# Patient Record
Sex: MALE | Race: WHITE | NOT HISPANIC OR LATINO | Employment: OTHER | URBAN - METROPOLITAN AREA
[De-identification: names, ages, dates, MRNs, and addresses within clinical notes are randomized per-mention and may not be internally consistent; named-entity substitution may affect disease eponyms.]

---

## 2017-01-24 ENCOUNTER — OFFICE VISIT - HEALTHEAST (OUTPATIENT)
Dept: INTERNAL MEDICINE | Facility: CLINIC | Age: 57
End: 2017-01-24

## 2017-01-24 DIAGNOSIS — N18.30 CHRONIC KIDNEY DISEASE, STAGE III (MODERATE) (H): ICD-10-CM

## 2017-01-24 DIAGNOSIS — I10 BENIGN SYSTOLIC HYPERTENSION: ICD-10-CM

## 2017-01-24 DIAGNOSIS — G40.909 EPILEPSY (H): ICD-10-CM

## 2017-01-24 DIAGNOSIS — E78.5 HYPERLIPEMIA: ICD-10-CM

## 2017-01-24 ASSESSMENT — MIFFLIN-ST. JEOR: SCORE: 1786.93

## 2017-04-07 ENCOUNTER — COMMUNICATION - HEALTHEAST (OUTPATIENT)
Dept: INTERNAL MEDICINE | Facility: CLINIC | Age: 57
End: 2017-04-07

## 2017-04-07 DIAGNOSIS — E78.5 HYPERLIPIDEMIA: ICD-10-CM

## 2017-04-07 DIAGNOSIS — I10 HTN (HYPERTENSION): ICD-10-CM

## 2017-07-24 ENCOUNTER — COMMUNICATION - HEALTHEAST (OUTPATIENT)
Dept: INTERNAL MEDICINE | Facility: CLINIC | Age: 57
End: 2017-07-24

## 2017-07-24 ENCOUNTER — OFFICE VISIT - HEALTHEAST (OUTPATIENT)
Dept: INTERNAL MEDICINE | Facility: CLINIC | Age: 57
End: 2017-07-24

## 2017-07-24 DIAGNOSIS — E78.5 HYPERLIPEMIA: ICD-10-CM

## 2017-07-24 DIAGNOSIS — I10 BENIGN ESSENTIAL HTN: ICD-10-CM

## 2017-07-24 DIAGNOSIS — G40.909 EPILEPSY (H): ICD-10-CM

## 2017-07-24 DIAGNOSIS — N18.30 CHRONIC KIDNEY DISEASE, STAGE III (MODERATE) (H): ICD-10-CM

## 2017-07-24 LAB
CHOLEST SERPL-MCNC: 210 MG/DL
FASTING STATUS PATIENT QL REPORTED: YES
HDLC SERPL-MCNC: 34 MG/DL
LDLC SERPL CALC-MCNC: 152 MG/DL
TRIGL SERPL-MCNC: 120 MG/DL

## 2017-08-15 ENCOUNTER — COMMUNICATION - HEALTHEAST (OUTPATIENT)
Dept: INTERNAL MEDICINE | Facility: CLINIC | Age: 57
End: 2017-08-15

## 2017-08-15 DIAGNOSIS — I10 HTN (HYPERTENSION): ICD-10-CM

## 2017-08-15 DIAGNOSIS — E78.5 HYPERLIPIDEMIA: ICD-10-CM

## 2017-10-09 ENCOUNTER — RECORDS - HEALTHEAST (OUTPATIENT)
Dept: ADMINISTRATIVE | Facility: OTHER | Age: 57
End: 2017-10-09

## 2017-10-23 ENCOUNTER — COMMUNICATION - HEALTHEAST (OUTPATIENT)
Dept: INTERNAL MEDICINE | Facility: CLINIC | Age: 57
End: 2017-10-23

## 2017-10-23 DIAGNOSIS — I10 BENIGN ESSENTIAL HTN: ICD-10-CM

## 2018-01-04 ENCOUNTER — COMMUNICATION - HEALTHEAST (OUTPATIENT)
Dept: INTERNAL MEDICINE | Facility: CLINIC | Age: 58
End: 2018-01-04

## 2018-01-04 ENCOUNTER — OFFICE VISIT - HEALTHEAST (OUTPATIENT)
Dept: INTERNAL MEDICINE | Facility: CLINIC | Age: 58
End: 2018-01-04

## 2018-01-04 DIAGNOSIS — I10 BENIGN ESSENTIAL HTN: ICD-10-CM

## 2018-01-04 DIAGNOSIS — Z12.5 SCREENING PSA (PROSTATE SPECIFIC ANTIGEN): ICD-10-CM

## 2018-01-04 DIAGNOSIS — G40.909 EPILEPSY (H): ICD-10-CM

## 2018-01-04 LAB — PSA SERPL-MCNC: 0.8 NG/ML (ref 0–3.5)

## 2018-06-19 ENCOUNTER — OFFICE VISIT - HEALTHEAST (OUTPATIENT)
Dept: INTERNAL MEDICINE | Facility: CLINIC | Age: 58
End: 2018-06-19

## 2018-06-19 ENCOUNTER — AMBULATORY - HEALTHEAST (OUTPATIENT)
Dept: INTERNAL MEDICINE | Facility: CLINIC | Age: 58
End: 2018-06-19

## 2018-06-19 DIAGNOSIS — M79.662 PAIN OF LEFT LOWER LEG: ICD-10-CM

## 2018-06-19 DIAGNOSIS — M71.22 POPLITEAL CYST, LEFT: ICD-10-CM

## 2018-06-19 ASSESSMENT — MIFFLIN-ST. JEOR: SCORE: 1817.77

## 2018-06-21 ENCOUNTER — COMMUNICATION - HEALTHEAST (OUTPATIENT)
Dept: INTERNAL MEDICINE | Facility: CLINIC | Age: 58
End: 2018-06-21

## 2018-06-29 ENCOUNTER — RECORDS - HEALTHEAST (OUTPATIENT)
Dept: ADMINISTRATIVE | Facility: OTHER | Age: 58
End: 2018-06-29

## 2018-07-06 ENCOUNTER — COMMUNICATION - HEALTHEAST (OUTPATIENT)
Dept: INTERNAL MEDICINE | Facility: CLINIC | Age: 58
End: 2018-07-06

## 2018-07-06 DIAGNOSIS — E78.5 HYPERLIPIDEMIA: ICD-10-CM

## 2018-07-11 ENCOUNTER — OFFICE VISIT - HEALTHEAST (OUTPATIENT)
Dept: INTERNAL MEDICINE | Facility: CLINIC | Age: 58
End: 2018-07-11

## 2018-07-11 DIAGNOSIS — I10 BENIGN ESSENTIAL HYPERTENSION: ICD-10-CM

## 2018-07-11 DIAGNOSIS — H02.9 LESION OF LOWER EYELID: ICD-10-CM

## 2018-07-20 ENCOUNTER — RECORDS - HEALTHEAST (OUTPATIENT)
Dept: ADMINISTRATIVE | Facility: OTHER | Age: 58
End: 2018-07-20

## 2018-07-24 ENCOUNTER — RECORDS - HEALTHEAST (OUTPATIENT)
Dept: ADMINISTRATIVE | Facility: OTHER | Age: 58
End: 2018-07-24

## 2018-08-09 ENCOUNTER — COMMUNICATION - HEALTHEAST (OUTPATIENT)
Dept: INTERNAL MEDICINE | Facility: CLINIC | Age: 58
End: 2018-08-09

## 2018-08-09 DIAGNOSIS — I10 BENIGN ESSENTIAL HTN: ICD-10-CM

## 2018-08-28 ENCOUNTER — RECORDS - HEALTHEAST (OUTPATIENT)
Dept: ADMINISTRATIVE | Facility: OTHER | Age: 58
End: 2018-08-28

## 2018-08-30 ENCOUNTER — RECORDS - HEALTHEAST (OUTPATIENT)
Dept: ADMINISTRATIVE | Facility: OTHER | Age: 58
End: 2018-08-30

## 2018-08-31 ENCOUNTER — RECORDS - HEALTHEAST (OUTPATIENT)
Dept: ADMINISTRATIVE | Facility: OTHER | Age: 58
End: 2018-08-31

## 2018-09-03 ENCOUNTER — RECORDS - HEALTHEAST (OUTPATIENT)
Dept: ADMINISTRATIVE | Facility: OTHER | Age: 58
End: 2018-09-03

## 2018-09-06 ENCOUNTER — COMMUNICATION - HEALTHEAST (OUTPATIENT)
Dept: INTERNAL MEDICINE | Facility: CLINIC | Age: 58
End: 2018-09-06

## 2018-09-07 ENCOUNTER — RECORDS - HEALTHEAST (OUTPATIENT)
Dept: ADMINISTRATIVE | Facility: OTHER | Age: 58
End: 2018-09-07

## 2018-09-10 ENCOUNTER — OFFICE VISIT - HEALTHEAST (OUTPATIENT)
Dept: INTERNAL MEDICINE | Facility: CLINIC | Age: 58
End: 2018-09-10

## 2018-09-10 ENCOUNTER — COMMUNICATION - HEALTHEAST (OUTPATIENT)
Dept: ANTICOAGULATION | Facility: CLINIC | Age: 58
End: 2018-09-10

## 2018-09-10 DIAGNOSIS — I50.22 CHRONIC SYSTOLIC CONGESTIVE HEART FAILURE (H): ICD-10-CM

## 2018-09-10 DIAGNOSIS — G40.909 EPILEPSY (H): ICD-10-CM

## 2018-09-10 DIAGNOSIS — N18.30 CHRONIC KIDNEY DISEASE, STAGE III (MODERATE) (H): ICD-10-CM

## 2018-09-10 DIAGNOSIS — S06.9X0D TRAUMATIC BRAIN INJURY, WITHOUT LOSS OF CONSCIOUSNESS, SUBSEQUENT ENCOUNTER: ICD-10-CM

## 2018-09-10 DIAGNOSIS — I51.3 APICAL MURAL THROMBUS: ICD-10-CM

## 2018-09-10 DIAGNOSIS — I25.10 CAD (CORONARY ARTERY DISEASE): ICD-10-CM

## 2018-09-10 DIAGNOSIS — E78.2 MIXED HYPERLIPIDEMIA: ICD-10-CM

## 2018-09-10 LAB
ANION GAP SERPL CALCULATED.3IONS-SCNC: 11 MMOL/L (ref 5–18)
BUN SERPL-MCNC: 31 MG/DL (ref 8–22)
CALCIUM SERPL-MCNC: 9.3 MG/DL (ref 8.5–10.5)
CHLORIDE BLD-SCNC: 107 MMOL/L (ref 98–107)
CO2 SERPL-SCNC: 22 MMOL/L (ref 22–31)
CREAT SERPL-MCNC: 1.9 MG/DL (ref 0.7–1.3)
GFR SERPL CREATININE-BSD FRML MDRD: 36 ML/MIN/1.73M2
GLUCOSE BLD-MCNC: 93 MG/DL (ref 70–125)
HGB BLD-MCNC: 13.6 G/DL (ref 14–18)
INR PPP: 2.1 (ref 0.9–1.1)
POTASSIUM BLD-SCNC: 4.7 MMOL/L (ref 3.5–5)
SODIUM SERPL-SCNC: 140 MMOL/L (ref 136–145)

## 2018-09-14 ENCOUNTER — AMBULATORY - HEALTHEAST (OUTPATIENT)
Dept: LAB | Facility: CLINIC | Age: 58
End: 2018-09-14

## 2018-09-14 ENCOUNTER — COMMUNICATION - HEALTHEAST (OUTPATIENT)
Dept: ANTICOAGULATION | Facility: CLINIC | Age: 58
End: 2018-09-14

## 2018-09-14 DIAGNOSIS — I51.3 APICAL MURAL THROMBUS: ICD-10-CM

## 2018-09-14 DIAGNOSIS — I25.10 CAD (CORONARY ARTERY DISEASE): ICD-10-CM

## 2018-09-14 LAB — INR PPP: 3.9 (ref 0.9–1.1)

## 2018-09-18 ENCOUNTER — COMMUNICATION - HEALTHEAST (OUTPATIENT)
Dept: ANTICOAGULATION | Facility: CLINIC | Age: 58
End: 2018-09-18

## 2018-09-18 ENCOUNTER — AMBULATORY - HEALTHEAST (OUTPATIENT)
Dept: LAB | Facility: CLINIC | Age: 58
End: 2018-09-18

## 2018-09-18 DIAGNOSIS — I51.3 APICAL MURAL THROMBUS: ICD-10-CM

## 2018-09-18 DIAGNOSIS — I25.10 CAD (CORONARY ARTERY DISEASE): ICD-10-CM

## 2018-09-18 LAB — INR PPP: 2.7 (ref 0.9–1.1)

## 2018-09-21 ENCOUNTER — AMBULATORY - HEALTHEAST (OUTPATIENT)
Dept: LAB | Facility: CLINIC | Age: 58
End: 2018-09-21

## 2018-09-21 ENCOUNTER — COMMUNICATION - HEALTHEAST (OUTPATIENT)
Dept: ANTICOAGULATION | Facility: CLINIC | Age: 58
End: 2018-09-21

## 2018-09-21 DIAGNOSIS — I25.10 CAD (CORONARY ARTERY DISEASE): ICD-10-CM

## 2018-09-21 DIAGNOSIS — I51.3 APICAL MURAL THROMBUS: ICD-10-CM

## 2018-09-21 LAB — INR PPP: 3.8 (ref 0.9–1.1)

## 2018-09-24 ENCOUNTER — COMMUNICATION - HEALTHEAST (OUTPATIENT)
Dept: ANTICOAGULATION | Facility: CLINIC | Age: 58
End: 2018-09-24

## 2018-09-24 ENCOUNTER — OFFICE VISIT - HEALTHEAST (OUTPATIENT)
Dept: INTERNAL MEDICINE | Facility: CLINIC | Age: 58
End: 2018-09-24

## 2018-09-24 ENCOUNTER — AMBULATORY - HEALTHEAST (OUTPATIENT)
Dept: LAB | Facility: CLINIC | Age: 58
End: 2018-09-24

## 2018-09-24 DIAGNOSIS — I51.3 APICAL MURAL THROMBUS: ICD-10-CM

## 2018-09-24 DIAGNOSIS — Z95.1 S/P CABG (CORONARY ARTERY BYPASS GRAFT): ICD-10-CM

## 2018-09-24 DIAGNOSIS — I25.10 CAD, MULTIPLE VESSEL: ICD-10-CM

## 2018-09-24 DIAGNOSIS — Z79.01 WARFARIN ANTICOAGULATION: ICD-10-CM

## 2018-09-24 DIAGNOSIS — I25.10 CAD (CORONARY ARTERY DISEASE): ICD-10-CM

## 2018-09-24 DIAGNOSIS — G40.909 EPILEPSY (H): ICD-10-CM

## 2018-09-24 DIAGNOSIS — E78.5 HYPERLIPEMIA: ICD-10-CM

## 2018-09-24 LAB — INR PPP: 3.3 (ref 0.9–1.1)

## 2018-10-01 ENCOUNTER — AMBULATORY - HEALTHEAST (OUTPATIENT)
Dept: LAB | Facility: CLINIC | Age: 58
End: 2018-10-01

## 2018-10-01 ENCOUNTER — COMMUNICATION - HEALTHEAST (OUTPATIENT)
Dept: ANTICOAGULATION | Facility: CLINIC | Age: 58
End: 2018-10-01

## 2018-10-01 DIAGNOSIS — I25.10 CAD, MULTIPLE VESSEL: ICD-10-CM

## 2018-10-01 DIAGNOSIS — I51.3 APICAL MURAL THROMBUS: ICD-10-CM

## 2018-10-01 DIAGNOSIS — I25.10 CAD (CORONARY ARTERY DISEASE): ICD-10-CM

## 2018-10-01 LAB — INR PPP: 3.9 (ref 0.9–1.1)

## 2018-10-08 ENCOUNTER — COMMUNICATION - HEALTHEAST (OUTPATIENT)
Dept: ANTICOAGULATION | Facility: CLINIC | Age: 58
End: 2018-10-08

## 2018-10-08 ENCOUNTER — AMBULATORY - HEALTHEAST (OUTPATIENT)
Dept: LAB | Facility: CLINIC | Age: 58
End: 2018-10-08

## 2018-10-08 DIAGNOSIS — I25.10 CAD, MULTIPLE VESSEL: ICD-10-CM

## 2018-10-08 DIAGNOSIS — I51.3 APICAL MURAL THROMBUS: ICD-10-CM

## 2018-10-08 DIAGNOSIS — I25.10 CAD (CORONARY ARTERY DISEASE): ICD-10-CM

## 2018-10-08 LAB — INR PPP: 1.9 (ref 0.9–1.1)

## 2018-10-11 ENCOUNTER — COMMUNICATION - HEALTHEAST (OUTPATIENT)
Dept: SCHEDULING | Facility: CLINIC | Age: 58
End: 2018-10-11

## 2018-10-11 ENCOUNTER — COMMUNICATION - HEALTHEAST (OUTPATIENT)
Dept: ANTICOAGULATION | Facility: CLINIC | Age: 58
End: 2018-10-11

## 2018-10-11 DIAGNOSIS — I25.10 CAD, MULTIPLE VESSEL: ICD-10-CM

## 2018-10-11 DIAGNOSIS — I51.3 APICAL MURAL THROMBUS: ICD-10-CM

## 2018-10-11 DIAGNOSIS — J20.9 ACUTE BRONCHITIS: ICD-10-CM

## 2018-10-15 ENCOUNTER — AMBULATORY - HEALTHEAST (OUTPATIENT)
Dept: LAB | Facility: CLINIC | Age: 58
End: 2018-10-15

## 2018-10-15 ENCOUNTER — COMMUNICATION - HEALTHEAST (OUTPATIENT)
Dept: ANTICOAGULATION | Facility: CLINIC | Age: 58
End: 2018-10-15

## 2018-10-15 DIAGNOSIS — I51.3 APICAL MURAL THROMBUS: ICD-10-CM

## 2018-10-15 DIAGNOSIS — I25.10 CAD, MULTIPLE VESSEL: ICD-10-CM

## 2018-10-15 DIAGNOSIS — I25.10 CAD (CORONARY ARTERY DISEASE): ICD-10-CM

## 2018-10-15 LAB — INR PPP: 2 (ref 0.9–1.1)

## 2018-10-22 ENCOUNTER — COMMUNICATION - HEALTHEAST (OUTPATIENT)
Dept: ANTICOAGULATION | Facility: CLINIC | Age: 58
End: 2018-10-22

## 2018-10-22 ENCOUNTER — OFFICE VISIT - HEALTHEAST (OUTPATIENT)
Dept: INTERNAL MEDICINE | Facility: CLINIC | Age: 58
End: 2018-10-22

## 2018-10-22 DIAGNOSIS — Z95.1 S/P CABG (CORONARY ARTERY BYPASS GRAFT): ICD-10-CM

## 2018-10-22 DIAGNOSIS — G40.909 EPILEPSY (H): ICD-10-CM

## 2018-10-22 DIAGNOSIS — I10 BENIGN ESSENTIAL HTN: ICD-10-CM

## 2018-10-22 DIAGNOSIS — I51.3 APICAL MURAL THROMBUS: ICD-10-CM

## 2018-10-22 DIAGNOSIS — N18.30 CHRONIC KIDNEY DISEASE, STAGE III (MODERATE) (H): ICD-10-CM

## 2018-10-22 DIAGNOSIS — I25.10 CAD, MULTIPLE VESSEL: ICD-10-CM

## 2018-10-22 DIAGNOSIS — Z79.01 WARFARIN ANTICOAGULATION: ICD-10-CM

## 2018-10-22 LAB
ANION GAP SERPL CALCULATED.3IONS-SCNC: 10 MMOL/L (ref 5–18)
BUN SERPL-MCNC: 22 MG/DL (ref 8–22)
CALCIUM SERPL-MCNC: 9.9 MG/DL (ref 8.5–10.5)
CHLORIDE BLD-SCNC: 107 MMOL/L (ref 98–107)
CO2 SERPL-SCNC: 23 MMOL/L (ref 22–31)
CREAT SERPL-MCNC: 1.82 MG/DL (ref 0.7–1.3)
GFR SERPL CREATININE-BSD FRML MDRD: 38 ML/MIN/1.73M2
GLUCOSE BLD-MCNC: 94 MG/DL (ref 70–125)
INR PPP: 2.2 (ref 0.9–1.1)
POTASSIUM BLD-SCNC: 4.6 MMOL/L (ref 3.5–5)
SODIUM SERPL-SCNC: 140 MMOL/L (ref 136–145)

## 2018-10-23 ENCOUNTER — COMMUNICATION - HEALTHEAST (OUTPATIENT)
Dept: INTERNAL MEDICINE | Facility: CLINIC | Age: 58
End: 2018-10-23

## 2018-11-02 ENCOUNTER — COMMUNICATION - HEALTHEAST (OUTPATIENT)
Dept: ANTICOAGULATION | Facility: CLINIC | Age: 58
End: 2018-11-02

## 2018-11-02 ENCOUNTER — AMBULATORY - HEALTHEAST (OUTPATIENT)
Dept: LAB | Facility: CLINIC | Age: 58
End: 2018-11-02

## 2018-11-02 DIAGNOSIS — I25.10 CAD, MULTIPLE VESSEL: ICD-10-CM

## 2018-11-02 DIAGNOSIS — I51.3 APICAL MURAL THROMBUS: ICD-10-CM

## 2018-11-02 DIAGNOSIS — I25.10 CAD (CORONARY ARTERY DISEASE): ICD-10-CM

## 2018-11-02 LAB — INR PPP: 2.1 (ref 0.9–1.1)

## 2018-11-28 ENCOUNTER — COMMUNICATION - HEALTHEAST (OUTPATIENT)
Dept: INTERNAL MEDICINE | Facility: CLINIC | Age: 58
End: 2018-11-28

## 2018-11-28 DIAGNOSIS — Z79.01 WARFARIN ANTICOAGULATION: ICD-10-CM

## 2018-12-03 ENCOUNTER — COMMUNICATION - HEALTHEAST (OUTPATIENT)
Dept: ANTICOAGULATION | Facility: CLINIC | Age: 58
End: 2018-12-03

## 2018-12-03 ENCOUNTER — AMBULATORY - HEALTHEAST (OUTPATIENT)
Dept: LAB | Facility: CLINIC | Age: 58
End: 2018-12-03

## 2018-12-03 DIAGNOSIS — I51.3 APICAL MURAL THROMBUS: ICD-10-CM

## 2018-12-03 DIAGNOSIS — I25.10 CAD (CORONARY ARTERY DISEASE): ICD-10-CM

## 2018-12-03 DIAGNOSIS — I25.10 CAD, MULTIPLE VESSEL: ICD-10-CM

## 2018-12-03 LAB — INR PPP: 1.8 (ref 0.9–1.1)

## 2018-12-18 ENCOUNTER — COMMUNICATION - HEALTHEAST (OUTPATIENT)
Dept: ANTICOAGULATION | Facility: CLINIC | Age: 58
End: 2018-12-18

## 2018-12-18 ENCOUNTER — AMBULATORY - HEALTHEAST (OUTPATIENT)
Dept: LAB | Facility: CLINIC | Age: 58
End: 2018-12-18

## 2018-12-18 DIAGNOSIS — I51.3 APICAL MURAL THROMBUS: ICD-10-CM

## 2018-12-18 DIAGNOSIS — I25.10 CAD, MULTIPLE VESSEL: ICD-10-CM

## 2018-12-18 DIAGNOSIS — I25.10 CAD (CORONARY ARTERY DISEASE): ICD-10-CM

## 2018-12-18 LAB — INR PPP: 1.6 (ref 0.9–1.1)

## 2018-12-31 ENCOUNTER — AMBULATORY - HEALTHEAST (OUTPATIENT)
Dept: LAB | Facility: CLINIC | Age: 58
End: 2018-12-31

## 2018-12-31 ENCOUNTER — COMMUNICATION - HEALTHEAST (OUTPATIENT)
Dept: ANTICOAGULATION | Facility: CLINIC | Age: 58
End: 2018-12-31

## 2018-12-31 DIAGNOSIS — I25.10 CAD, MULTIPLE VESSEL: ICD-10-CM

## 2018-12-31 DIAGNOSIS — I25.10 CAD (CORONARY ARTERY DISEASE): ICD-10-CM

## 2018-12-31 DIAGNOSIS — I51.3 APICAL MURAL THROMBUS: ICD-10-CM

## 2018-12-31 LAB — INR PPP: 1.8 (ref 0.9–1.1)

## 2019-01-08 ENCOUNTER — RECORDS - HEALTHEAST (OUTPATIENT)
Dept: ADMINISTRATIVE | Facility: OTHER | Age: 59
End: 2019-01-08

## 2019-01-09 ENCOUNTER — RECORDS - HEALTHEAST (OUTPATIENT)
Dept: ADMINISTRATIVE | Facility: OTHER | Age: 59
End: 2019-01-09

## 2019-01-14 ENCOUNTER — AMBULATORY - HEALTHEAST (OUTPATIENT)
Dept: LAB | Facility: CLINIC | Age: 59
End: 2019-01-14

## 2019-01-14 ENCOUNTER — COMMUNICATION - HEALTHEAST (OUTPATIENT)
Dept: ANTICOAGULATION | Facility: CLINIC | Age: 59
End: 2019-01-14

## 2019-01-14 DIAGNOSIS — I25.10 CAD, MULTIPLE VESSEL: ICD-10-CM

## 2019-01-14 DIAGNOSIS — I51.3 APICAL MURAL THROMBUS: ICD-10-CM

## 2019-01-14 DIAGNOSIS — I25.10 CAD (CORONARY ARTERY DISEASE): ICD-10-CM

## 2019-01-14 LAB — INR PPP: 2.9 (ref 0.9–1.1)

## 2019-01-28 ENCOUNTER — COMMUNICATION - HEALTHEAST (OUTPATIENT)
Dept: ANTICOAGULATION | Facility: CLINIC | Age: 59
End: 2019-01-28

## 2019-01-28 ENCOUNTER — AMBULATORY - HEALTHEAST (OUTPATIENT)
Dept: LAB | Facility: CLINIC | Age: 59
End: 2019-01-28

## 2019-01-28 DIAGNOSIS — I25.10 CAD (CORONARY ARTERY DISEASE): ICD-10-CM

## 2019-01-28 DIAGNOSIS — I51.3 APICAL MURAL THROMBUS: ICD-10-CM

## 2019-01-28 DIAGNOSIS — I25.10 CAD, MULTIPLE VESSEL: ICD-10-CM

## 2019-01-28 LAB — INR PPP: 2.6 (ref 0.9–1.1)

## 2019-02-25 ENCOUNTER — COMMUNICATION - HEALTHEAST (OUTPATIENT)
Dept: ANTICOAGULATION | Facility: CLINIC | Age: 59
End: 2019-02-25

## 2019-02-25 ENCOUNTER — AMBULATORY - HEALTHEAST (OUTPATIENT)
Dept: LAB | Facility: CLINIC | Age: 59
End: 2019-02-25

## 2019-02-25 DIAGNOSIS — I25.10 CAD (CORONARY ARTERY DISEASE): ICD-10-CM

## 2019-02-25 DIAGNOSIS — I51.3 APICAL MURAL THROMBUS: ICD-10-CM

## 2019-02-25 DIAGNOSIS — I25.10 CAD, MULTIPLE VESSEL: ICD-10-CM

## 2019-02-25 LAB — INR PPP: 2.1 (ref 0.9–1.1)

## 2019-02-27 ENCOUNTER — RECORDS - HEALTHEAST (OUTPATIENT)
Dept: ADMINISTRATIVE | Facility: OTHER | Age: 59
End: 2019-02-27

## 2019-03-01 ENCOUNTER — OFFICE VISIT - HEALTHEAST (OUTPATIENT)
Dept: INTERNAL MEDICINE | Facility: CLINIC | Age: 59
End: 2019-03-01

## 2019-03-01 ENCOUNTER — COMMUNICATION - HEALTHEAST (OUTPATIENT)
Dept: INTERNAL MEDICINE | Facility: CLINIC | Age: 59
End: 2019-03-01

## 2019-03-01 DIAGNOSIS — E78.5 HYPERLIPIDEMIA: ICD-10-CM

## 2019-03-01 DIAGNOSIS — G40.909 NONINTRACTABLE EPILEPSY WITHOUT STATUS EPILEPTICUS, UNSPECIFIED EPILEPSY TYPE (H): ICD-10-CM

## 2019-03-01 DIAGNOSIS — Z79.01 WARFARIN ANTICOAGULATION: ICD-10-CM

## 2019-03-01 DIAGNOSIS — Z95.1 S/P CABG (CORONARY ARTERY BYPASS GRAFT): ICD-10-CM

## 2019-03-01 DIAGNOSIS — I10 HTN (HYPERTENSION): ICD-10-CM

## 2019-03-01 LAB
ALBUMIN SERPL-MCNC: 3.9 G/DL (ref 3.5–5)
ALP SERPL-CCNC: 103 U/L (ref 45–120)
ALT SERPL W P-5'-P-CCNC: 41 U/L (ref 0–45)
ANION GAP SERPL CALCULATED.3IONS-SCNC: 6 MMOL/L (ref 5–18)
AST SERPL W P-5'-P-CCNC: 32 U/L (ref 0–40)
BILIRUB SERPL-MCNC: 0.5 MG/DL (ref 0–1)
BUN SERPL-MCNC: 26 MG/DL (ref 8–22)
CALCIUM SERPL-MCNC: 9.8 MG/DL (ref 8.5–10.5)
CHLORIDE BLD-SCNC: 105 MMOL/L (ref 98–107)
CO2 SERPL-SCNC: 28 MMOL/L (ref 22–31)
CREAT SERPL-MCNC: 1.8 MG/DL (ref 0.7–1.3)
ERYTHROCYTE [DISTWIDTH] IN BLOOD BY AUTOMATED COUNT: 12 % (ref 11–14.5)
GFR SERPL CREATININE-BSD FRML MDRD: 39 ML/MIN/1.73M2
GLUCOSE BLD-MCNC: 55 MG/DL (ref 70–125)
HCT VFR BLD AUTO: 49.2 % (ref 40–54)
HGB BLD-MCNC: 16.5 G/DL (ref 14–18)
LDLC SERPL CALC-MCNC: 101 MG/DL
MCH RBC QN AUTO: 32.1 PG (ref 27–34)
MCHC RBC AUTO-ENTMCNC: 33.6 G/DL (ref 32–36)
MCV RBC AUTO: 96 FL (ref 80–100)
PLATELET # BLD AUTO: 198 THOU/UL (ref 140–440)
PMV BLD AUTO: 7 FL (ref 7–10)
POTASSIUM BLD-SCNC: 4.6 MMOL/L (ref 3.5–5)
PROT SERPL-MCNC: 6.6 G/DL (ref 6–8)
RBC # BLD AUTO: 5.15 MILL/UL (ref 4.4–6.2)
SODIUM SERPL-SCNC: 139 MMOL/L (ref 136–145)
WBC: 5.6 THOU/UL (ref 4–11)

## 2019-03-08 ENCOUNTER — COMMUNICATION - HEALTHEAST (OUTPATIENT)
Dept: INTERNAL MEDICINE | Facility: CLINIC | Age: 59
End: 2019-03-08

## 2019-03-25 ENCOUNTER — AMBULATORY - HEALTHEAST (OUTPATIENT)
Dept: LAB | Facility: CLINIC | Age: 59
End: 2019-03-25

## 2019-03-25 ENCOUNTER — COMMUNICATION - HEALTHEAST (OUTPATIENT)
Dept: ANTICOAGULATION | Facility: CLINIC | Age: 59
End: 2019-03-25

## 2019-03-25 DIAGNOSIS — I51.3 APICAL MURAL THROMBUS: ICD-10-CM

## 2019-03-25 DIAGNOSIS — I25.10 CAD (CORONARY ARTERY DISEASE): ICD-10-CM

## 2019-03-25 DIAGNOSIS — I25.10 CAD, MULTIPLE VESSEL: ICD-10-CM

## 2019-03-25 LAB — INR PPP: 3.4 (ref 0.9–1.1)

## 2019-04-02 ENCOUNTER — COMMUNICATION - HEALTHEAST (OUTPATIENT)
Dept: INTERNAL MEDICINE | Facility: CLINIC | Age: 59
End: 2019-04-02

## 2019-04-02 DIAGNOSIS — E78.5 HYPERLIPEMIA: ICD-10-CM

## 2019-04-08 ENCOUNTER — COMMUNICATION - HEALTHEAST (OUTPATIENT)
Dept: ANTICOAGULATION | Facility: CLINIC | Age: 59
End: 2019-04-08

## 2019-04-08 ENCOUNTER — AMBULATORY - HEALTHEAST (OUTPATIENT)
Dept: LAB | Facility: CLINIC | Age: 59
End: 2019-04-08

## 2019-04-08 ENCOUNTER — RECORDS - HEALTHEAST (OUTPATIENT)
Dept: ANTICOAGULATION | Facility: CLINIC | Age: 59
End: 2019-04-08

## 2019-04-08 DIAGNOSIS — I51.3 APICAL MURAL THROMBUS: ICD-10-CM

## 2019-04-08 DIAGNOSIS — I25.10 CAD, MULTIPLE VESSEL: ICD-10-CM

## 2019-04-08 DIAGNOSIS — I25.10 CAD (CORONARY ARTERY DISEASE): ICD-10-CM

## 2019-04-08 LAB — INR PPP: 4.2 (ref 0.9–1.1)

## 2019-04-15 ENCOUNTER — COMMUNICATION - HEALTHEAST (OUTPATIENT)
Dept: INTERNAL MEDICINE | Facility: CLINIC | Age: 59
End: 2019-04-15

## 2019-04-15 DIAGNOSIS — Z79.01 WARFARIN ANTICOAGULATION: ICD-10-CM

## 2019-04-22 ENCOUNTER — AMBULATORY - HEALTHEAST (OUTPATIENT)
Dept: LAB | Facility: CLINIC | Age: 59
End: 2019-04-22

## 2019-04-22 ENCOUNTER — COMMUNICATION - HEALTHEAST (OUTPATIENT)
Dept: ANTICOAGULATION | Facility: CLINIC | Age: 59
End: 2019-04-22

## 2019-04-22 DIAGNOSIS — I25.10 CAD, MULTIPLE VESSEL: ICD-10-CM

## 2019-04-22 DIAGNOSIS — I51.3 APICAL MURAL THROMBUS: ICD-10-CM

## 2019-04-22 DIAGNOSIS — I25.10 CAD (CORONARY ARTERY DISEASE): ICD-10-CM

## 2019-04-22 LAB — INR PPP: 2.4 (ref 0.9–1.1)

## 2019-04-30 ENCOUNTER — COMMUNICATION - HEALTHEAST (OUTPATIENT)
Dept: INTERNAL MEDICINE | Facility: CLINIC | Age: 59
End: 2019-04-30

## 2019-04-30 DIAGNOSIS — E78.5 HYPERLIPEMIA: ICD-10-CM

## 2019-05-06 ENCOUNTER — COMMUNICATION - HEALTHEAST (OUTPATIENT)
Dept: ANTICOAGULATION | Facility: CLINIC | Age: 59
End: 2019-05-06

## 2019-05-06 ENCOUNTER — AMBULATORY - HEALTHEAST (OUTPATIENT)
Dept: LAB | Facility: CLINIC | Age: 59
End: 2019-05-06

## 2019-05-06 ENCOUNTER — COMMUNICATION - HEALTHEAST (OUTPATIENT)
Dept: INTERNAL MEDICINE | Facility: CLINIC | Age: 59
End: 2019-05-06

## 2019-05-06 DIAGNOSIS — I25.10 CAD, MULTIPLE VESSEL: ICD-10-CM

## 2019-05-06 DIAGNOSIS — I51.3 APICAL MURAL THROMBUS: ICD-10-CM

## 2019-05-06 DIAGNOSIS — I25.10 CAD (CORONARY ARTERY DISEASE): ICD-10-CM

## 2019-05-06 DIAGNOSIS — E78.5 HYPERLIPEMIA: ICD-10-CM

## 2019-05-06 LAB — INR PPP: 2.4 (ref 0.9–1.1)

## 2019-05-08 ENCOUNTER — OFFICE VISIT - HEALTHEAST (OUTPATIENT)
Dept: INTERNAL MEDICINE | Facility: CLINIC | Age: 59
End: 2019-05-08

## 2019-05-08 DIAGNOSIS — Z79.01 WARFARIN ANTICOAGULATION: ICD-10-CM

## 2019-05-08 DIAGNOSIS — E78.5 HYPERLIPEMIA: ICD-10-CM

## 2019-05-08 DIAGNOSIS — I25.10 CAD, MULTIPLE VESSEL: ICD-10-CM

## 2019-05-08 ASSESSMENT — MIFFLIN-ST. JEOR: SCORE: 1797.36

## 2019-05-30 ENCOUNTER — COMMUNICATION - HEALTHEAST (OUTPATIENT)
Dept: ANTICOAGULATION | Facility: CLINIC | Age: 59
End: 2019-05-30

## 2019-05-30 DIAGNOSIS — I51.3 APICAL MURAL THROMBUS: ICD-10-CM

## 2019-05-30 DIAGNOSIS — I25.10 CAD, MULTIPLE VESSEL: ICD-10-CM

## 2019-06-04 ENCOUNTER — COMMUNICATION - HEALTHEAST (OUTPATIENT)
Dept: ANTICOAGULATION | Facility: CLINIC | Age: 59
End: 2019-06-04

## 2019-06-04 ENCOUNTER — OFFICE VISIT - HEALTHEAST (OUTPATIENT)
Dept: INTERNAL MEDICINE | Facility: CLINIC | Age: 59
End: 2019-06-04

## 2019-06-04 DIAGNOSIS — Z79.01 WARFARIN ANTICOAGULATION: ICD-10-CM

## 2019-06-04 DIAGNOSIS — E78.5 HYPERLIPEMIA: ICD-10-CM

## 2019-06-04 DIAGNOSIS — N18.30 CHRONIC KIDNEY DISEASE, STAGE III (MODERATE) (H): ICD-10-CM

## 2019-06-04 DIAGNOSIS — I51.3 APICAL MURAL THROMBUS: ICD-10-CM

## 2019-06-04 DIAGNOSIS — Z12.5 SCREENING FOR PROSTATE CANCER: ICD-10-CM

## 2019-06-04 DIAGNOSIS — I25.10 CAD, MULTIPLE VESSEL: ICD-10-CM

## 2019-06-04 DIAGNOSIS — I10 HTN (HYPERTENSION): ICD-10-CM

## 2019-06-04 DIAGNOSIS — I10 BENIGN ESSENTIAL HTN: ICD-10-CM

## 2019-06-04 LAB
ALBUMIN SERPL-MCNC: 4.1 G/DL (ref 3.5–5)
ALP SERPL-CCNC: 90 U/L (ref 45–120)
ALT SERPL W P-5'-P-CCNC: 37 U/L (ref 0–45)
ANION GAP SERPL CALCULATED.3IONS-SCNC: 8 MMOL/L (ref 5–18)
AST SERPL W P-5'-P-CCNC: 30 U/L (ref 0–40)
BASOPHILS # BLD AUTO: 0 THOU/UL (ref 0–0.2)
BASOPHILS NFR BLD AUTO: 1 % (ref 0–2)
BILIRUB SERPL-MCNC: 0.4 MG/DL (ref 0–1)
BUN SERPL-MCNC: 35 MG/DL (ref 8–22)
CALCIUM SERPL-MCNC: 10.3 MG/DL (ref 8.5–10.5)
CHLORIDE BLD-SCNC: 111 MMOL/L (ref 98–107)
CO2 SERPL-SCNC: 22 MMOL/L (ref 22–31)
CREAT SERPL-MCNC: 1.83 MG/DL (ref 0.7–1.3)
EOSINOPHIL # BLD AUTO: 0.1 THOU/UL (ref 0–0.4)
EOSINOPHIL NFR BLD AUTO: 2 % (ref 0–6)
ERYTHROCYTE [DISTWIDTH] IN BLOOD BY AUTOMATED COUNT: 11.1 % (ref 11–14.5)
GFR SERPL CREATININE-BSD FRML MDRD: 38 ML/MIN/1.73M2
GLUCOSE BLD-MCNC: 74 MG/DL (ref 70–125)
HCT VFR BLD AUTO: 51.8 % (ref 40–54)
HGB BLD-MCNC: 17.6 G/DL (ref 14–18)
INR PPP: 1.9 (ref 0.9–1.1)
LDLC SERPL CALC-MCNC: 164 MG/DL
LYMPHOCYTES # BLD AUTO: 1.7 THOU/UL (ref 0.8–4.4)
LYMPHOCYTES NFR BLD AUTO: 36 % (ref 20–40)
MCH RBC QN AUTO: 32.8 PG (ref 27–34)
MCHC RBC AUTO-ENTMCNC: 33.9 G/DL (ref 32–36)
MCV RBC AUTO: 97 FL (ref 80–100)
MONOCYTES # BLD AUTO: 0.4 THOU/UL (ref 0–0.9)
MONOCYTES NFR BLD AUTO: 9 % (ref 2–10)
NEUTROPHILS # BLD AUTO: 2.5 THOU/UL (ref 2–7.7)
NEUTROPHILS NFR BLD AUTO: 53 % (ref 50–70)
PLATELET # BLD AUTO: 174 THOU/UL (ref 140–440)
PMV BLD AUTO: 6.8 FL (ref 7–10)
POTASSIUM BLD-SCNC: 4.8 MMOL/L (ref 3.5–5)
PROT SERPL-MCNC: 6.8 G/DL (ref 6–8)
PSA SERPL-MCNC: 0.8 NG/ML (ref 0–4.5)
RBC # BLD AUTO: 5.36 MILL/UL (ref 4.4–6.2)
SODIUM SERPL-SCNC: 141 MMOL/L (ref 136–145)
WBC: 4.7 THOU/UL (ref 4–11)

## 2019-06-04 ASSESSMENT — MIFFLIN-ST. JEOR: SCORE: 1776.95

## 2019-06-10 ENCOUNTER — COMMUNICATION - HEALTHEAST (OUTPATIENT)
Dept: INTERNAL MEDICINE | Facility: CLINIC | Age: 59
End: 2019-06-10

## 2019-06-10 DIAGNOSIS — E78.5 HYPERLIPIDEMIA LDL GOAL <100: ICD-10-CM

## 2019-06-11 ENCOUNTER — COMMUNICATION - HEALTHEAST (OUTPATIENT)
Dept: INTERNAL MEDICINE | Facility: CLINIC | Age: 59
End: 2019-06-11

## 2019-06-17 ENCOUNTER — COMMUNICATION - HEALTHEAST (OUTPATIENT)
Dept: ANTICOAGULATION | Facility: CLINIC | Age: 59
End: 2019-06-17

## 2019-06-17 ENCOUNTER — AMBULATORY - HEALTHEAST (OUTPATIENT)
Dept: LAB | Facility: CLINIC | Age: 59
End: 2019-06-17

## 2019-06-17 DIAGNOSIS — I51.3 APICAL MURAL THROMBUS: ICD-10-CM

## 2019-06-17 DIAGNOSIS — I25.10 CAD, MULTIPLE VESSEL: ICD-10-CM

## 2019-06-17 LAB — INR PPP: 2.9 (ref 0.9–1.1)

## 2019-06-19 ENCOUNTER — RECORDS - HEALTHEAST (OUTPATIENT)
Dept: ADMINISTRATIVE | Facility: OTHER | Age: 59
End: 2019-06-19

## 2019-06-24 ENCOUNTER — COMMUNICATION - HEALTHEAST (OUTPATIENT)
Dept: INTERNAL MEDICINE | Facility: CLINIC | Age: 59
End: 2019-06-24

## 2019-06-24 DIAGNOSIS — Z79.01 WARFARIN ANTICOAGULATION: ICD-10-CM

## 2019-07-01 ENCOUNTER — AMBULATORY - HEALTHEAST (OUTPATIENT)
Dept: LAB | Facility: CLINIC | Age: 59
End: 2019-07-01

## 2019-07-01 ENCOUNTER — COMMUNICATION - HEALTHEAST (OUTPATIENT)
Dept: ANTICOAGULATION | Facility: CLINIC | Age: 59
End: 2019-07-01

## 2019-07-01 ENCOUNTER — OFFICE VISIT - HEALTHEAST (OUTPATIENT)
Dept: INTERNAL MEDICINE | Facility: CLINIC | Age: 59
End: 2019-07-01

## 2019-07-01 DIAGNOSIS — I25.10 CAD, MULTIPLE VESSEL: ICD-10-CM

## 2019-07-01 DIAGNOSIS — L72.3 SEBACEOUS CYST: ICD-10-CM

## 2019-07-01 DIAGNOSIS — I51.3 APICAL MURAL THROMBUS: ICD-10-CM

## 2019-07-01 DIAGNOSIS — E78.5 HYPERLIPIDEMIA LDL GOAL <100: ICD-10-CM

## 2019-07-01 LAB
CHOLEST SERPL-MCNC: 193 MG/DL
FASTING STATUS PATIENT QL REPORTED: YES
HDLC SERPL-MCNC: 36 MG/DL
INR PPP: 3 (ref 0.9–1.1)
LDLC SERPL CALC-MCNC: 136 MG/DL
TRIGL SERPL-MCNC: 103 MG/DL

## 2019-07-01 ASSESSMENT — MIFFLIN-ST. JEOR: SCORE: 1798.27

## 2019-07-12 ENCOUNTER — COMMUNICATION - HEALTHEAST (OUTPATIENT)
Dept: INTERNAL MEDICINE | Facility: CLINIC | Age: 59
End: 2019-07-12

## 2019-07-15 ENCOUNTER — OFFICE VISIT - HEALTHEAST (OUTPATIENT)
Dept: INTERNAL MEDICINE | Facility: CLINIC | Age: 59
End: 2019-07-15

## 2019-07-15 ENCOUNTER — COMMUNICATION - HEALTHEAST (OUTPATIENT)
Dept: SCHEDULING | Facility: CLINIC | Age: 59
End: 2019-07-15

## 2019-07-15 DIAGNOSIS — R51.9 NEW ONSET HEADACHE: ICD-10-CM

## 2019-07-15 LAB
ANION GAP SERPL CALCULATED.3IONS-SCNC: 8 MMOL/L (ref 5–18)
BUN SERPL-MCNC: 25 MG/DL (ref 8–22)
C REACTIVE PROTEIN LHE: 0.2 MG/DL (ref 0–0.8)
CALCIUM SERPL-MCNC: 9.7 MG/DL (ref 8.5–10.5)
CHLORIDE BLD-SCNC: 104 MMOL/L (ref 98–107)
CO2 SERPL-SCNC: 24 MMOL/L (ref 22–31)
CREAT SERPL-MCNC: 1.73 MG/DL (ref 0.7–1.3)
ERYTHROCYTE [DISTWIDTH] IN BLOOD BY AUTOMATED COUNT: 11.9 % (ref 11–14.5)
ERYTHROCYTE [SEDIMENTATION RATE] IN BLOOD BY WESTERGREN METHOD: 5 MM/HR (ref 0–15)
GFR SERPL CREATININE-BSD FRML MDRD: 40 ML/MIN/1.73M2
GLUCOSE BLD-MCNC: 78 MG/DL (ref 70–125)
HCT VFR BLD AUTO: 51.4 % (ref 40–54)
HGB BLD-MCNC: 17.7 G/DL (ref 14–18)
MCH RBC QN AUTO: 32.7 PG (ref 27–34)
MCHC RBC AUTO-ENTMCNC: 34.4 G/DL (ref 32–36)
MCV RBC AUTO: 95 FL (ref 80–100)
PLATELET # BLD AUTO: 179 THOU/UL (ref 140–440)
PMV BLD AUTO: 7.2 FL (ref 7–10)
POTASSIUM BLD-SCNC: 4.5 MMOL/L (ref 3.5–5)
RBC # BLD AUTO: 5.41 MILL/UL (ref 4.4–6.2)
SODIUM SERPL-SCNC: 136 MMOL/L (ref 136–145)
WBC: 5.2 THOU/UL (ref 4–11)

## 2019-07-15 ASSESSMENT — MIFFLIN-ST. JEOR: SCORE: 1804.16

## 2019-07-16 ENCOUNTER — COMMUNICATION - HEALTHEAST (OUTPATIENT)
Dept: INTERNAL MEDICINE | Facility: CLINIC | Age: 59
End: 2019-07-16

## 2019-07-17 ENCOUNTER — COMMUNICATION - HEALTHEAST (OUTPATIENT)
Dept: NEUROLOGY | Facility: CLINIC | Age: 59
End: 2019-07-17

## 2019-07-17 ENCOUNTER — HOSPITAL ENCOUNTER (OUTPATIENT)
Dept: MRI IMAGING | Facility: HOSPITAL | Age: 59
Discharge: HOME | End: 2019-07-17
Attending: INTERNAL MEDICINE

## 2019-07-17 ENCOUNTER — HOSPITAL ENCOUNTER (OUTPATIENT)
Dept: CT IMAGING | Facility: HOSPITAL | Age: 59
Discharge: HOME | End: 2019-07-17
Attending: INTERNAL MEDICINE

## 2019-07-17 ENCOUNTER — COMMUNICATION - HEALTHEAST (OUTPATIENT)
Dept: INTERNAL MEDICINE | Facility: CLINIC | Age: 59
End: 2019-07-17

## 2019-07-17 ENCOUNTER — COMMUNICATION - HEALTHEAST (OUTPATIENT)
Dept: ANTICOAGULATION | Facility: CLINIC | Age: 59
End: 2019-07-17

## 2019-07-17 DIAGNOSIS — R51.9 NEW ONSET HEADACHE: ICD-10-CM

## 2019-07-17 DIAGNOSIS — S06.5XAA SUBDURAL HEMATOMA (H): ICD-10-CM

## 2019-07-17 DIAGNOSIS — I25.10 CAD, MULTIPLE VESSEL: ICD-10-CM

## 2019-07-17 DIAGNOSIS — I51.3 APICAL MURAL THROMBUS: ICD-10-CM

## 2019-07-19 ENCOUNTER — OFFICE VISIT - HEALTHEAST (OUTPATIENT)
Dept: NEUROSURGERY | Facility: CLINIC | Age: 59
End: 2019-07-19

## 2019-07-19 ENCOUNTER — COMMUNICATION - HEALTHEAST (OUTPATIENT)
Dept: NEUROLOGY | Facility: CLINIC | Age: 59
End: 2019-07-19

## 2019-07-19 DIAGNOSIS — I62.00 NONTRAUMATIC SUBDURAL HEMORRHAGE (H): ICD-10-CM

## 2019-07-19 DIAGNOSIS — S06.5XAA SUBDURAL HEMATOMA (H): ICD-10-CM

## 2019-07-19 DIAGNOSIS — I62.03 NONTRAUMATIC CHRONIC SUBDURAL HEMORRHAGE (H): ICD-10-CM

## 2019-07-19 DIAGNOSIS — Z01.818 PREOPERATIVE EXAMINATION: ICD-10-CM

## 2019-07-19 ASSESSMENT — MIFFLIN-ST. JEOR: SCORE: 1804.16

## 2019-07-22 ENCOUNTER — AMBULATORY - HEALTHEAST (OUTPATIENT)
Dept: LAB | Facility: CLINIC | Age: 59
End: 2019-07-22

## 2019-07-22 ENCOUNTER — HOSPITAL ENCOUNTER (OUTPATIENT)
Dept: CT IMAGING | Facility: CLINIC | Age: 59
Discharge: HOME | End: 2019-07-22
Attending: NEUROLOGICAL SURGERY

## 2019-07-22 ENCOUNTER — COMMUNICATION - HEALTHEAST (OUTPATIENT)
Dept: ANTICOAGULATION | Facility: CLINIC | Age: 59
End: 2019-07-22

## 2019-07-22 DIAGNOSIS — S06.5XAA SUBDURAL HEMATOMA (H): ICD-10-CM

## 2019-07-22 DIAGNOSIS — I62.03 NONTRAUMATIC CHRONIC SUBDURAL HEMORRHAGE (H): ICD-10-CM

## 2019-07-22 DIAGNOSIS — I62.00 NONTRAUMATIC SUBDURAL HEMORRHAGE (H): ICD-10-CM

## 2019-07-22 DIAGNOSIS — I51.3 APICAL MURAL THROMBUS: ICD-10-CM

## 2019-07-22 DIAGNOSIS — Z01.818 PREOPERATIVE EXAMINATION: ICD-10-CM

## 2019-07-22 DIAGNOSIS — I25.10 CAD, MULTIPLE VESSEL: ICD-10-CM

## 2019-07-22 LAB
APTT PPP: 24 SECONDS (ref 24–37)
BASOPHILS # BLD AUTO: 0 THOU/UL (ref 0–0.2)
BASOPHILS NFR BLD AUTO: 0 % (ref 0–2)
EOSINOPHIL # BLD AUTO: 0.1 THOU/UL (ref 0–0.4)
EOSINOPHIL NFR BLD AUTO: 1 % (ref 0–6)
ERYTHROCYTE [DISTWIDTH] IN BLOOD BY AUTOMATED COUNT: 12.2 % (ref 11–14.5)
HCT VFR BLD AUTO: 49 % (ref 40–54)
HGB BLD-MCNC: 17 G/DL (ref 14–18)
INR PPP: 1.05 (ref 0.9–1.1)
LYMPHOCYTES # BLD AUTO: 1.4 THOU/UL (ref 0.8–4.4)
LYMPHOCYTES NFR BLD AUTO: 30 % (ref 20–40)
MCH RBC QN AUTO: 32 PG (ref 27–34)
MCHC RBC AUTO-ENTMCNC: 34.7 G/DL (ref 32–36)
MCV RBC AUTO: 92 FL (ref 80–100)
MONOCYTES # BLD AUTO: 0.4 THOU/UL (ref 0–0.9)
MONOCYTES NFR BLD AUTO: 9 % (ref 2–10)
NEUTROPHILS # BLD AUTO: 2.8 THOU/UL (ref 2–7.7)
NEUTROPHILS NFR BLD AUTO: 60 % (ref 50–70)
PLATELET # BLD AUTO: 162 THOU/UL (ref 140–440)
PMV BLD AUTO: 9.1 FL (ref 8.5–12.5)
RBC # BLD AUTO: 5.31 MILL/UL (ref 4.4–6.2)
WBC: 4.7 THOU/UL (ref 4–11)

## 2019-07-29 ENCOUNTER — HOSPITAL ENCOUNTER (OUTPATIENT)
Dept: CT IMAGING | Facility: HOSPITAL | Age: 59
Discharge: HOME | End: 2019-07-29
Attending: NEUROLOGICAL SURGERY

## 2019-07-29 DIAGNOSIS — S06.5XAA SUBDURAL HEMATOMA (H): ICD-10-CM

## 2019-08-01 ENCOUNTER — RECORDS - HEALTHEAST (OUTPATIENT)
Dept: ADMINISTRATIVE | Facility: OTHER | Age: 59
End: 2019-08-01

## 2019-08-01 ENCOUNTER — SURGERY - HEALTHEAST (OUTPATIENT)
Dept: NEUROSURGERY | Facility: CLINIC | Age: 59
End: 2019-08-01

## 2019-08-01 ENCOUNTER — COMMUNICATION - HEALTHEAST (OUTPATIENT)
Dept: NEUROSURGERY | Facility: CLINIC | Age: 59
End: 2019-08-01

## 2019-08-01 ENCOUNTER — ANESTHESIA - HEALTHEAST (OUTPATIENT)
Dept: SURGERY | Facility: CLINIC | Age: 59
End: 2019-08-01

## 2019-08-01 ENCOUNTER — OFFICE VISIT - HEALTHEAST (OUTPATIENT)
Dept: NEUROSURGERY | Facility: CLINIC | Age: 59
End: 2019-08-01

## 2019-08-01 ENCOUNTER — OFFICE VISIT - HEALTHEAST (OUTPATIENT)
Dept: INTERNAL MEDICINE | Facility: CLINIC | Age: 59
End: 2019-08-01

## 2019-08-01 DIAGNOSIS — S06.5XAA SDH (SUBDURAL HEMATOMA) (H): ICD-10-CM

## 2019-08-01 DIAGNOSIS — I51.3 APICAL MURAL THROMBUS: ICD-10-CM

## 2019-08-01 DIAGNOSIS — G93.5 COMPRESSION OF BRAIN (H): ICD-10-CM

## 2019-08-01 DIAGNOSIS — I25.10 CAD, MULTIPLE VESSEL: ICD-10-CM

## 2019-08-01 DIAGNOSIS — S06.5XAA SUBDURAL HEMATOMA (H): ICD-10-CM

## 2019-08-01 DIAGNOSIS — N18.30 CKD (CHRONIC KIDNEY DISEASE) STAGE 3, GFR 30-59 ML/MIN (H): ICD-10-CM

## 2019-08-01 DIAGNOSIS — Z01.818 PREOP EXAM FOR INTERNAL MEDICINE: ICD-10-CM

## 2019-08-01 LAB
ATRIAL RATE - MUSE: 60 BPM
DIASTOLIC BLOOD PRESSURE - MUSE: NORMAL MMHG
INTERPRETATION ECG - MUSE: NORMAL
P AXIS - MUSE: 29 DEGREES
PR INTERVAL - MUSE: 170 MS
QRS DURATION - MUSE: 110 MS
QT - MUSE: 394 MS
QTC - MUSE: 394 MS
R AXIS - MUSE: -43 DEGREES
SYSTOLIC BLOOD PRESSURE - MUSE: NORMAL MMHG
T AXIS - MUSE: 122 DEGREES
VENTRICULAR RATE- MUSE: 60 BPM

## 2019-08-01 ASSESSMENT — MIFFLIN-ST. JEOR
SCORE: 1771.06
SCORE: 1765.61

## 2019-08-02 ENCOUNTER — SURGERY - HEALTHEAST (OUTPATIENT)
Dept: SURGERY | Facility: CLINIC | Age: 59
End: 2019-08-02

## 2019-08-02 ASSESSMENT — MIFFLIN-ST. JEOR
SCORE: 1788.29
SCORE: 1736.58
SCORE: 1788.29
SCORE: 1788.29

## 2019-08-03 ASSESSMENT — MIFFLIN-ST. JEOR: SCORE: 1748.83

## 2019-08-05 ASSESSMENT — MIFFLIN-ST. JEOR: SCORE: 1744.75

## 2019-08-06 ENCOUNTER — COMMUNICATION - HEALTHEAST (OUTPATIENT)
Dept: NEUROSURGERY | Facility: CLINIC | Age: 59
End: 2019-08-06

## 2019-08-06 DIAGNOSIS — S06.5XAA SDH (SUBDURAL HEMATOMA) (H): ICD-10-CM

## 2019-08-06 ASSESSMENT — MIFFLIN-ST. JEOR: SCORE: 1747.02

## 2019-08-14 ENCOUNTER — HOSPITAL ENCOUNTER (OUTPATIENT)
Dept: CT IMAGING | Facility: HOSPITAL | Age: 59
Discharge: HOME | End: 2019-08-14
Attending: NEUROLOGICAL SURGERY

## 2019-08-14 DIAGNOSIS — S06.5XAA SDH (SUBDURAL HEMATOMA) (H): ICD-10-CM

## 2019-08-15 ENCOUNTER — OFFICE VISIT - HEALTHEAST (OUTPATIENT)
Dept: NEUROSURGERY | Facility: CLINIC | Age: 59
End: 2019-08-15

## 2019-08-15 DIAGNOSIS — Z48.89 ENCOUNTER FOR POSTOPERATIVE WOUND CHECK: ICD-10-CM

## 2019-08-15 ASSESSMENT — MIFFLIN-ST. JEOR: SCORE: 1742.93

## 2019-08-29 ENCOUNTER — AMBULATORY - HEALTHEAST (OUTPATIENT)
Dept: NEUROSURGERY | Facility: CLINIC | Age: 59
End: 2019-08-29

## 2019-08-29 DIAGNOSIS — S06.5XAA SDH (SUBDURAL HEMATOMA) (H): ICD-10-CM

## 2019-08-30 ENCOUNTER — HOSPITAL ENCOUNTER (OUTPATIENT)
Dept: CT IMAGING | Facility: CLINIC | Age: 59
Discharge: HOME | End: 2019-08-30
Attending: NEUROLOGICAL SURGERY

## 2019-08-30 ENCOUNTER — OFFICE VISIT - HEALTHEAST (OUTPATIENT)
Dept: NEUROSURGERY | Facility: CLINIC | Age: 59
End: 2019-08-30

## 2019-08-30 DIAGNOSIS — S06.5XAA SDH (SUBDURAL HEMATOMA) (H): ICD-10-CM

## 2019-08-30 DIAGNOSIS — S06.5XAA SUBDURAL HEMATOMA (H): ICD-10-CM

## 2019-08-30 ASSESSMENT — MIFFLIN-ST. JEOR: SCORE: 1742.93

## 2019-09-11 ENCOUNTER — HOSPITAL ENCOUNTER (OUTPATIENT)
Dept: CT IMAGING | Facility: CLINIC | Age: 59
Discharge: HOME | End: 2019-09-11
Attending: NEUROLOGICAL SURGERY

## 2019-09-11 DIAGNOSIS — S06.5XAA SUBDURAL HEMATOMA (H): ICD-10-CM

## 2019-09-12 ENCOUNTER — COMMUNICATION - HEALTHEAST (OUTPATIENT)
Dept: NEUROSURGERY | Facility: CLINIC | Age: 59
End: 2019-09-12

## 2019-09-12 ENCOUNTER — AMBULATORY - HEALTHEAST (OUTPATIENT)
Dept: LAB | Facility: CLINIC | Age: 59
End: 2019-09-12

## 2019-09-12 ENCOUNTER — OFFICE VISIT - HEALTHEAST (OUTPATIENT)
Dept: INTERNAL MEDICINE | Facility: CLINIC | Age: 59
End: 2019-09-12

## 2019-09-12 ENCOUNTER — ANESTHESIA - HEALTHEAST (OUTPATIENT)
Dept: SURGERY | Facility: CLINIC | Age: 59
End: 2019-09-12

## 2019-09-12 ENCOUNTER — OFFICE VISIT - HEALTHEAST (OUTPATIENT)
Dept: NEUROSURGERY | Facility: CLINIC | Age: 59
End: 2019-09-12

## 2019-09-12 ENCOUNTER — RECORDS - HEALTHEAST (OUTPATIENT)
Dept: ADMINISTRATIVE | Facility: OTHER | Age: 59
End: 2019-09-12

## 2019-09-12 DIAGNOSIS — Z79.01 WARFARIN ANTICOAGULATION: ICD-10-CM

## 2019-09-12 DIAGNOSIS — S06.5XAA SDH (SUBDURAL HEMATOMA) (H): ICD-10-CM

## 2019-09-12 DIAGNOSIS — Z01.818 PRE-OP EXAM: ICD-10-CM

## 2019-09-12 DIAGNOSIS — I51.3 APICAL MURAL THROMBUS: ICD-10-CM

## 2019-09-12 DIAGNOSIS — I62.00 NONTRAUMATIC SUBDURAL HEMATOMA (H): ICD-10-CM

## 2019-09-12 DIAGNOSIS — I62.00 NONTRAUMATIC SUBDURAL HEMORRHAGE (H): ICD-10-CM

## 2019-09-12 DIAGNOSIS — Z01.818 PREOP GENERAL PHYSICAL EXAM: ICD-10-CM

## 2019-09-12 DIAGNOSIS — I10 BENIGN ESSENTIAL HYPERTENSION: ICD-10-CM

## 2019-09-12 DIAGNOSIS — N18.30 CKD (CHRONIC KIDNEY DISEASE) STAGE 3, GFR 30-59 ML/MIN (H): ICD-10-CM

## 2019-09-12 DIAGNOSIS — I25.10 CAD, MULTIPLE VESSEL: ICD-10-CM

## 2019-09-12 LAB
APTT PPP: 25 SECONDS (ref 24–37)
ATRIAL RATE - MUSE: 52 BPM
CLOSURE TME COLL+EPINEP BLD: 87 SEC (ref 1–180)
DIASTOLIC BLOOD PRESSURE - MUSE: NORMAL
ERYTHROCYTE [DISTWIDTH] IN BLOOD BY AUTOMATED COUNT: 12.1 % (ref 11–14.5)
HCT VFR BLD AUTO: 48.2 % (ref 40–54)
HGB BLD-MCNC: 16.4 G/DL (ref 14–18)
INR PPP: 1 (ref 0.9–1.1)
INTERPRETATION ECG - MUSE: NORMAL
MCH RBC QN AUTO: 32.4 PG (ref 27–34)
MCHC RBC AUTO-ENTMCNC: 34 G/DL (ref 32–36)
MCV RBC AUTO: 95 FL (ref 80–100)
P AXIS - MUSE: 30 DEGREES
PLATELET # BLD AUTO: 157 THOU/UL (ref 140–440)
PMV BLD AUTO: 9.4 FL (ref 8.5–12.5)
PR INTERVAL - MUSE: 172 MS
QRS DURATION - MUSE: 108 MS
QT - MUSE: 412 MS
QTC - MUSE: 383 MS
R AXIS - MUSE: -44 DEGREES
RBC # BLD AUTO: 5.06 MILL/UL (ref 4.4–6.2)
SYSTOLIC BLOOD PRESSURE - MUSE: NORMAL
T AXIS - MUSE: 128 DEGREES
VENTRICULAR RATE- MUSE: 52 BPM
WBC: 6 THOU/UL (ref 4–11)

## 2019-09-12 ASSESSMENT — MIFFLIN-ST. JEOR: SCORE: 1772.13

## 2019-09-13 ENCOUNTER — SURGERY - HEALTHEAST (OUTPATIENT)
Dept: SURGERY | Facility: CLINIC | Age: 59
End: 2019-09-13

## 2019-09-13 ASSESSMENT — MIFFLIN-ST. JEOR: SCORE: 1767.88

## 2019-09-14 ASSESSMENT — MIFFLIN-ST. JEOR: SCORE: 1786.93

## 2019-09-15 ASSESSMENT — MIFFLIN-ST. JEOR: SCORE: 1776.95

## 2019-09-16 ASSESSMENT — MIFFLIN-ST. JEOR: SCORE: 1765.15

## 2019-09-17 ASSESSMENT — MIFFLIN-ST. JEOR: SCORE: 1750.64

## 2019-09-18 ASSESSMENT — MIFFLIN-ST. JEOR: SCORE: 1766.51

## 2019-09-19 ASSESSMENT — MIFFLIN-ST. JEOR: SCORE: 1788.74

## 2019-09-23 ENCOUNTER — COMMUNICATION - HEALTHEAST (OUTPATIENT)
Dept: NEUROSURGERY | Facility: CLINIC | Age: 59
End: 2019-09-23

## 2019-09-23 DIAGNOSIS — S06.5XAA SDH (SUBDURAL HEMATOMA) (H): ICD-10-CM

## 2019-09-23 DIAGNOSIS — Z98.890 S/P CRANIOTOMY: ICD-10-CM

## 2019-09-26 ENCOUNTER — OFFICE VISIT - HEALTHEAST (OUTPATIENT)
Dept: INTERNAL MEDICINE | Facility: CLINIC | Age: 59
End: 2019-09-26

## 2019-09-26 DIAGNOSIS — S06.5XAA SDH (SUBDURAL HEMATOMA) (H): ICD-10-CM

## 2019-09-26 DIAGNOSIS — G93.5 BRAIN COMPRESSION (H): ICD-10-CM

## 2019-09-26 DIAGNOSIS — Z12.11 SCREEN FOR COLON CANCER: ICD-10-CM

## 2019-09-26 DIAGNOSIS — G40.909 NONINTRACTABLE EPILEPSY WITHOUT STATUS EPILEPTICUS, UNSPECIFIED EPILEPSY TYPE (H): ICD-10-CM

## 2019-09-26 DIAGNOSIS — E78.5 HYPERLIPEMIA: ICD-10-CM

## 2019-09-26 RX ORDER — AMLODIPINE BESYLATE 10 MG/1
10 TABLET ORAL DAILY
Qty: 30 TABLET | Refills: 5 | Status: SHIPPED | OUTPATIENT
Start: 2019-09-26

## 2019-09-26 ASSESSMENT — MIFFLIN-ST. JEOR: SCORE: 1769.86

## 2019-09-30 ENCOUNTER — COMMUNICATION - HEALTHEAST (OUTPATIENT)
Dept: ANTICOAGULATION | Facility: CLINIC | Age: 59
End: 2019-09-30

## 2019-09-30 DIAGNOSIS — I25.10 CAD, MULTIPLE VESSEL: ICD-10-CM

## 2019-09-30 DIAGNOSIS — I51.3 APICAL MURAL THROMBUS: ICD-10-CM

## 2019-10-02 ENCOUNTER — HOSPITAL ENCOUNTER (OUTPATIENT)
Dept: CT IMAGING | Facility: CLINIC | Age: 59
Discharge: HOME | End: 2019-10-02
Attending: NEUROLOGICAL SURGERY

## 2019-10-02 ENCOUNTER — HOSPITAL ENCOUNTER (OUTPATIENT)
Dept: ULTRASOUND IMAGING | Facility: CLINIC | Age: 59
Discharge: HOME | End: 2019-10-02
Attending: NEUROLOGICAL SURGERY

## 2019-10-02 ENCOUNTER — OFFICE VISIT - HEALTHEAST (OUTPATIENT)
Dept: NEUROSURGERY | Facility: CLINIC | Age: 59
End: 2019-10-02

## 2019-10-02 DIAGNOSIS — S06.5XAA SDH (SUBDURAL HEMATOMA) (H): ICD-10-CM

## 2019-10-02 DIAGNOSIS — I62.03 NONTRAUMATIC CHRONIC SUBDURAL HEMORRHAGE (H): ICD-10-CM

## 2019-10-02 DIAGNOSIS — Z98.890 S/P CRANIOTOMY: ICD-10-CM

## 2019-10-07 ENCOUNTER — COMMUNICATION - HEALTHEAST (OUTPATIENT)
Dept: ONCOLOGY | Facility: HOSPITAL | Age: 59
End: 2019-10-07

## 2019-10-07 ENCOUNTER — RECORDS - HEALTHEAST (OUTPATIENT)
Dept: ANTICOAGULATION | Facility: CLINIC | Age: 59
End: 2019-10-07

## 2019-10-07 ENCOUNTER — COMMUNICATION - HEALTHEAST (OUTPATIENT)
Dept: ANTICOAGULATION | Facility: CLINIC | Age: 59
End: 2019-10-07

## 2019-10-07 ENCOUNTER — COMMUNICATION - HEALTHEAST (OUTPATIENT)
Dept: NEUROSURGERY | Facility: CLINIC | Age: 59
End: 2019-10-07

## 2019-10-07 DIAGNOSIS — I51.3 APICAL MURAL THROMBUS: ICD-10-CM

## 2019-10-07 DIAGNOSIS — S06.5XAA SDH (SUBDURAL HEMATOMA) (H): ICD-10-CM

## 2019-10-07 DIAGNOSIS — Z79.01 ANTICOAGULATED: ICD-10-CM

## 2019-10-07 DIAGNOSIS — I25.10 CAD, MULTIPLE VESSEL: ICD-10-CM

## 2019-10-09 ENCOUNTER — COMMUNICATION - HEALTHEAST (OUTPATIENT)
Dept: ANTICOAGULATION | Facility: CLINIC | Age: 59
End: 2019-10-09

## 2019-10-09 ENCOUNTER — AMBULATORY - HEALTHEAST (OUTPATIENT)
Dept: LAB | Facility: CLINIC | Age: 59
End: 2019-10-09

## 2019-10-09 DIAGNOSIS — I25.10 CAD, MULTIPLE VESSEL: ICD-10-CM

## 2019-10-09 DIAGNOSIS — I51.3 APICAL MURAL THROMBUS: ICD-10-CM

## 2019-10-09 LAB — INR PPP: 1.8 (ref 0.9–1.1)

## 2019-10-10 ENCOUNTER — OFFICE VISIT - HEALTHEAST (OUTPATIENT)
Dept: INTERNAL MEDICINE | Facility: CLINIC | Age: 59
End: 2019-10-10

## 2019-10-10 ENCOUNTER — RECORDS - HEALTHEAST (OUTPATIENT)
Dept: ADMINISTRATIVE | Facility: OTHER | Age: 59
End: 2019-10-10

## 2019-10-10 DIAGNOSIS — G40.909 NONINTRACTABLE EPILEPSY WITHOUT STATUS EPILEPTICUS, UNSPECIFIED EPILEPSY TYPE (H): ICD-10-CM

## 2019-10-10 DIAGNOSIS — I82.412 ACUTE DEEP VEIN THROMBOSIS (DVT) OF FEMORAL VEIN OF LEFT LOWER EXTREMITY (H): ICD-10-CM

## 2019-10-10 DIAGNOSIS — Z12.11 SCREEN FOR COLON CANCER: ICD-10-CM

## 2019-10-10 DIAGNOSIS — Z79.01 WARFARIN ANTICOAGULATION: ICD-10-CM

## 2019-10-10 DIAGNOSIS — I82.412 ACUTE DEEP VEIN THROMBOSIS OF LEFT FEMORAL VEIN (H): ICD-10-CM

## 2019-10-10 ASSESSMENT — MIFFLIN-ST. JEOR: SCORE: 1799.63

## 2019-10-14 ENCOUNTER — COMMUNICATION - HEALTHEAST (OUTPATIENT)
Dept: ANTICOAGULATION | Facility: CLINIC | Age: 59
End: 2019-10-14

## 2019-10-14 ENCOUNTER — AMBULATORY - HEALTHEAST (OUTPATIENT)
Dept: LAB | Facility: CLINIC | Age: 59
End: 2019-10-14

## 2019-10-14 DIAGNOSIS — I25.10 CAD, MULTIPLE VESSEL: ICD-10-CM

## 2019-10-14 DIAGNOSIS — I51.3 APICAL MURAL THROMBUS: ICD-10-CM

## 2019-10-14 LAB — INR PPP: 2.1 (ref 0.9–1.1)

## 2019-10-16 ENCOUNTER — RECORDS - HEALTHEAST (OUTPATIENT)
Dept: ANTICOAGULATION | Facility: CLINIC | Age: 59
End: 2019-10-16

## 2019-10-16 ENCOUNTER — HOSPITAL ENCOUNTER (OUTPATIENT)
Dept: CT IMAGING | Facility: HOSPITAL | Age: 59
Discharge: HOME | End: 2019-10-16
Attending: NEUROLOGICAL SURGERY

## 2019-10-16 DIAGNOSIS — S06.5XAA SDH (SUBDURAL HEMATOMA) (H): ICD-10-CM

## 2019-10-16 DIAGNOSIS — Z79.01 ANTICOAGULATED: ICD-10-CM

## 2019-10-17 ENCOUNTER — OFFICE VISIT - HEALTHEAST (OUTPATIENT)
Dept: NEUROSURGERY | Facility: CLINIC | Age: 59
End: 2019-10-17

## 2019-10-17 DIAGNOSIS — S06.5XAA SDH (SUBDURAL HEMATOMA) (H): ICD-10-CM

## 2019-10-17 ASSESSMENT — MIFFLIN-ST. JEOR: SCORE: 1799.63

## 2019-10-22 ENCOUNTER — COMMUNICATION - HEALTHEAST (OUTPATIENT)
Dept: ANTICOAGULATION | Facility: CLINIC | Age: 59
End: 2019-10-22

## 2019-10-22 ENCOUNTER — AMBULATORY - HEALTHEAST (OUTPATIENT)
Dept: LAB | Facility: CLINIC | Age: 59
End: 2019-10-22

## 2019-10-22 ENCOUNTER — OFFICE VISIT - HEALTHEAST (OUTPATIENT)
Dept: INTERNAL MEDICINE | Facility: CLINIC | Age: 59
End: 2019-10-22

## 2019-10-22 DIAGNOSIS — I51.3 APICAL MURAL THROMBUS: ICD-10-CM

## 2019-10-22 DIAGNOSIS — I25.10 CAD, MULTIPLE VESSEL: ICD-10-CM

## 2019-10-22 DIAGNOSIS — S06.5XAA SDH (SUBDURAL HEMATOMA) (H): ICD-10-CM

## 2019-10-22 DIAGNOSIS — Z79.01 WARFARIN ANTICOAGULATION: ICD-10-CM

## 2019-10-22 DIAGNOSIS — Z12.11 SCREEN FOR COLON CANCER: ICD-10-CM

## 2019-10-22 LAB — INR PPP: 3.2 (ref 0.9–1.1)

## 2019-10-22 ASSESSMENT — MIFFLIN-ST. JEOR: SCORE: 1786.02

## 2019-10-29 ENCOUNTER — AMBULATORY - HEALTHEAST (OUTPATIENT)
Dept: LAB | Facility: CLINIC | Age: 59
End: 2019-10-29

## 2019-10-29 ENCOUNTER — COMMUNICATION - HEALTHEAST (OUTPATIENT)
Dept: ANTICOAGULATION | Facility: CLINIC | Age: 59
End: 2019-10-29

## 2019-10-29 DIAGNOSIS — I51.3 APICAL MURAL THROMBUS: ICD-10-CM

## 2019-10-29 DIAGNOSIS — I25.10 CAD, MULTIPLE VESSEL: ICD-10-CM

## 2019-10-29 LAB — INR PPP: 3.9 (ref 0.9–1.1)

## 2019-10-30 ENCOUNTER — OFFICE VISIT - HEALTHEAST (OUTPATIENT)
Dept: PHARMACY | Facility: CLINIC | Age: 59
End: 2019-10-30

## 2019-10-30 DIAGNOSIS — I51.3 APICAL MURAL THROMBUS: ICD-10-CM

## 2019-11-05 ENCOUNTER — RECORDS - HEALTHEAST (OUTPATIENT)
Dept: ADMINISTRATIVE | Facility: OTHER | Age: 59
End: 2019-11-05

## 2019-11-05 LAB — COLOGUARD-ABSTRACT: POSITIVE

## 2019-11-12 ENCOUNTER — AMBULATORY - HEALTHEAST (OUTPATIENT)
Dept: LAB | Facility: CLINIC | Age: 59
End: 2019-11-12

## 2019-11-12 ENCOUNTER — COMMUNICATION - HEALTHEAST (OUTPATIENT)
Dept: ANTICOAGULATION | Facility: CLINIC | Age: 59
End: 2019-11-12

## 2019-11-12 DIAGNOSIS — I25.10 CAD, MULTIPLE VESSEL: ICD-10-CM

## 2019-11-12 DIAGNOSIS — I51.3 APICAL MURAL THROMBUS: ICD-10-CM

## 2019-11-12 LAB — INR PPP: 2.6 (ref 0.9–1.1)

## 2019-11-13 ENCOUNTER — COMMUNICATION - HEALTHEAST (OUTPATIENT)
Dept: INTERNAL MEDICINE | Facility: CLINIC | Age: 59
End: 2019-11-13

## 2019-11-13 DIAGNOSIS — Z12.11 COLON CANCER SCREENING: ICD-10-CM

## 2019-11-18 ENCOUNTER — RECORDS - HEALTHEAST (OUTPATIENT)
Dept: HEALTH INFORMATION MANAGEMENT | Facility: CLINIC | Age: 59
End: 2019-11-18

## 2019-11-26 ENCOUNTER — COMMUNICATION - HEALTHEAST (OUTPATIENT)
Dept: ANTICOAGULATION | Facility: CLINIC | Age: 59
End: 2019-11-26

## 2019-11-26 ENCOUNTER — AMBULATORY - HEALTHEAST (OUTPATIENT)
Dept: LAB | Facility: CLINIC | Age: 59
End: 2019-11-26

## 2019-11-26 DIAGNOSIS — I25.10 CAD, MULTIPLE VESSEL: ICD-10-CM

## 2019-11-26 DIAGNOSIS — I51.3 APICAL MURAL THROMBUS: ICD-10-CM

## 2019-11-26 LAB — INR PPP: 2.3 (ref 0.9–1.1)

## 2019-12-03 ENCOUNTER — OFFICE VISIT - HEALTHEAST (OUTPATIENT)
Dept: INTERNAL MEDICINE | Facility: CLINIC | Age: 59
End: 2019-12-03

## 2019-12-03 DIAGNOSIS — R19.5 POSITIVE COLORECTAL CANCER SCREENING USING COLOGUARD TEST: ICD-10-CM

## 2019-12-03 DIAGNOSIS — I15.9 SECONDARY HYPERTENSION: ICD-10-CM

## 2019-12-03 DIAGNOSIS — G93.5 BRAIN COMPRESSION (H): ICD-10-CM

## 2019-12-03 DIAGNOSIS — S06.5XAA SDH (SUBDURAL HEMATOMA) (H): ICD-10-CM

## 2019-12-03 ASSESSMENT — MIFFLIN-ST. JEOR: SCORE: 1797.64

## 2019-12-05 ENCOUNTER — OFFICE VISIT - HEALTHEAST (OUTPATIENT)
Dept: NEUROSURGERY | Facility: CLINIC | Age: 59
End: 2019-12-05

## 2019-12-05 ENCOUNTER — HOSPITAL ENCOUNTER (OUTPATIENT)
Dept: CT IMAGING | Facility: HOSPITAL | Age: 59
Discharge: HOME | End: 2019-12-05

## 2019-12-05 DIAGNOSIS — S06.5XAA SDH (SUBDURAL HEMATOMA) (H): ICD-10-CM

## 2019-12-05 ASSESSMENT — MIFFLIN-ST. JEOR: SCORE: 1795.09

## 2019-12-09 ENCOUNTER — COMMUNICATION - HEALTHEAST (OUTPATIENT)
Dept: INTERNAL MEDICINE | Facility: CLINIC | Age: 59
End: 2019-12-09

## 2019-12-12 ENCOUNTER — COMMUNICATION - HEALTHEAST (OUTPATIENT)
Dept: ANTICOAGULATION | Facility: CLINIC | Age: 59
End: 2019-12-12

## 2019-12-12 ENCOUNTER — AMBULATORY - HEALTHEAST (OUTPATIENT)
Dept: LAB | Facility: CLINIC | Age: 59
End: 2019-12-12

## 2019-12-12 DIAGNOSIS — I25.10 CAD, MULTIPLE VESSEL: ICD-10-CM

## 2019-12-12 DIAGNOSIS — I51.3 APICAL MURAL THROMBUS: ICD-10-CM

## 2019-12-12 LAB — INR PPP: 1.5 (ref 0.9–1.1)

## 2019-12-18 ENCOUNTER — RECORDS - HEALTHEAST (OUTPATIENT)
Dept: ADMINISTRATIVE | Facility: OTHER | Age: 59
End: 2019-12-18

## 2019-12-23 ENCOUNTER — COMMUNICATION - HEALTHEAST (OUTPATIENT)
Dept: INTERNAL MEDICINE | Facility: CLINIC | Age: 59
End: 2019-12-23

## 2019-12-23 ENCOUNTER — COMMUNICATION - HEALTHEAST (OUTPATIENT)
Dept: ANTICOAGULATION | Facility: CLINIC | Age: 59
End: 2019-12-23

## 2019-12-23 ENCOUNTER — AMBULATORY - HEALTHEAST (OUTPATIENT)
Dept: LAB | Facility: CLINIC | Age: 59
End: 2019-12-23

## 2019-12-23 DIAGNOSIS — I25.10 CAD, MULTIPLE VESSEL: ICD-10-CM

## 2019-12-23 DIAGNOSIS — S06.5XAA SDH (SUBDURAL HEMATOMA) (H): ICD-10-CM

## 2019-12-23 DIAGNOSIS — I51.3 APICAL MURAL THROMBUS: ICD-10-CM

## 2019-12-23 DIAGNOSIS — G93.5 BRAIN COMPRESSION (H): ICD-10-CM

## 2019-12-23 LAB — INR PPP: 1.3 (ref 0.9–1.1)

## 2019-12-27 ENCOUNTER — AMBULATORY - HEALTHEAST (OUTPATIENT)
Dept: LAB | Facility: CLINIC | Age: 59
End: 2019-12-27

## 2019-12-27 ENCOUNTER — COMMUNICATION - HEALTHEAST (OUTPATIENT)
Dept: ANTICOAGULATION | Facility: CLINIC | Age: 59
End: 2019-12-27

## 2019-12-27 DIAGNOSIS — I51.3 APICAL MURAL THROMBUS: ICD-10-CM

## 2019-12-27 DIAGNOSIS — I25.10 CAD, MULTIPLE VESSEL: ICD-10-CM

## 2019-12-27 LAB — INR PPP: 1.6 (ref 0.9–1.1)

## 2020-01-02 ENCOUNTER — AMBULATORY - HEALTHEAST (OUTPATIENT)
Dept: LAB | Facility: CLINIC | Age: 60
End: 2020-01-02

## 2020-01-02 ENCOUNTER — COMMUNICATION - HEALTHEAST (OUTPATIENT)
Dept: ANTICOAGULATION | Facility: CLINIC | Age: 60
End: 2020-01-02

## 2020-01-02 DIAGNOSIS — I25.10 CAD, MULTIPLE VESSEL: ICD-10-CM

## 2020-01-02 DIAGNOSIS — I51.3 APICAL MURAL THROMBUS: ICD-10-CM

## 2020-01-02 LAB — INR PPP: 2.5 (ref 0.9–1.1)

## 2020-01-14 ENCOUNTER — COMMUNICATION - HEALTHEAST (OUTPATIENT)
Dept: NEUROSURGERY | Facility: CLINIC | Age: 60
End: 2020-01-14

## 2020-01-14 ENCOUNTER — OFFICE VISIT - HEALTHEAST (OUTPATIENT)
Dept: FAMILY MEDICINE | Facility: CLINIC | Age: 60
End: 2020-01-14

## 2020-01-14 DIAGNOSIS — S99.922A FOOT INJURY, LEFT, INITIAL ENCOUNTER: ICD-10-CM

## 2020-01-16 ENCOUNTER — COMMUNICATION - HEALTHEAST (OUTPATIENT)
Dept: INTERNAL MEDICINE | Facility: CLINIC | Age: 60
End: 2020-01-16

## 2020-01-16 ENCOUNTER — COMMUNICATION - HEALTHEAST (OUTPATIENT)
Dept: ANTICOAGULATION | Facility: CLINIC | Age: 60
End: 2020-01-16

## 2020-01-16 ENCOUNTER — RECORDS - HEALTHEAST (OUTPATIENT)
Dept: ADMINISTRATIVE | Facility: OTHER | Age: 60
End: 2020-01-16

## 2020-01-16 ENCOUNTER — AMBULATORY - HEALTHEAST (OUTPATIENT)
Dept: LAB | Facility: CLINIC | Age: 60
End: 2020-01-16

## 2020-01-16 DIAGNOSIS — I25.10 CAD, MULTIPLE VESSEL: ICD-10-CM

## 2020-01-16 DIAGNOSIS — I51.3 APICAL MURAL THROMBUS: ICD-10-CM

## 2020-01-16 LAB — INR PPP: 2.4 (ref 0.9–1.1)

## 2020-01-28 ENCOUNTER — HOSPITAL ENCOUNTER (OUTPATIENT)
Dept: CT IMAGING | Facility: HOSPITAL | Age: 60
Discharge: HOME | End: 2020-01-28

## 2020-01-28 DIAGNOSIS — S06.5XAA SDH (SUBDURAL HEMATOMA) (H): ICD-10-CM

## 2020-01-30 ENCOUNTER — AMBULATORY - HEALTHEAST (OUTPATIENT)
Dept: LAB | Facility: CLINIC | Age: 60
End: 2020-01-30

## 2020-01-30 ENCOUNTER — COMMUNICATION - HEALTHEAST (OUTPATIENT)
Dept: ANTICOAGULATION | Facility: CLINIC | Age: 60
End: 2020-01-30

## 2020-01-30 ENCOUNTER — COMMUNICATION - HEALTHEAST (OUTPATIENT)
Dept: INTERNAL MEDICINE | Facility: CLINIC | Age: 60
End: 2020-01-30

## 2020-01-30 ENCOUNTER — OFFICE VISIT - HEALTHEAST (OUTPATIENT)
Dept: NEUROSURGERY | Facility: CLINIC | Age: 60
End: 2020-01-30

## 2020-01-30 DIAGNOSIS — I51.3 APICAL MURAL THROMBUS: ICD-10-CM

## 2020-01-30 DIAGNOSIS — I25.10 CAD, MULTIPLE VESSEL: ICD-10-CM

## 2020-01-30 DIAGNOSIS — G93.5 BRAIN COMPRESSION (H): ICD-10-CM

## 2020-01-30 DIAGNOSIS — S06.5XAA SDH (SUBDURAL HEMATOMA) (H): ICD-10-CM

## 2020-01-30 LAB — INR PPP: 2.2 (ref 0.9–1.1)

## 2020-01-30 ASSESSMENT — MIFFLIN-ST. JEOR: SCORE: 1781.48

## 2020-02-27 ENCOUNTER — AMBULATORY - HEALTHEAST (OUTPATIENT)
Dept: LAB | Facility: CLINIC | Age: 60
End: 2020-02-27

## 2020-02-27 ENCOUNTER — COMMUNICATION - HEALTHEAST (OUTPATIENT)
Dept: ANTICOAGULATION | Facility: CLINIC | Age: 60
End: 2020-02-27

## 2020-02-27 DIAGNOSIS — I51.3 APICAL MURAL THROMBUS: ICD-10-CM

## 2020-02-27 DIAGNOSIS — I25.10 CAD, MULTIPLE VESSEL: ICD-10-CM

## 2020-02-27 LAB — INR PPP: 2.4 (ref 0.9–1.1)

## 2020-03-03 ENCOUNTER — OFFICE VISIT - HEALTHEAST (OUTPATIENT)
Dept: INTERNAL MEDICINE | Facility: CLINIC | Age: 60
End: 2020-03-03

## 2020-03-03 DIAGNOSIS — Z79.01 WARFARIN ANTICOAGULATION: ICD-10-CM

## 2020-03-03 DIAGNOSIS — D12.6 ADENOMATOUS POLYP OF COLON, UNSPECIFIED PART OF COLON: ICD-10-CM

## 2020-03-03 DIAGNOSIS — R19.5 POSITIVE COLORECTAL CANCER SCREENING USING COLOGUARD TEST: ICD-10-CM

## 2020-03-03 DIAGNOSIS — G40.909 NONINTRACTABLE EPILEPSY WITHOUT STATUS EPILEPTICUS, UNSPECIFIED EPILEPSY TYPE (H): ICD-10-CM

## 2020-03-03 DIAGNOSIS — I10 PRIMARY HYPERTENSION: ICD-10-CM

## 2020-03-03 ASSESSMENT — MIFFLIN-ST. JEOR: SCORE: 1813.8

## 2020-03-26 ENCOUNTER — AMBULATORY - HEALTHEAST (OUTPATIENT)
Dept: LAB | Facility: CLINIC | Age: 60
End: 2020-03-26

## 2020-03-26 ENCOUNTER — COMMUNICATION - HEALTHEAST (OUTPATIENT)
Dept: ANTICOAGULATION | Facility: CLINIC | Age: 60
End: 2020-03-26

## 2020-03-26 DIAGNOSIS — I25.10 CAD, MULTIPLE VESSEL: ICD-10-CM

## 2020-03-26 DIAGNOSIS — I51.3 APICAL MURAL THROMBUS: ICD-10-CM

## 2020-03-26 LAB — INR PPP: 2.8 (ref 0.9–1.1)

## 2020-04-17 ENCOUNTER — COMMUNICATION - HEALTHEAST (OUTPATIENT)
Dept: ANTICOAGULATION | Facility: CLINIC | Age: 60
End: 2020-04-17

## 2020-04-17 DIAGNOSIS — I82.412 ACUTE DEEP VEIN THROMBOSIS OF LEFT FEMORAL VEIN (H): ICD-10-CM

## 2020-04-17 DIAGNOSIS — I51.3 APICAL MURAL THROMBUS: ICD-10-CM

## 2020-04-23 ENCOUNTER — AMBULATORY - HEALTHEAST (OUTPATIENT)
Dept: LAB | Facility: CLINIC | Age: 60
End: 2020-04-23

## 2020-04-23 ENCOUNTER — COMMUNICATION - HEALTHEAST (OUTPATIENT)
Dept: ANTICOAGULATION | Facility: CLINIC | Age: 60
End: 2020-04-23

## 2020-04-23 DIAGNOSIS — I51.3 APICAL MURAL THROMBUS: ICD-10-CM

## 2020-04-23 DIAGNOSIS — I82.412 ACUTE DEEP VEIN THROMBOSIS OF LEFT FEMORAL VEIN (H): ICD-10-CM

## 2020-04-23 DIAGNOSIS — I25.10 CAD, MULTIPLE VESSEL: ICD-10-CM

## 2020-04-23 LAB — INR PPP: 2.5 (ref 0.9–1.1)

## 2020-04-24 ENCOUNTER — COMMUNICATION - HEALTHEAST (OUTPATIENT)
Dept: INTERNAL MEDICINE | Facility: CLINIC | Age: 60
End: 2020-04-24

## 2020-04-24 DIAGNOSIS — I25.10 CAD, MULTIPLE VESSEL: ICD-10-CM

## 2020-04-24 DIAGNOSIS — I51.3 APICAL MURAL THROMBUS: ICD-10-CM

## 2020-05-11 ENCOUNTER — COMMUNICATION - HEALTHEAST (OUTPATIENT)
Dept: SCHEDULING | Facility: CLINIC | Age: 60
End: 2020-05-11

## 2020-05-11 DIAGNOSIS — G40.909 NONINTRACTABLE EPILEPSY WITHOUT STATUS EPILEPTICUS, UNSPECIFIED EPILEPSY TYPE (H): ICD-10-CM

## 2020-05-11 RX ORDER — LAMOTRIGINE 200 MG/1
TABLET ORAL
Qty: 270 TABLET | Refills: 11 | Status: SHIPPED | OUTPATIENT
Start: 2020-05-11

## 2020-05-14 ENCOUNTER — COMMUNICATION - HEALTHEAST (OUTPATIENT)
Dept: INTERNAL MEDICINE | Facility: CLINIC | Age: 60
End: 2020-05-14

## 2020-05-20 ENCOUNTER — COMMUNICATION - HEALTHEAST (OUTPATIENT)
Dept: ANTICOAGULATION | Facility: CLINIC | Age: 60
End: 2020-05-20

## 2020-05-20 ENCOUNTER — AMBULATORY - HEALTHEAST (OUTPATIENT)
Dept: LAB | Facility: CLINIC | Age: 60
End: 2020-05-20

## 2020-05-20 ENCOUNTER — COMMUNICATION - HEALTHEAST (OUTPATIENT)
Dept: INTERNAL MEDICINE | Facility: CLINIC | Age: 60
End: 2020-05-20

## 2020-05-20 DIAGNOSIS — I82.412 ACUTE DEEP VEIN THROMBOSIS OF LEFT FEMORAL VEIN (H): ICD-10-CM

## 2020-05-20 DIAGNOSIS — I51.3 APICAL MURAL THROMBUS: ICD-10-CM

## 2020-05-20 DIAGNOSIS — I25.10 CAD, MULTIPLE VESSEL: ICD-10-CM

## 2020-05-20 LAB — INR PPP: 2 (ref 0.9–1.1)

## 2020-05-27 ENCOUNTER — COMMUNICATION - HEALTHEAST (OUTPATIENT)
Dept: INTERNAL MEDICINE | Facility: CLINIC | Age: 60
End: 2020-05-27

## 2020-05-27 DIAGNOSIS — E78.5 HYPERLIPEMIA: ICD-10-CM

## 2020-06-01 RX ORDER — ATORVASTATIN CALCIUM 40 MG/1
TABLET, FILM COATED ORAL
Qty: 90 TABLET | Refills: 2 | Status: SHIPPED | OUTPATIENT
Start: 2020-06-01

## 2020-06-18 ENCOUNTER — AMBULATORY - HEALTHEAST (OUTPATIENT)
Dept: LAB | Facility: CLINIC | Age: 60
End: 2020-06-18

## 2020-06-18 ENCOUNTER — COMMUNICATION - HEALTHEAST (OUTPATIENT)
Dept: ANTICOAGULATION | Facility: CLINIC | Age: 60
End: 2020-06-18

## 2020-06-18 DIAGNOSIS — I82.412 ACUTE DEEP VEIN THROMBOSIS OF LEFT FEMORAL VEIN (H): ICD-10-CM

## 2020-06-18 DIAGNOSIS — I51.3 APICAL MURAL THROMBUS: ICD-10-CM

## 2020-06-18 DIAGNOSIS — I25.10 CAD, MULTIPLE VESSEL: ICD-10-CM

## 2020-06-18 LAB — INR PPP: 2.4 (ref 0.9–1.1)

## 2020-07-06 ENCOUNTER — RECORDS - HEALTHEAST (OUTPATIENT)
Dept: ADMINISTRATIVE | Facility: OTHER | Age: 60
End: 2020-07-06

## 2020-07-16 ENCOUNTER — COMMUNICATION - HEALTHEAST (OUTPATIENT)
Dept: ANTICOAGULATION | Facility: CLINIC | Age: 60
End: 2020-07-16

## 2020-07-16 ENCOUNTER — AMBULATORY - HEALTHEAST (OUTPATIENT)
Dept: LAB | Facility: CLINIC | Age: 60
End: 2020-07-16

## 2020-07-16 DIAGNOSIS — I51.3 APICAL MURAL THROMBUS: ICD-10-CM

## 2020-07-16 DIAGNOSIS — I25.10 CAD, MULTIPLE VESSEL: ICD-10-CM

## 2020-07-16 DIAGNOSIS — I82.412 ACUTE DEEP VEIN THROMBOSIS OF LEFT FEMORAL VEIN (H): ICD-10-CM

## 2020-07-16 LAB — INR PPP: 2.7 (ref 0.9–1.1)

## 2020-07-21 ENCOUNTER — RECORDS - HEALTHEAST (OUTPATIENT)
Dept: ADMINISTRATIVE | Facility: OTHER | Age: 60
End: 2020-07-21

## 2020-07-21 ENCOUNTER — COMMUNICATION - HEALTHEAST (OUTPATIENT)
Dept: INTERNAL MEDICINE | Facility: CLINIC | Age: 60
End: 2020-07-21

## 2020-07-21 DIAGNOSIS — I10 PRIMARY HYPERTENSION: ICD-10-CM

## 2020-08-14 ENCOUNTER — COMMUNICATION - HEALTHEAST (OUTPATIENT)
Dept: INTERNAL MEDICINE | Facility: CLINIC | Age: 60
End: 2020-08-14

## 2020-08-14 ENCOUNTER — COMMUNICATION - HEALTHEAST (OUTPATIENT)
Dept: ANTICOAGULATION | Facility: CLINIC | Age: 60
End: 2020-08-14

## 2020-08-14 DIAGNOSIS — I82.412 ACUTE DEEP VEIN THROMBOSIS OF LEFT FEMORAL VEIN (H): ICD-10-CM

## 2020-08-14 DIAGNOSIS — I51.3 APICAL MURAL THROMBUS: ICD-10-CM

## 2020-08-14 DIAGNOSIS — I25.10 CAD, MULTIPLE VESSEL: ICD-10-CM

## 2020-08-27 ENCOUNTER — COMMUNICATION - HEALTHEAST (OUTPATIENT)
Dept: ANTICOAGULATION | Facility: CLINIC | Age: 60
End: 2020-08-27

## 2020-08-27 ENCOUNTER — AMBULATORY - HEALTHEAST (OUTPATIENT)
Dept: LAB | Facility: CLINIC | Age: 60
End: 2020-08-27

## 2020-08-27 DIAGNOSIS — I25.10 CAD, MULTIPLE VESSEL: ICD-10-CM

## 2020-08-27 DIAGNOSIS — I51.3 APICAL MURAL THROMBUS: ICD-10-CM

## 2020-08-27 DIAGNOSIS — I82.412 ACUTE DEEP VEIN THROMBOSIS OF LEFT FEMORAL VEIN (H): ICD-10-CM

## 2020-08-27 LAB — INR PPP: 2.4 (ref 0.9–1.1)

## 2020-09-10 ENCOUNTER — OFFICE VISIT - HEALTHEAST (OUTPATIENT)
Dept: INTERNAL MEDICINE | Facility: CLINIC | Age: 60
End: 2020-09-10

## 2020-09-10 DIAGNOSIS — N18.30 CKD (CHRONIC KIDNEY DISEASE) STAGE 3, GFR 30-59 ML/MIN (H): ICD-10-CM

## 2020-09-10 DIAGNOSIS — Z79.01 WARFARIN ANTICOAGULATION: ICD-10-CM

## 2020-09-10 DIAGNOSIS — I10 HYPERTENSION, UNSPECIFIED TYPE: ICD-10-CM

## 2020-09-10 DIAGNOSIS — G40.909 NONINTRACTABLE EPILEPSY WITHOUT STATUS EPILEPTICUS, UNSPECIFIED EPILEPSY TYPE (H): ICD-10-CM

## 2020-09-10 ASSESSMENT — MIFFLIN-ST. JEOR: SCORE: 1831.38

## 2020-09-15 ENCOUNTER — COMMUNICATION - HEALTHEAST (OUTPATIENT)
Dept: ANTICOAGULATION | Facility: CLINIC | Age: 60
End: 2020-09-15

## 2020-09-15 DIAGNOSIS — I25.10 CAD, MULTIPLE VESSEL: ICD-10-CM

## 2020-09-15 DIAGNOSIS — I51.3 APICAL MURAL THROMBUS: ICD-10-CM

## 2020-09-21 ENCOUNTER — AMBULATORY - HEALTHEAST (OUTPATIENT)
Dept: LAB | Facility: CLINIC | Age: 60
End: 2020-09-21

## 2020-09-21 ENCOUNTER — COMMUNICATION - HEALTHEAST (OUTPATIENT)
Dept: ANTICOAGULATION | Facility: CLINIC | Age: 60
End: 2020-09-21

## 2020-09-21 DIAGNOSIS — I51.3 APICAL MURAL THROMBUS: ICD-10-CM

## 2020-09-21 DIAGNOSIS — I82.412 ACUTE DEEP VEIN THROMBOSIS OF LEFT FEMORAL VEIN (H): ICD-10-CM

## 2020-09-21 DIAGNOSIS — I25.10 CAD, MULTIPLE VESSEL: ICD-10-CM

## 2020-09-21 LAB — INR PPP: 2.9 (ref 0.9–1.1)

## 2020-09-23 ENCOUNTER — COMMUNICATION - HEALTHEAST (OUTPATIENT)
Dept: INTERNAL MEDICINE | Facility: CLINIC | Age: 60
End: 2020-09-23

## 2020-09-23 DIAGNOSIS — I82.412 ACUTE DEEP VEIN THROMBOSIS OF LEFT FEMORAL VEIN (H): ICD-10-CM

## 2020-10-19 ENCOUNTER — COMMUNICATION - HEALTHEAST (OUTPATIENT)
Dept: ANTICOAGULATION | Facility: CLINIC | Age: 60
End: 2020-10-19

## 2020-10-19 ENCOUNTER — AMBULATORY - HEALTHEAST (OUTPATIENT)
Dept: LAB | Facility: CLINIC | Age: 60
End: 2020-10-19

## 2020-10-19 DIAGNOSIS — I51.3 APICAL MURAL THROMBUS: ICD-10-CM

## 2020-10-19 DIAGNOSIS — I82.412 ACUTE DEEP VEIN THROMBOSIS OF LEFT FEMORAL VEIN (H): ICD-10-CM

## 2020-10-19 DIAGNOSIS — I25.10 CAD, MULTIPLE VESSEL: ICD-10-CM

## 2020-10-19 LAB — INR PPP: 2.8 (ref 0.9–1.1)

## 2020-11-16 ENCOUNTER — COMMUNICATION - HEALTHEAST (OUTPATIENT)
Dept: ANTICOAGULATION | Facility: CLINIC | Age: 60
End: 2020-11-16

## 2020-11-16 ENCOUNTER — AMBULATORY - HEALTHEAST (OUTPATIENT)
Dept: LAB | Facility: CLINIC | Age: 60
End: 2020-11-16

## 2020-11-16 DIAGNOSIS — I51.3 APICAL MURAL THROMBUS: ICD-10-CM

## 2020-11-16 DIAGNOSIS — I82.412 ACUTE DEEP VEIN THROMBOSIS OF LEFT FEMORAL VEIN (H): ICD-10-CM

## 2020-11-16 DIAGNOSIS — I25.10 CAD, MULTIPLE VESSEL: ICD-10-CM

## 2020-11-16 LAB — INR PPP: 2.3 (ref 0.9–1.1)

## 2020-11-20 ENCOUNTER — COMMUNICATION - HEALTHEAST (OUTPATIENT)
Dept: INTERNAL MEDICINE | Facility: CLINIC | Age: 60
End: 2020-11-20

## 2020-11-20 DIAGNOSIS — S06.5XAA SDH (SUBDURAL HEMATOMA) (H): ICD-10-CM

## 2020-11-20 DIAGNOSIS — G93.5 BRAIN COMPRESSION (H): ICD-10-CM

## 2020-12-01 ENCOUNTER — COMMUNICATION - HEALTHEAST (OUTPATIENT)
Dept: INTERNAL MEDICINE | Facility: CLINIC | Age: 60
End: 2020-12-01

## 2020-12-01 DIAGNOSIS — S06.5XAA SDH (SUBDURAL HEMATOMA) (H): ICD-10-CM

## 2020-12-01 DIAGNOSIS — G93.5 BRAIN COMPRESSION (H): ICD-10-CM

## 2020-12-02 RX ORDER — LISINOPRIL 10 MG/1
20 TABLET ORAL DAILY
Qty: 180 TABLET | Refills: 3 | Status: SHIPPED | OUTPATIENT
Start: 2020-12-02

## 2020-12-06 ENCOUNTER — COMMUNICATION - HEALTHEAST (OUTPATIENT)
Dept: INTERNAL MEDICINE | Facility: CLINIC | Age: 60
End: 2020-12-06

## 2020-12-06 DIAGNOSIS — Z79.01 LONG TERM (CURRENT) USE OF ANTICOAGULANTS: ICD-10-CM

## 2020-12-06 DIAGNOSIS — I82.412 ACUTE DEEP VEIN THROMBOSIS OF LEFT FEMORAL VEIN (H): ICD-10-CM

## 2020-12-06 DIAGNOSIS — I51.3 APICAL MURAL THROMBUS: ICD-10-CM

## 2020-12-14 ENCOUNTER — COMMUNICATION - HEALTHEAST (OUTPATIENT)
Dept: ANTICOAGULATION | Facility: CLINIC | Age: 60
End: 2020-12-14

## 2020-12-14 ENCOUNTER — AMBULATORY - HEALTHEAST (OUTPATIENT)
Dept: LAB | Facility: CLINIC | Age: 60
End: 2020-12-14

## 2020-12-14 DIAGNOSIS — I51.3 APICAL MURAL THROMBUS: ICD-10-CM

## 2020-12-14 DIAGNOSIS — I82.412 ACUTE DEEP VEIN THROMBOSIS OF LEFT FEMORAL VEIN (H): ICD-10-CM

## 2020-12-14 DIAGNOSIS — I25.10 CAD, MULTIPLE VESSEL: ICD-10-CM

## 2020-12-14 LAB — INR PPP: 2.9 (ref 0.9–1.1)

## 2021-01-11 ENCOUNTER — COMMUNICATION - HEALTHEAST (OUTPATIENT)
Dept: ANTICOAGULATION | Facility: CLINIC | Age: 61
End: 2021-01-11

## 2021-01-11 ENCOUNTER — AMBULATORY - HEALTHEAST (OUTPATIENT)
Dept: LAB | Facility: CLINIC | Age: 61
End: 2021-01-11

## 2021-01-11 DIAGNOSIS — I82.412 ACUTE DEEP VEIN THROMBOSIS OF LEFT FEMORAL VEIN (H): ICD-10-CM

## 2021-01-11 DIAGNOSIS — I25.10 CAD, MULTIPLE VESSEL: ICD-10-CM

## 2021-01-11 DIAGNOSIS — I51.3 APICAL MURAL THROMBUS: ICD-10-CM

## 2021-01-11 LAB — INR PPP: 3 (ref 0.9–1.1)

## 2021-02-02 ENCOUNTER — COMMUNICATION - HEALTHEAST (OUTPATIENT)
Dept: INTERNAL MEDICINE | Facility: CLINIC | Age: 61
End: 2021-02-02

## 2021-02-02 DIAGNOSIS — I51.3 APICAL MURAL THROMBUS: ICD-10-CM

## 2021-02-02 DIAGNOSIS — Z79.01 LONG TERM (CURRENT) USE OF ANTICOAGULANTS: ICD-10-CM

## 2021-02-02 DIAGNOSIS — I82.412 ACUTE DEEP VEIN THROMBOSIS OF LEFT FEMORAL VEIN (H): ICD-10-CM

## 2021-02-11 ENCOUNTER — AMBULATORY - HEALTHEAST (OUTPATIENT)
Dept: LAB | Facility: CLINIC | Age: 61
End: 2021-02-11

## 2021-02-11 ENCOUNTER — COMMUNICATION - HEALTHEAST (OUTPATIENT)
Dept: ANTICOAGULATION | Facility: CLINIC | Age: 61
End: 2021-02-11

## 2021-02-11 DIAGNOSIS — I51.3 APICAL MURAL THROMBUS: ICD-10-CM

## 2021-02-11 DIAGNOSIS — I82.412 ACUTE DEEP VEIN THROMBOSIS OF LEFT FEMORAL VEIN (H): ICD-10-CM

## 2021-02-11 DIAGNOSIS — I25.10 CAD, MULTIPLE VESSEL: ICD-10-CM

## 2021-02-11 LAB — INR PPP: 3.3 (ref 0.9–1.1)

## 2021-03-10 ENCOUNTER — OFFICE VISIT - HEALTHEAST (OUTPATIENT)
Dept: INTERNAL MEDICINE | Facility: CLINIC | Age: 61
End: 2021-03-10

## 2021-03-10 DIAGNOSIS — N18.31 STAGE 3A CHRONIC KIDNEY DISEASE (H): ICD-10-CM

## 2021-03-10 DIAGNOSIS — I10 PRIMARY HYPERTENSION: ICD-10-CM

## 2021-03-10 DIAGNOSIS — Z79.01 WARFARIN ANTICOAGULATION: ICD-10-CM

## 2021-03-10 DIAGNOSIS — Z12.5 SCREENING FOR PROSTATE CANCER: ICD-10-CM

## 2021-03-10 DIAGNOSIS — I15.9 SECONDARY HYPERTENSION: ICD-10-CM

## 2021-03-10 LAB
ANION GAP SERPL CALCULATED.3IONS-SCNC: 9 MMOL/L (ref 5–18)
BUN SERPL-MCNC: 35 MG/DL (ref 8–22)
CALCIUM SERPL-MCNC: 9.7 MG/DL (ref 8.5–10.5)
CHLORIDE BLD-SCNC: 107 MMOL/L (ref 98–107)
CO2 SERPL-SCNC: 23 MMOL/L (ref 22–31)
CREAT SERPL-MCNC: 2.14 MG/DL (ref 0.7–1.3)
GFR SERPL CREATININE-BSD FRML MDRD: 31 ML/MIN/1.73M2
GLUCOSE BLD-MCNC: 58 MG/DL (ref 70–125)
POTASSIUM BLD-SCNC: 5.3 MMOL/L (ref 3.5–5)
PSA SERPL-MCNC: 0.9 NG/ML (ref 0–4.5)
SODIUM SERPL-SCNC: 139 MMOL/L (ref 136–145)

## 2021-03-10 RX ORDER — METOPROLOL SUCCINATE 200 MG/1
200 TABLET, EXTENDED RELEASE ORAL DAILY
Qty: 90 TABLET | Refills: 3 | Status: SHIPPED | OUTPATIENT
Start: 2021-03-10

## 2021-03-10 RX ORDER — ISOSORBIDE MONONITRATE 30 MG/1
30 TABLET, EXTENDED RELEASE ORAL DAILY
Qty: 90 TABLET | Refills: 3 | Status: SHIPPED | OUTPATIENT
Start: 2021-03-10

## 2021-03-10 ASSESSMENT — MIFFLIN-ST. JEOR: SCORE: 1822.31

## 2021-03-11 ENCOUNTER — COMMUNICATION - HEALTHEAST (OUTPATIENT)
Dept: INTERNAL MEDICINE | Facility: CLINIC | Age: 61
End: 2021-03-11

## 2021-03-11 ENCOUNTER — COMMUNICATION - HEALTHEAST (OUTPATIENT)
Dept: ANTICOAGULATION | Facility: CLINIC | Age: 61
End: 2021-03-11

## 2021-03-18 ENCOUNTER — AMBULATORY - HEALTHEAST (OUTPATIENT)
Dept: LAB | Facility: CLINIC | Age: 61
End: 2021-03-18

## 2021-03-18 ENCOUNTER — COMMUNICATION - HEALTHEAST (OUTPATIENT)
Dept: ANTICOAGULATION | Facility: CLINIC | Age: 61
End: 2021-03-18

## 2021-03-18 DIAGNOSIS — I82.412 ACUTE DEEP VEIN THROMBOSIS OF LEFT FEMORAL VEIN (H): ICD-10-CM

## 2021-03-18 DIAGNOSIS — I51.3 APICAL MURAL THROMBUS: ICD-10-CM

## 2021-03-18 DIAGNOSIS — I25.10 CAD, MULTIPLE VESSEL: ICD-10-CM

## 2021-03-18 LAB — INR PPP: 2.3 (ref 0.9–1.1)

## 2021-03-31 ENCOUNTER — AMBULATORY - HEALTHEAST (OUTPATIENT)
Dept: ANTICOAGULATION | Facility: CLINIC | Age: 61
End: 2021-03-31

## 2021-03-31 DIAGNOSIS — I82.412 ACUTE DEEP VEIN THROMBOSIS OF LEFT FEMORAL VEIN (H): ICD-10-CM

## 2021-03-31 DIAGNOSIS — I51.3 APICAL MURAL THROMBUS: ICD-10-CM

## 2021-04-12 ENCOUNTER — COMMUNICATION - HEALTHEAST (OUTPATIENT)
Dept: INTERNAL MEDICINE | Facility: CLINIC | Age: 61
End: 2021-04-12

## 2021-04-12 DIAGNOSIS — I82.412 ACUTE DEEP VEIN THROMBOSIS OF LEFT FEMORAL VEIN (H): ICD-10-CM

## 2021-04-12 DIAGNOSIS — Z79.01 LONG TERM (CURRENT) USE OF ANTICOAGULANTS: ICD-10-CM

## 2021-04-12 DIAGNOSIS — I51.3 APICAL MURAL THROMBUS: ICD-10-CM

## 2021-04-13 ENCOUNTER — RECORDS - HEALTHEAST (OUTPATIENT)
Dept: ADMINISTRATIVE | Facility: OTHER | Age: 61
End: 2021-04-13

## 2021-04-14 RX ORDER — WARFARIN SODIUM 5 MG/1
TABLET ORAL
Qty: 120 TABLET | Refills: 1 | Status: SHIPPED | OUTPATIENT
Start: 2021-04-14

## 2021-04-15 ENCOUNTER — COMMUNICATION - HEALTHEAST (OUTPATIENT)
Dept: ANTICOAGULATION | Facility: CLINIC | Age: 61
End: 2021-04-15

## 2021-04-15 ENCOUNTER — AMBULATORY - HEALTHEAST (OUTPATIENT)
Dept: LAB | Facility: CLINIC | Age: 61
End: 2021-04-15

## 2021-04-15 DIAGNOSIS — I82.412 ACUTE DEEP VEIN THROMBOSIS OF LEFT FEMORAL VEIN (H): ICD-10-CM

## 2021-04-15 DIAGNOSIS — I51.3 APICAL MURAL THROMBUS: ICD-10-CM

## 2021-04-15 DIAGNOSIS — I25.10 CAD, MULTIPLE VESSEL: ICD-10-CM

## 2021-04-15 LAB — INR PPP: 3.4 (ref 0.9–1.1)

## 2021-04-29 ENCOUNTER — AMBULATORY - HEALTHEAST (OUTPATIENT)
Dept: LAB | Facility: CLINIC | Age: 61
End: 2021-04-29

## 2021-04-29 ENCOUNTER — COMMUNICATION - HEALTHEAST (OUTPATIENT)
Dept: ANTICOAGULATION | Facility: CLINIC | Age: 61
End: 2021-04-29

## 2021-04-29 DIAGNOSIS — I51.3 APICAL MURAL THROMBUS: ICD-10-CM

## 2021-04-29 DIAGNOSIS — I25.10 CAD, MULTIPLE VESSEL: ICD-10-CM

## 2021-04-29 DIAGNOSIS — I82.412 ACUTE DEEP VEIN THROMBOSIS OF LEFT FEMORAL VEIN (H): ICD-10-CM

## 2021-04-29 LAB — INR PPP: 2.5 (ref 0.9–1.1)

## 2021-05-13 ENCOUNTER — AMBULATORY - HEALTHEAST (OUTPATIENT)
Dept: LAB | Facility: CLINIC | Age: 61
End: 2021-05-13

## 2021-05-13 ENCOUNTER — COMMUNICATION - HEALTHEAST (OUTPATIENT)
Dept: ANTICOAGULATION | Facility: CLINIC | Age: 61
End: 2021-05-13

## 2021-05-13 DIAGNOSIS — I25.10 CAD, MULTIPLE VESSEL: ICD-10-CM

## 2021-05-13 DIAGNOSIS — I51.3 APICAL MURAL THROMBUS: ICD-10-CM

## 2021-05-13 DIAGNOSIS — I82.412 ACUTE DEEP VEIN THROMBOSIS OF LEFT FEMORAL VEIN (H): ICD-10-CM

## 2021-05-13 LAB — INR PPP: 2.1 (ref 0.9–1.1)

## 2021-05-26 ENCOUNTER — RECORDS - HEALTHEAST (OUTPATIENT)
Dept: ADMINISTRATIVE | Facility: CLINIC | Age: 61
End: 2021-05-26

## 2021-05-26 VITALS — DIASTOLIC BLOOD PRESSURE: 78 MMHG | RESPIRATION RATE: 18 BRPM | HEART RATE: 68 BPM | SYSTOLIC BLOOD PRESSURE: 118 MMHG

## 2021-05-26 VITALS — SYSTOLIC BLOOD PRESSURE: 112 MMHG | DIASTOLIC BLOOD PRESSURE: 70 MMHG | HEART RATE: 68 BPM | RESPIRATION RATE: 18 BRPM

## 2021-05-27 NOTE — TELEPHONE ENCOUNTER
ANTICOAGULATION  MANAGEMENT    Assessment     Today's INR result of 3.4 is Supratherapeutic (goal INR of 2.0-3.0)        Warfarin taken as previously instructed    No new diet changes affecting INR    No new medication/supplements affecting INR    Continues to tolerate warfarin with no reported s/s of bleeding or thromboembolism     Previous INR was Therapeutic    Plan:     Spoke with Jurgen regarding INR result and instructed:     Warfarin Dosing Instructions:  skip today then continue current warfarin dose 5 mg daily on mon/wed/fri; and 7.5 mg daily rest of week  (0 % change)    Instructed patient to follow up no later than: two weeks    Parrish verbalizes understanding and agrees to warfarin dosing plan.    Instructed to call the [facility] for any changes, questions or concerns. (#[phone])   ?   Dakota Dowling RN    Subjective/Objective:      Jurgen Kirkland, a 58 y.o. male is on warfarin.     Jurgen reports:     Home warfarin dose: as updated on anticoagulation calendar per template     Missed doses: No     Medication changes:  No     S/S of bleeding or thromboembolism:  No     New Injury or illness:  No     Changes in diet or alcohol consumption:  No     Upcoming surgery, procedure or cardioversion:  No    Anticoagulation Episode Summary     Current INR goal:   2.0-3.0   TTR:   56.0 % (6.2 mo)   Next INR check:   4/22/2019   INR from last check:   3.40! (3/25/2019)   Weekly max warfarin dose:      Target end date:   Indefinite   INR check location:      Preferred lab:      Send INR reminders to:   ANTICOAGULATION POOL A (WBY,WBE,MID,RSC)    Indications    Apical mural thrombus [I51.3]  CAD  multiple vessel [I25.10]           Comments:            Anticoagulation Care Providers     Provider Role Specialty Phone number    Jasson Ponce MD Referring Internal Medicine [phone]

## 2021-05-27 NOTE — TELEPHONE ENCOUNTER
ANTICOAGULATION  MANAGEMENT    Assessment     Today's INR result of 4.2 is Supratherapeutic (goal INR of 2.0-3.0)        Warfarin taken as previously instructed    No new diet changes affecting INR    No new medication/supplements affecting INR    Continues to tolerate warfarin with no reported s/s of bleeding or thromboembolism     Previous INR was Supratherapeutic     Establishing with Rotilie 5/6    Plan:     Left a detailed message for Jurgen and talked with brother Gary regarding INR result and instructed:     Warfarin Dosing Instructions:  hold today then change warfarin dose to 7.5 mg daily on sun/tue; and 5 mg daily rest of week  (11.1 % change)    Instructed patient to follow up no later than: 7-10 days      Instructed to call the [facility] for any changes, questions or concerns. (#[phone])   ?   Dakota Dowling RN    Subjective/Objective:      Jurgen Kirkland, a 58 y.o. male is on warfarin.     Jurgen reports:     Home warfarin dose: as updated on anticoagulation calendar per template     Missed doses: No     Medication changes:  No     S/S of bleeding or thromboembolism:  No     New Injury or illness:  No     Changes in diet or alcohol consumption:  No     Upcoming surgery, procedure or cardioversion:  No    Anticoagulation Episode Summary     Current INR goal:   2.0-3.0   TTR:   52.1 % (6.7 mo)   Next INR check:   4/19/2019   INR from last check:   4.20! (4/8/2019)   Weekly max warfarin dose:      Target end date:   Indefinite   INR check location:      Preferred lab:      Send INR reminders to:   ANTICOAGULATION POOL A (WBY,WBE,MID,RSC)    Indications    Apical mural thrombus [I51.3]  CAD  multiple vessel [I25.10]           Comments:            Anticoagulation Care Providers     Provider Role Specialty Phone number    Jasson Ponce MD Referring Internal Medicine [phone]

## 2021-05-27 NOTE — TELEPHONE ENCOUNTER
Medication Question or Clarification  Who is calling: Patient  What medication are you calling about? (include dose and sig) Atorvastatin 40 mg  Who prescribed the medication?: Dr. Ponce  What is your question/concern?: The patient only has 6 days left of medication and goes thru [employer] prescription and he won't have enough medication until the next order comes thru and was wondering if he could get a weeks worth of medication until his medication comes in the mail.   Pharmacy: [facility] [place] off of [address] and [address]  Okay to leave a detailed message?: Yes  Site CMT - Please call the pharmacy to obtain any additional needed information.

## 2021-05-28 NOTE — TELEPHONE ENCOUNTER
Fax received from [facility], they have started the Prior Authorization Process via Cover My Meds    CoverMyMeds Key: [id number]    Medication Name:   atorvastatin (LIPITOR) 40 MG tablet 90 tablet 2 5/1/2019     Sig - Route: Take 1 tablet (40 mg total) by mouth daily. - Oral          Insurance Plan: [employer]  PBM:   Patient ID: not provided on fax    Please complete the PA process

## 2021-05-28 NOTE — TELEPHONE ENCOUNTER
ANTICOAGULATION  MANAGEMENT    Assessment     Today's INR result of 2.4 is Therapeutic (goal INR of 2.0-3.0)        Warfarin taken as previously instructed    No new diet changes affecting INR    No new medication/supplements affecting INR    Continues to tolerate warfarin with no reported s/s of bleeding or thromboembolism     Previous INR was Therapeutic    Plan:     Spoke with Jurgen regarding INR result and instructed:     Warfarin Dosing Instructions:  Continue current warfarin dose 7.5 mg daily on sun/tue; and 5 mg daily rest of week  (0 % change)    Instructed patient to follow up no later than: one month with ov      Parrish verbalizes understanding and agrees to warfarin dosing plan.    Instructed to call the [facility] for any changes, questions or concerns. (#[phone])   ?   Dakota Dowling RN    Subjective/Objective:      Jurgen Kirkland, a 58 y.o. male is on warfarin.     Jurgen reports:     Home warfarin dose: as updated on anticoagulation calendar per template     Missed doses: No     Medication changes:  No     S/S of bleeding or thromboembolism:  No     New Injury or illness:  No     Changes in diet or alcohol consumption:  No     Upcoming surgery, procedure or cardioversion:  No    Anticoagulation Episode Summary     Current INR goal:   2.0-3.0   TTR:   53.9 % (7.6 mo)   Next INR check:   6/4/2019   INR from last check:      Weekly max warfarin dose:      Target end date:   Indefinite   INR check location:      Preferred lab:      Send INR reminders to:   ANTICOAGULATION POOL A (WBY,WBE,MID,RSC)    Indications    Apical mural thrombus [I51.3]  CAD  multiple vessel [I25.10]           Comments:            Anticoagulation Care Providers     Provider Role Specialty Phone number    Jasson Ponce MD Referring Internal Medicine [phone]

## 2021-05-28 NOTE — TELEPHONE ENCOUNTER
Refill Request  Did you contact pharmacy: Yes  Medication name: atorvastatin (LIPITOR) 40 MG tablet  Requested Prescriptions      No prescriptions requested or ordered in this encounter     Who prescribed the medication: Dr. Ponce  Pharmacy Name and Location: [facility] [address]  Is patient out of medication: Yes, the pharmacy wouldn't refill and told him to see the doctor, he did have an establish care appointment already scheduled, but I made an ov for Wednesday for a med check only.    Patient notified refills processed in 72 hours:  yes  Okay to leave a detailed message: yes

## 2021-05-28 NOTE — TELEPHONE ENCOUNTER
ANTICOAGULATION  MANAGEMENT    Assessment     Today's INR result of 2.4 is Therapeutic (goal INR of 2.0-3.0)        Warfarin taken as previously instructed    No new diet changes affecting INR    No new medication/supplements affecting INR    Continues to tolerate warfarin with no reported s/s of bleeding or thromboembolism     Previous INR was Supratherapeutic    Plan:     Spoke with Jurgen regarding INR result and instructed:     Warfarin Dosing Instructions:  Continue current warfarin dose 7.5 mg daily on sun/tue; and 5 mg daily rest of week     Instructed patient to follow up no later than: two weeks      Parrish verbalizes understanding and agrees to warfarin dosing plan.    Instructed to call the [facility] for any changes, questions or concerns. (#[phone])   ?   Dakota Dowling RN    Subjective/Objective:      Jurgen Kirkland, a 58 y.o. male is on warfarin.     Jurgen reports:     Home warfarin dose: as updated on anticoagulation calendar per template     Missed doses: No     Medication changes:  No     S/S of bleeding or thromboembolism:  No     New Injury or illness:  No     Changes in diet or alcohol consumption:  No     Upcoming surgery, procedure or cardioversion:  No    Anticoagulation Episode Summary     Current INR goal:   2.0-3.0   TTR:   50.9 % (7.1 mo)   Next INR check:   5/6/2019   INR from last check:   2.40 (4/22/2019)   Weekly max warfarin dose:      Target end date:   Indefinite   INR check location:      Preferred lab:      Send INR reminders to:   ANTICOAGULATION POOL A (WBY,WBE,MID,RSC)    Indications    Apical mural thrombus [I51.3]  CAD  multiple vessel [I25.10]           Comments:            Anticoagulation Care Providers     Provider Role Specialty Phone number    Jasson Ponce MD Referring Internal Medicine [phone]

## 2021-05-28 NOTE — PATIENT INSTRUCTIONS - HE
1. [facility]illed [facility].     2. Follow up as arranged in June - 1 month.    3. Follow up with cardiology as scheduled.    4. No change in medications.

## 2021-05-28 NOTE — PROGRESS NOTES
" ASSESSMENT AND PLAN:    1. Hyperlipemia  Refilled ongoing treatment.   - atorvastatin (LIPITOR) 40 MG tablet; Take 1 tablet (40 mg total) by mouth daily.  Dispense: 30 tablet; Refill: 3    2. CAD  S/p [facility] 9/18 [facility], no angina, or heart failure symptoms.  On metoprolol.     3. Warfarin anticoagulation  Chronic with INR monitoring for atrial mural thrombus.      Patient Instructions   1. Refilled his atorvastatin to [facility].     2. Follow up as arranged in June - 1 month.    3. Follow up with cardiology as scheduled.    4. No change in medications.     CHIEF COMPLAINT:  Chief Complaint   Patient presents with     Medication Management     Establish Care     HISTORY OF PRESENT ILLNESS:  Jurgen Kirkland is a 58 y.o. male with follow up to establish care.  Doing well, without angina, or increased GASCA, or orthopnea.  No unusual cough, or fever.  Medications are not causing significant side effects.  No symptoms of bleeding on warfarin.     REVIEW OF SYSTEMS:   See HPI, all other systems on review are negative.    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    VITALS:  Vitals:    05/08/19 1351   BP: 120/80   Patient Site: Left Arm   Patient Position: Sitting   Cuff Size: Adult Large   Pulse: (!) 54   SpO2: 96%   Weight: 218 lb 8 oz (99.1 kg)   Height: 5' 10\" (1.778 m)     Wt Readings from Last 3 " Encounters:   05/08/19 218 lb 8 oz (99.1 kg)   03/01/19 212 lb (96.2 kg)   10/22/18 215 lb 8 oz (97.8 kg)     PHYSICAL EXAM:  Constitutional:  In NAD, alert and oriented  Cardiac:  S1 S2 without murmur  Lungs: Clear to auscultation  Abdomen:   Soft, flat and non-tender, without guarding, rebound, or mass.    Extremities: minor edema, no inflammation    DECISION TO OBTAIN OLD RECORDS AND/OR OBTAIN HISTORY FROM SOMEONE OTHER THAN PATIENT, AND/OR ACCESSING CARE EVERYWHERE):  1  0     REVIEW AND SUMMARIZATION OF OLD RECORDS, AND/OR OBTAINING HISTORY FROM SOMEONE OTHER THAN PATIENT, AND/OR DISCUSSION OF CASE WITH ANOTHER HEALTH CARE PROVIDER:  2 reviewed most recent evaluation    REVIEW AND/OR ORDER OF OF CLINICAL LAB TESTS: 1  0.    REVIEW AND/OR ORDER OF RADIOLOGY TESTS: 1 0.    REVIEW AND/OR ORDER OF MEDICAL TESTS (EKG/ECHO/COLONOSCOPY/EGD): 1  0    INDEPENDENT  VISUALIZATION OF IMAGE, TRACING, OR SPECIMEN ITSELF (2 EACH):  0     TOTAL: 2    Current Outpatient Medications   Medication Sig Dispense Refill     aspirin 81 MG EC tablet Take 81 mg by mouth daily.       atenolol (TENORMIN) 50 MG tablet Take 1 tablet (50 mg total) by mouth daily. 90 tablet 3     atorvastatin (LIPITOR) 40 MG tablet Take 1 tablet (40 mg total) by mouth daily. 30 tablet 3     cholecalciferol, vitamin D3, 1,000 unit tablet Take 1,000 Units by mouth daily.       lamoTRIgine (LAMICTAL) 200 MG tablet tAKE ONE TABLET IN THE MORNING, 1/2 TAB AT LUNCH AND 1 TABLET AT HS       levETIRAcetam (KEPPRA XR) 500 mg 24 hr tablet Take 500 mg by mouth. Take three tablets TID       metoprolol succinate (TOPROL XL) 100 MG 24 hr tablet Take 1 tablet (100 mg total) by mouth daily. 30 tablet 11     SENNA-S 8.6-50 mg tablet        warfarin (COUMADIN/JANTOVEN) 5 MG tablet Take 1-1 1/2 tablets (5-7.5 mg) daily as directed. Adjust dose per INR results. 90 tablet 1     No current facility-administered medications for this visit.      Jasson Abdul MD  Internal  Medicine  [facility]

## 2021-05-28 NOTE — TELEPHONE ENCOUNTER
Central PA team  [phone]  Pool: HE PA MED ([id number])          PA has been initiated.       PA form completed and faxed insurance via Cover My Meds     Key:  [id number]     Medication:  ATORVASTATIN 40MG    Insurance:  [employer]        Response will be received via fax and may take up to 5-10 business days depending on plan

## 2021-05-28 NOTE — TELEPHONE ENCOUNTER
Former patient of Kris & has not established care with another provider.  Please assign refill request to covering provider per Clinic standard process.      Refill Approved    Rx renewed per Medication Renewal Policy. Medication was last renewed on 4/2/19.    Gabrielle Arias, Care Connection Triage/Med Refill 5/1/2019     Requested Prescriptions   Pending Prescriptions Disp Refills     atorvastatin (LIPITOR) 40 MG tablet 90 tablet 0     Sig: Take 1 tablet (40 mg total) by mouth daily.       Statins Refill Protocol (Hmg CoA Reductase Inhibitors) Passed - 4/30/2019  8:35 AM        Passed - PCP or prescribing provider visit in past 12 months      Last office visit with prescriber/PCP: 3/1/2019 Jasson Ponce MD OR same dept: 3/1/2019 Jasson Ponce MD OR same specialty: 3/1/2019 Jasson Ponce MD  Last physical: Visit date not found Last MTM visit: Visit date not found   Next visit within 3 mo: Visit date not found  Next physical within 3 mo: Visit date not found  Prescriber OR PCP: Jasson Ponce MD  Last diagnosis associated with med order: 1. Hyperlipemia  - atorvastatin (LIPITOR) 40 MG tablet; Take 1 tablet (40 mg total) by mouth daily.  Dispense: 90 tablet; Refill: 0    If protocol passes may refill for 12 months if within 3 months of last provider visit (or a total of 15 months).

## 2021-05-29 NOTE — TELEPHONE ENCOUNTER
"Anticoagulation Annual Referral Renewal Review    Jurgen Kirkland's chart reviewed for annual renewal of referral to anticoagulation monitoring.        Criteria for anticoagulation nurse and/or pharmacist renewal met   Warfarin indication: apical mural thrombus Yes , DVT/PE with previous provider documentation patient to be on extended anticoagulation 5/8/19   Current with INR monitoring/compliant Yes Yes   Date of last office visit 5/8/19 Yes, had office visit within last year   Time in Therapeutic Range (TTR) 51.35 % No, TTR < 60 %       Jurgen Kirkland did NOT meet all criteria for anticoagulation management program initiated renewal and requires provider review. Using dot phrase, \".acmrenewalprovider\", please advise if Jurgen's anticoagulation management referral should be renewed or if patient should be seen in office to review anticoagulation therapy      Dakota Dowling RN  5:08 PM      "

## 2021-05-29 NOTE — TELEPHONE ENCOUNTER
Medication Question or Clarification  Who is calling: Patient  What medication are you calling about? (include dose and sig)   atenolol (TENORMIN) 50 MG tablet 90 tablet 3 6/4/2019     Sig - Route: Take 1 tablet (50 mg total) by mouth daily. - Oral    Sent to pharmacy as: atenolol (TENORMIN) 50 MG tablet        Who prescribed the medication?: Jasson Abdul MD  What is your question/concern?: Patient stated I seen the heart doctor yesterday and I was advised to request the atenolol to be removed from my medication list as I am no longer taking this medication and I am taking the metoprolol.  Please let the [employer] know this as well at [phone]  Pharmacy: [employer]  Okay to leave a detailed message?: Yes  Site CMT - Please call the pharmacy to obtain any additional needed information.

## 2021-05-29 NOTE — TELEPHONE ENCOUNTER
Who is calling:  [facility]  Reason for Call:  They were calling to check the status of the below listed medication question. Writer informed them that they may remove the atenolol and the patient is now taking metoprolol 100 mg daily.  Date of last appointment with primary care: 6/04/2019  Okay to leave a detailed message: No

## 2021-05-29 NOTE — PATIENT INSTRUCTIONS - HE
1. INR and other lab testing today.    2. No changes in medications.     3. Follow up in 6 months.

## 2021-05-29 NOTE — PROGRESS NOTES
ASSESSMENT AND PLAN:    1. Hyperlipemia  On treatment.  Will assess control. No fasting.   - atorvastatin (LIPITOR) 40 MG tablet; Take 1 tablet (40 mg total) by mouth daily.  Dispense: 90 tablet; Refill: 3  - Comprehensive Metabolic Panel  - HM1(CBC and Differential)  - LDL Cholesterol, Direct    2. Benign essential HTN  Controlled.  He does take both beta blockers and has for sometime.  I offered simplification, he declines as it has gone well.    - metoprolol succinate (TOPROL XL) 100 MG 24 hr tablet; Take 1 tablet (100 mg total) by mouth daily.  Dispense: 90 tablet; Refill: 3  - atenolol (TENORMIN) 50 MG tablet; Take 1 tablet (50 mg total) by mouth daily.  Dispense: 90 tablet; Refill: 3    3. Chronic Kidney Disease, Stage 3  Creatinine 1.8 most recently, stable the past few years.     4. Warfarin anticoagulation  Lifelong for history of mural thrombus, no bleeding, ongoing INR monitoring.     6. Screening for prostate cancer  2018 PSA 0.8.  - PSA, Annual Screen (Prostatic-Specific Antigen)    We reviewed his medications.     Patient Instructions   1. INR and other lab testing today.    2. No changes in medications.     3. Follow up in 6 months.     CHIEF COMPLAINT:  Chief Complaint   Patient presents with     Establish Care     Do necessary blood and INR     HISTORY OF PRESENT ILLNESS:  Jurgen Kirkland is a 58 y.o. male here to establish care.  Doing well.  No symptoms of bleeding, or unusual dyspnea, or chest pain.  No nausea or change in bowel or bladder symptoms. No headache or arm or leg numbness or weakness. No seizures.    REVIEW OF SYSTEMS:   See HPI, all other systems on review are negative.    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      CRANIOTOMY N/A 1960    infant with TBI/neurosurgical intervention     VITALS:  Vitals:    06/04/19 1034   BP: 130/68   Patient Site: Left Arm   Patient Position: Sitting   Cuff Size: Adult Regular   Pulse: 60   SpO2: 97%   Weight: 214 lb (97.1 kg)   Height: 5' 10\" (1.778 m)     Wt Readings from Last 3 Encounters:   06/04/19 214 lb (97.1 kg)   05/08/19 218 lb 8 oz (99.1 kg)   03/01/19 212 lb (96.2 kg)     PHYSICAL EXAM:  Constitutional:  In NAD, alert and oriented  Cardiac:  S1 S2   Lungs: Clear to auscultation  Abdomen:   Soft, flat and non-tender      DECISION TO OBTAIN OLD RECORDS AND/OR OBTAIN HISTORY FROM SOMEONE OTHER THAN PATIENT, AND/OR ACCESSING CARE EVERYWHERE):  1  0     REVIEW AND SUMMARIZATION OF OLD RECORDS, AND/OR OBTAINING HISTORY FROM SOMEONE OTHER THAN PATIENT, AND/OR DISCUSSION OF CASE WITH ANOTHER HEALTH CARE PROVIDER:  2 reviewed most recent past evaluation    REVIEW AND/OR ORDER OF OF CLINICAL LAB TESTS: 1  Reviewed creatinine results    REVIEW AND/OR ORDER OF RADIOLOGY TESTS: 1 0.    REVIEW AND/OR ORDER OF MEDICAL TESTS (EKG/ECHO/COLONOSCOPY/EGD): 1  0    INDEPENDENT  VISUALIZATION OF IMAGE, TRACING, OR SPECIMEN ITSELF (2 EACH):  0    TOTAL: 3    Current Outpatient Medications   Medication Sig Dispense Refill     aspirin 81 MG EC tablet Take 81 mg by mouth daily.       atenolol (TENORMIN) 50 MG tablet Take 1 tablet (50 mg total) by mouth daily. 90 tablet 3     atorvastatin (LIPITOR) 40 MG tablet Take 1 tablet (40 mg total) by mouth daily. 90 tablet 3     cholecalciferol, vitamin D3, 1,000 unit tablet Take 1,000 Units by mouth daily.       lamoTRIgine (LAMICTAL) 200 MG tablet tAKE ONE TABLET IN THE " MORNING, 1/2 TAB AT LUNCH AND 1 TABLET AT HS       levETIRAcetam (KEPPRA XR) 500 mg 24 hr tablet Take 500 mg by mouth. Take three tablets TID       metoprolol succinate (TOPROL XL) 100 MG 24 hr tablet Take 1 tablet (100 mg total) by mouth daily. 90 tablet 3     SENNA-S 8.6-50 mg tablet        warfarin (COUMADIN/JANTOVEN) 5 MG tablet Take 1-1 1/2 tablets (5-7.5 mg) daily as directed. Adjust dose per INR results. 90 tablet 1     No current facility-administered medications for this visit.      Jasson Abdul MD  Internal Medicine  [facility]

## 2021-05-30 VITALS — HEIGHT: 70 IN | WEIGHT: 216.2 LBS | BODY MASS INDEX: 30.95 KG/M2

## 2021-05-30 NOTE — TELEPHONE ENCOUNTER
----- Message from Flaca Curry MD sent at 7/17/2019 12:15 PM CDT -----  Patient has a subdural hematoma probably chronic causing symptoms.  I did tell him to stop his Coumadin and I think he needs to be discontinued altogether and have referred him to cardiology to evaluate that.   currently should not be taking it at least for the next 7 days unless approved by cardiology to restart.

## 2021-05-30 NOTE — TELEPHONE ENCOUNTER
NEUROSURGERY:    Patient's case was reviewed with his cardiology team, PA remarked to Tyra who conferred with Dr. Price.    He has an appointment with them on Tuesday, July 23.    Per cardiology at [facility] it is okay for patient to be off of his Coumadin at this time.  The last echocardiogram completed in December did still reveal a thrombus, they will obtain a repeat echo.  Even if thrombus still present they would still endorse holding the Coumadin given the likely spontaneous subdural hematoma.  Typically patients are kept on warfarin for 1 year, this would be discontinued September 3, 2019.    They are aware of his current neurosurgical issue.  It is acceptable for patient to be off of the Coumadin for 10 to 14 days at this time.    Manisha Donis -[facility]  [address], [address]  [place] [address]    O: [phone]  P: [phone]

## 2021-05-30 NOTE — PROGRESS NOTES
" ASSESSMENT AND PLAN:    1. Sebaceous cyst  Right forearm, no evidence of bit, or infection, reassured, likely will resolve. Be seen if becomes inflamed or infected.     Patient Instructions   1. Reassured about sebaceous cyst. Repeat /80.     2. Follow up as previously arranged.      CHIEF COMPLAINT:  Chief Complaint   Patient presents with     Insect Bite     right arm; painful; raised; onset 10 days ago     HISTORY OF PRESENT ILLNESS:  Jurgen Kirkland is a 59 y.o. male with concern about bump on right forearm.  He thinks it may have been a bug bite. No rash has occurred.  Feels well otherwise.     REVIEW OF SYSTEMS:   See HPI, all other systems on review are negative.    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      CRANIOTOMY N/A 1960    infant with TBI/neurosurgical intervention     VITALS:  Vitals:    07/01/19 1535 07/01/19 1537   BP: 150/80 150/80   Patient Site: Left Arm Left Arm   Patient Position: Sitting Sitting   Cuff Size: Adult Regular Adult Regular   Pulse: (!) 56    Temp: 98.5  F (36.9  C)    TempSrc: Tympanic    SpO2: 95%    Weight: 218 lb 11.2 oz (99.2 kg)    Height: 5' 10\" (1.778 m)      Wt Readings from Last 3 Encounters:   07/01/19 218 lb 11.2 oz (99.2 kg)   06/04/19 214 lb (97.1 kg)   05/08/19 218 lb 8 oz (99.1 kg)     PHYSICAL EXAM:  Constitutional:  In NAD, alert and oriented  Extremities: soft, cystic moveable .5 cm nodule over " lower medial forearm, no erythema, or drainage, or bite wound is noted.     Current Outpatient Medications   Medication Sig Dispense Refill     aspirin 81 MG EC tablet Take 81 mg by mouth daily.       atorvastatin (LIPITOR) 40 MG tablet Take 1 tablet (40 mg total) by mouth daily. 90 tablet 3     cholecalciferol, vitamin D3, 1,000 unit tablet Take 1,000 Units by mouth daily.       lamoTRIgine (LAMICTAL) 200 MG tablet tAKE ONE TABLET IN THE MORNING, 1/2 TAB AT LUNCH AND 1 TABLET AT HS       levETIRAcetam (KEPPRA XR) 500 mg 24 hr tablet Take 500 mg by mouth. Take three tablets TID       metoprolol succinate (TOPROL XL) 100 MG 24 hr tablet Take 1 tablet (100 mg total) by mouth daily. 90 tablet 3     SENNA-S 8.6-50 mg tablet        warfarin (COUMADIN/JANTOVEN) 5 MG tablet TAKE 1 TO 1 1/2 TABLETS DAILY AS DIRECTED. ADJUST DOSE PER INR RESULTS. 135 tablet 1          Jasson Abdul MD  Internal Medicine  [facility]

## 2021-05-30 NOTE — PROGRESS NOTES
Office Visit - Follow Up   Jurgen Kirkland   59 y.o. male    Date of Visit: 7/15/2019         Assessment and Plan   1. New onset headache  New onset headache in a patient over the age of 55 with no prior history of migraine or tension vascular headaches requires further investigation and imaging.  It also is quite a significant headache which has woken him up at night.  He has no neurological sequelae that I can see or history of trauma, therefore I do not think urgent CT scan is necessary would rather have an MRI done as soon as possible to get a more detailed look at the brain structure.  Patient was instructed to go to the emergency room if he feels drowsy or headaches becomes unbearable he strong.  - Erythrocyte Sedimentation Rate  - C-Reactive Protein(CRP)  - HM2(CBC w/o Differential)  - Basic Metabolic Panel  - HYDROcodone-acetaminophen 5-325 mg per tablet; Take 1 tablet by mouth every 4 (four) hours as needed for pain.  Dispense: 18 tablet; Refill: 0            Return in about 1 week (around 7/22/2019) for symptoms that worsen or do not improve.     History of Present Illness   This 59 y.o. old   Chief Complaint   Patient presents with     Ear Pain     Pain in front of right ear, pain and throbbing, swimming on 7-6 and thats when first started   Pleasant patient with a history of grand mal seizures well-controlled on Keppra and Lamictal, noticed right-sided ear pain after he was swimming and a sensation of a blocked ear.  This did improve in the next few days but was followed by a severe pounding headache that he felt mostly in the right temple and cheek area but also is felt in the left side.  The headache was moderately severe enough to disturb his sleep but would lukas when he took some hydrocodone that was left over from his surgery.  Ibuprofen and did not help him.  There were no visual signs, no change in his weakness or balance.  He has no history of a fall.  He is quite sure he has not had a  "seizure in the last 10 years.  Of note he is on disability due to TBI and related cognitive dysfunction and grand mal seizure history.  He has no prior history of tension vascular headaches or migraines or headaches at all.  He is on Coumadin because he had an apical mural thrombus noted in October he is also had multiple vessel bypass.  Review of Systems: A comprehensive review of systems was negative except as noted.     Medications, Allergies and Problem List   Reviewed, reconciled and updated  Patient Active Problem List   Diagnosis     Cognitive Functions Current Level Impaired     Epilepsy And Recurrent Seizures     Benign essential hypertension     CKD (chronic kidney disease) stage 3, GFR 30-59 ml/min (H)     Hyperlipemia     Traumatic Brain Injury     CAD, multiple vessel     Apical mural thrombus     S/P CABG (coronary artery bypass graft)     Warfarin anticoagulation     Sebaceous cyst        Physical Exam   General Appearance:       BP (!) 150/96 (Patient Site: Left Arm, Patient Position: Sitting, Cuff Size: Adult Large)   Pulse 68   Ht 5' 10\" (1.778 m)   Wt 220 lb (99.8 kg)   SpO2 97%   BMI 31.57 kg/m      General appearance - alert, well appearing, and in no distress  Mental status - alert, oriented to person, place, and time  Neck - supple, no significant adenopathy  Lymphatics - no palpable lymphadenopathy, no hepatosplenomegaly  Chest - clear to auscultation, no wheezes, rales or rhonchi, symmetric air entry  Heart - normal rate, regular rhythm, normal S1, S2, no murmurs, rubs, clicks or gallops  Abdomen - soft, nontender, nondistended, no masses or organomegaly  Extremities - peripheral pulses normal, no pedal edema, no clubbing or cyanosis  Skin - normal coloration and turgor, no rashes, no suspicious skin lesions noted  Neurological exam: Gait is normal Romberg's is negative there is no past pointing or tremor.  Tone is normal and symmetrical there is no focal weakness in the upper or lower " extremities reflexes are symmetrical cranial nerves are all normal.  There is no dysdiadochokinesis he is alert and fully oriented.  Funduscope exam full retina not visualized but optic disks appear clear    Ears right ear is is impacted has some cerumen impaction and eardrum is not visualized left ear shows normal tympanic membrane with no cerumen                                                                                 Additional Information   Current Outpatient Medications   Medication Sig Dispense Refill     aspirin 81 MG EC tablet Take 81 mg by mouth daily.       atorvastatin (LIPITOR) 40 MG tablet Take 1 tablet (40 mg total) by mouth daily. 90 tablet 3     cholecalciferol, vitamin D3, 1,000 unit tablet Take 1,000 Units by mouth daily.       lamoTRIgine (LAMICTAL) 200 MG tablet tAKE ONE TABLET IN THE MORNING, 1/2 TAB AT LUNCH AND 1 TABLET AT HS       levETIRAcetam (KEPPRA XR) 500 mg 24 hr tablet Take 500 mg by mouth. Take three tablets TID       metoprolol succinate (TOPROL XL) 100 MG 24 hr tablet Take 1 tablet (100 mg total) by mouth daily. 90 tablet 3     SENNA-S 8.6-50 mg tablet        warfarin (COUMADIN/JANTOVEN) 5 MG tablet TAKE 1 TO 1 1/2 TABLETS DAILY AS DIRECTED. ADJUST DOSE PER INR RESULTS. 135 tablet 1     HYDROcodone-acetaminophen 5-325 mg per tablet Take 1 tablet by mouth every 4 (four) hours as needed for pain. 18 tablet 0     No current facility-administered medications for this visit.      Allergies   Allergen Reactions     Nsaids (Non-Steroidal Anti-Inflammatory Drug) Nephrotoxicity     Social History     Social History Narrative    Single has had girlfriend (RN)-Renée past several years. TBI as infant-fell down steps. Cognitive problems and seizures but finished high school. Refractory seizure disorder goes to [facility]. Recent better control post [facility]/disability. Lives with brother (Gary)-primary contact, remote etoh problems; non smoker. No ETOH now. 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 He has never used smokeless tobacco. He reports that he does not drink alcohol. His drug history is not on file.   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

## 2021-05-30 NOTE — TELEPHONE ENCOUNTER
Patient Returning Call  Reason for call:  Returning phone call  Information relayed to patient:  Below message relayed to patient.  Patient has additional questions:  No  If YES, what are your questions/concerns:  No additional questions at this time.  Okay to leave a detailed message?: No call back needed

## 2021-05-30 NOTE — TELEPHONE ENCOUNTER
RN cannot approve Refill Request    RN can NOT refill this medication med is not covered by policy/route to provider.      Gabrielle Arias, [facility] Triage/Med Refill 6/25/2019    Requested Prescriptions   Pending Prescriptions Disp Refills     warfarin (COUMADIN/JANTOVEN) 5 MG tablet [Pharmacy Med Name: WARFARIN SODIUM 5 MG Tablet] 135 tablet 1     Sig: TAKE 1 TO 1 1/2 TABLETS DAILY AS DIRECTED. ADJUST DOSE PER INR RESULTS.       Warfarin Refill Protocol  Failed - 6/24/2019  7:24 PM        Failed -  Route to appropriate pool/provider     Last Anticoagulation Summary:   Anticoagulation Episode Summary     Current INR goal:   2.0-3.0   TTR:   58.4 % (9 mo)   Next INR check:   7/1/2019   INR from last check:   2.90 (6/17/2019)   Weekly max warfarin dose:      Target end date:   Indefinite   INR check location:      Preferred lab:      Send INR reminders to:   [facility]    Indications    Apical mural thrombus [I51.3]  CAD  multiple vessel [I25.10]           Comments:            Anticoagulation Care Providers     Provider Role Specialty Phone number    Jasson Ponce MD Referring Internal Medicine [phone]                Passed - Provider visit in last year     Last office visit with prescriber/PCP: 3/1/2019 Jasson Ponce MD OR same dept: 6/4/2019 Jasson Abdul MD OR same specialty: 6/4/2019 Jasson Abdul MD  Last physical: Visit date not found Last MTM visit: Visit date not found    Next appt within 3 mo: Visit date not found Next physical within 3 mo: Visit date not found  Prescriber OR PCP: Jasson Ponce MD  Last diagnosis associated with med order: 1. Warfarin anticoagulation  - warfarin (COUMADIN/JANTOVEN) 5 MG tablet [Pharmacy Med Name: WARFARIN SODIUM 5 MG Tablet]; TAKE 1 TO 1 1/2 TABLETS DAILY AS DIRECTED. ADJUST DOSE PER INR RESULTS.  Dispense: 135 tablet; Refill: 1    If protocol passes may refill for 6 months if within 3 months of last provider visit (or a total of 9  months).

## 2021-05-30 NOTE — TELEPHONE ENCOUNTER
The patient just called and has a few questions about stopping his warfarin.  The patient would like to speak with an INR nurse.

## 2021-05-30 NOTE — PATIENT INSTRUCTIONS - HE
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 It is ~10mm in size (1cm) which is moderate (not small, not very large), but it is causing some pressure on the right frontal lobe of the brain.  Plan:  1. Obtain a repeat head CT on Monday July 22nd, 2019 at [facility] at 9:05AM  2. On Monday, after your head CT, go to the outpatient lab at [facility] to have your blood drawn- we will be checking your INR and other labs to make sure your blood is not too thin (from medication or otherwise)  3. STOP taking your aspirin 81mg today  4. Continue to HOLD your coumadin/warfarin

## 2021-05-30 NOTE — PROGRESS NOTES
"Neurosurgery consultation was requested by: Dr. Curry for evaluation of SDH  Patient presents with a chief complaint of right frontal not positional HA. It has started on 7/14/2019 while swimming in his son's pool. Descrubes a \"throubbing\" almost persistent ache. Denies visual disturbance or hearing loss. Denies nausea/vomiting. Denies sensory or motor issues. Gait and balance are normal. Speech is fluent. Cognition is intact.  Of note, stopped Coumadin for MI last September.  Nalini Whittington RN, CNRN    "

## 2021-05-30 NOTE — TELEPHONE ENCOUNTER
Wants appt with pcp.  Wake in middle of night with throbbing head 2:30am x 5 days.    Was swimming in pool and got headache the week since then.    Happening a lot past few nights.  Taking tylenol 2 every 6 hours.    Not a HA now but is sore above his right ear.    Needs to be evaluated today.    Hallie Mo, RN, [facility] Nurse Triage/Med Refills RN       Reason for Disposition    Earache lasts > 1 hour    Protocols used: EAR - CONGESTION-A-OH

## 2021-05-30 NOTE — TELEPHONE ENCOUNTER
NEUROSURGERY:    Was contacted by Dr. Curry notifying our service of 60 yo male without prior hx of headache who was evaluated in her clinic for headache. He is on coumadin for an apical thrombus after an MI in September. MRI was obtained and reveals a chronic right frontal SDH, no significant mass effect. The patient is reportedly intact, but has a headache which he reported to Dr. Curry as significantly improved today.    Would recommend having the patient see us before the end of the week, ideally on surgeon schedule but with first available as new consult. Will obtain head CT wo contrast to better determine chronicity. Agree with Dr. Curry about holding the coumadin, no thrombus on previous echo.    I informed Dr. Curry that  I was happy to provide my informal thoughts but since I'm unable to evaluate the patient any actual decision or decisions (such as admission, discharge, etc.) about next steps in the patient's care rest with you, the provider.  I can give you some general thoughts, but a formal consult is necessary for me to provide an active role in the patient's care.    Manisha Donis -[facility]  [address] [place] [address], [address]  [place] [address]    O: [phone]  P: [phone]

## 2021-05-30 NOTE — TELEPHONE ENCOUNTER
Called pt told him that he has a 9 mm subdural hematoma no midline shift but given the new onset headache and no prior history the likelihood that the headache is due to the hematoma is very high.  According I did speak to cardiology on-call at [facility] where he has been seen for his post bypass care.  They we mutually agreed to stop the Coumadin because there was no end date specified it is been more than 6 months out and from and the last echo did not appear to show a mural thrombus.  I did refer him back so that if they do want to reecho him and to make sure they they can do so.  I also called neurosurgery to have him facilitated for an urgent appointment.  I do not think he needs to be admitted to the hospital given that the hematoma is probably chronic and also he is currently feeling better and neurologically intact.  He needs to see urgently for possible serial CT scans.  Patient agreed with plan and will stop the Coumadin he was told to go to the ER if the headache becomes more severe if he becomes drowsy or any new neurological symptoms develop.

## 2021-05-30 NOTE — TELEPHONE ENCOUNTER
----- Message from Flaca Curry MD sent at 7/16/2019 12:40 PM CDT -----  Please tell pt that his blood tests were normal and kidney test was slightly elevated but stable and better than before

## 2021-05-30 NOTE — TELEPHONE ENCOUNTER
Left message for Parrish and spoke with brother Gary regarding Dr Curry's advice below. Will hold warfarin and await advice from cardiology and neuro on whether to resume.    Did speak with Parrish, we discussed rationale for holding warfarin which he will do

## 2021-05-30 NOTE — TELEPHONE ENCOUNTER
[phone] (home)      [facility] called and LMTCB.  Should pt call please inform of results below. Sabiha Welch CMA ([facility]) 1:17 PM

## 2021-05-30 NOTE — PROGRESS NOTES
NEUROSURGERY CONSULTATION NOTE    7/19/2019      CHIEF COMPLAINT: Headache, subdural hematoma     HPI:    Jurgen Kirkland is a 59 y.o. male with a past medical history significant for myocardial infarction, CABG who has a known cardiac mural thrombus and has been on chronic Coumadin therapy who is sent to us in consultation by Flaca Curry for further evaluation of subacute subdural hematoma.    Mr. Kirkland notes that on July 6, 2019 he was swimming with his nieces and nephews, when he went underwater he felt a pain and rush of water into his ear followed by pain that spread up into the bilateral temporal region.  He notes he had a severe headache that day that lasted for several days.  He notes that since then, his headache has become more mild and is now isolated to the right side of his head.  He denies any other new symptoms.  Denies changes in his vision, numbness, tingling, weakness.  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  His last aura was in the fall 2018 around the time he had his heart attack.    For evaluation of his headaches, he was seen by a physician in his primary care doctor's office who ordered a brain MRI.  Imaging revealed a subacute subdural hematoma over the right frontal convexity.  His Coumadin was discontinued at that point in time.  He continues on a baby aspirin.    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      CRANIOTOMY N/A 1960    infant with TBI/neurosurgical intervention         REVIEW OF SYSTEMS:  Pt denies changes in his balance, incoordination.  Denies falls.  He denies  any recent cranial trauma.  However, he does note that he was having some palpable tenderness on the vertex of his head on his scalp.  He notes that this has resolved over the last week or so. A full 14 point review of systems was otherwise completed and is negative aside from that mentioned above in the HPI    MEDICATIONS:    Current Outpatient Medications   Medication Sig Dispense Refill     atorvastatin (LIPITOR) 40 MG tablet Take 1 tablet (40 mg total) by mouth daily. 90 tablet 3     cholecalciferol, vitamin D3, 1,000 unit tablet Take 1,000 Units by mouth daily.       HYDROcodone-acetaminophen 5-325 mg per tablet Take 1 tablet by mouth every 4 (four) hours as needed for pain. 18 tablet 0     lamoTRIgine (LAMICTAL) 200 MG tablet tAKE ONE TABLET IN THE MORNING, 1/2 TAB AT LUNCH AND 1 TABLET AT HS       levETIRAcetam (KEPPRA XR) 500 mg 24 hr tablet Take 500 mg by mouth. Take three tablets TID       metoprolol succinate (TOPROL XL) 100 MG 24 hr tablet Take 1 tablet (100 mg total) by mouth daily. 90 tablet 3     SENNA-S 8.6-50 mg tablet        warfarin (COUMADIN/JANTOVEN) 5 MG tablet TAKE 1 TO 1 1/2 TABLETS DAILY AS DIRECTED. ADJUST DOSE PER INR RESULTS. 135 tablet 1     No current facility-administered medications for this visit.          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   Social Needs     Financial resource strain: None     Food insecurity:     Worry: None     Inability: None     Transportation needs:     Medical: None     Non-medical: None   Tobacco Use     Smoking status: Never Smoker      "Smokeless tobacco: Never Used   Substance and Sexual Activity     Alcohol use: No     Drug use: Never     Sexual activity: None   Lifestyle     Physical activity:     Days per week: None     Minutes per session: None     Stress: None   Relationships     Social connections:     Talks on phone: None     Gets together: None     Attends [employer] service: None     Active member of club or organization: None     Attends meetings of clubs or organizations: None     Relationship status: None     Intimate partner violence:     Fear of current or ex partner: None     Emotionally abused: None     Physically abused: None     Forced sexual activity: None   Other Topics Concern     None   Social History Narrative    Single has had girlfriend (RN)-Renée past several years. TBI as infant-fell down steps. Cognitive problems and seizures but finished high school. Refractory seizure disorder goes to [facility]. Recent better control post senior care/disability. Lives with brother (Gary)-primary contact, remote etoh problems; non smoker. No ETOH now. 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   Awake, alert and in NAD  Eyes: Conjugate gaze. Conjunctiva benign without icterus or injection  Heart: RRR  Lungs: Non-labored respiration without accessory muscle use  Skin: No obvious rash or lesion  Psych: Appropriate mood and affect, alert and oriented x 3  Mental Status:  Speech is fluent.  Recent and remote memory are intact.  Attention span and concentration are normal.     Cranial Nerves:  CN2: No funduscopic exam performed. CN3,4 & 6: Pupillary light response, lateral and vertical gaze normal. "  No nystagmus.  Visual fields are full to confrontation. CN5: Intact to touch CN7: No facial weakness, smile, facial symmetry intact. CN8: Intact to spoken voice. CN9&10: Gag reflex, uvula midline, palate rises with phonation. CN11: Shoulder shrug 5/5 intact bilaterally. CN12: Tongue midline and moves freely from side to side.     Motor: No pronator drift of upper extremity. Normal bulk and tone all muscle groups of upper and lower extremities. Strength is 5/5 in all muscle groups of the bilateral upper and lower extremities      Sensory: Sensation intact bilaterally to light touch and temperature throughout. Vibratory sense is intact in the bl great toe.     Coordination:  Gait is WNL. Pt is able to heel and toe walk without difficulty. Tandem gait is WNL. HOLLI in the UE demonstrates mild incoordination within the right upper extremity     Reflexes; 2+ supinator, biceps, triceps. 2+ patellar and achilles. No ortiz. No clonus. Toes are down-going bilaterally    IMAGING: I personally reviewed all radiographic images    Brain MRI 7/17/2019: Subacute subdural hematoma overlying the right frontal convexity measures approximately 9 mm in greatest dimension.  There is focal mass-effect with effacement of the sulci.  Minimal midline shift, approximately 2 mm.  No signs of abnormal contrast enhancement.    CONSULTATION ASSESSMENT AND PLAN:    Jurgen Kirkland is a 59-year-old male with a past medical history significant for coronary artery disease, myocardial infarction, CABG, with a history of cardiac mural thrombus who is been on chronic Coumadin therapy who is been having several weeks of headaches and was found to have a subacute subdural hematoma overlying the right frontal convexity.    Currently the subdural hematoma is of a size that is borderline for surgical intervention.  Given that Jurgen his symptoms have improved over the last several weeks, my hope would be that the subdural has the potential to get smaller.   However, given his recent Coumadin and aspirin use, the tendency in these instances are is for the subdural to get bigger over time.  I noted that if this is the case, he has ongoing compression of the underlying brain, that I would recommend gaurang holes for evacuation of the subdural hematoma.    Plan is to continue to hold his coumadin, hold his aspirin and repeat his head CT on Monday 7/22/2019 to see if there is been any interval progression.  We will also repeat his coags at this point in time to ensure that his INR normalizes. I anticipate will follow this quite closely until we see either signs of resolution, or progression with an increase in size and worsening symptoms.  I do believe that he at high risk for needing surgical intervention for evacuation of the subdural hematoma at some point in time within the next 2 weeks.    I spent more than 60 minutes in this apt, examining the pt, reviewing the scans, reviewing notes from chart, discussing treatment options with risks and benefits and coordinating care. >50 % clinic time was spent in face to face counseling and coordinating care    Marie Bravo MD      Cc:   Jasson Abdul MD    ------------------------------------  ADDENDUM:     Head CT performed today (7/22/2019) demonstrates a stable right convexity subdural hematoma per my review.  Radiology feels as though there has been a decrease in the size of the subdural.  I contacted Mr. Kirkland who states that his headaches have improved and he is feeling better today when compared to last week.  He denies any new symptoms.  Given that his labs are normal, he feels as though his symptoms are resolving, my plan would be to follow this closely and repeat a head CT in 1 week's time.  He is in agreement with this plan.    Marie Bravo MD  07/22/19

## 2021-05-31 VITALS — WEIGHT: 219 LBS | BODY MASS INDEX: 31.42 KG/M2

## 2021-05-31 VITALS — BODY MASS INDEX: 30.85 KG/M2 | WEIGHT: 215 LBS

## 2021-05-31 NOTE — PROGRESS NOTES
Preoperative Exam    Scheduled Procedure:  RIGHT BRIAN HOLE CREATION FOR EVACUATION OF SUBDURAL HEMATOMA  Surgery Date:  08/02/19  Surgery Location: [facility], fax [phone]    Surgeon:  Marie Bravo MD    Assessment/Plan:     1. Preop exam for internal medicine  Jurgen is a 59-year-old man with history of headaches and subdural hematoma discovered earlier this month, who presents for urgent preop evaluation prior to right brian hole evacuation of subdural hematoma tomorrow.  He is in good physical condition otherwise, baseline EKG will be updated, and he had the pertinent lab work within the last 30 days including BMP, hemogram, and INR.  He confirms that he has been off of warfarin and aspirin for over 2 weeks, and has had an INR of 1.0 when he saw Dr. Bravo.  There is no indication to postpone this urgent surgery for additional testing, approved for OR.  --continue meds as is (continue holding aspirin and warfarin-- INR recently confirmed to be 1.05 and has not restarted since).    - Electrocardiogram Perform - Clinic    2. SDH (subdural hematoma) (H)    3. Apical mural thrombus  Will need to resume anticoagulation with warfarin +/- aspirin when allowable from subdural hematoma standpoint.  There is a risk of embolic stroke which is not as high as intracranial hemorrhage right now when it comes to warfarin.      4. CAD, multiple vessel  History of CABG, he is in very good physical condition currently, no chest pain or shortness of breath with exertion, and no nitroglycerin use lately.    5. CKD (chronic kidney disease) stage 3, GFR 30-59 ml/min (H)  He does have chronic kidney disease, though creatinine is below 2.0        Surgical Procedure Risk: Vascular/High (reported cardiac risk often > 5%)  Have you had prior anesthesia?: Yes  Have you or any family members had a previous anesthesia reaction:  No  Do you or any family members have a history of a clotting or bleeding disorder?: No  Cardiac Risk  Assessment: increased risk for major cardiac complications based on  RCRI Score of 1 (coronary artery disease)    APPROVAL GIVEN to proceed with proposed procedure, without further diagnostic evaluation      Functional Status: Independent  Patient plans to recover at home with family.     Subjective:      Jurgen Kirkland is a 59 y.o. male who presents for a preoperative consultation.      Earlier this month he developed headaches, discovered of subdural hematoma, interval follow-up CT 3 days ago showed some evidence of more acute bleeding, and Dr. Bravo is recommending gaurang hole evacuation.    He has coronary artery disease, had CABG, but walks 2-3 miles per day without chest pain.   There is a history of CKD, creatinine <2.    No history of diabetes.    There is a history of epilepsy, last (major) one 3 years ago.  He is on Keprra, Lamictal for this.    He has some aura- described as a sensation in his head, arm tingling.      He has a history of intracardiac thrombus but warfarin and aspiring held now for 2 weeks.      He only needed hydrocodone for 2-3 days when headahces were the worst.      All other systems reviewed and are negative, other than those listed in the HPI.    Pertinent History  Do you have difficulty breathing or chest pain after walking up a flight of stairs: No  History of obstructive sleep apnea: No  Steroid use in the last 6 months: No  Frequent Aspirin/NSAID use: NO  Prior Blood Transfusion: yes  Prior Blood Transfusion Reaction: No  If for some reason prior to, during or after the procedure, if it is medically indicated, would you be willing to have a blood transfusion?: No refuse    Current Outpatient Medications   Medication Sig Dispense Refill     atorvastatin (LIPITOR) 40 MG tablet Take 1 tablet (40 mg total) by mouth daily. 90 tablet 3     cholecalciferol, vitamin D3, 1,000 unit tablet Take 1,000 Units by mouth daily.       lamoTRIgine (LAMICTAL) 200 MG tablet tAKE ONE TABLET IN THE  MORNING, 1/2 TAB AT LUNCH AND 1 TABLET AT HS       levETIRAcetam (KEPPRA XR) 500 mg 24 hr tablet Take 500 mg by mouth. Take three tablets TID       metoprolol succinate (TOPROL XL) 100 MG 24 hr tablet Take 1 tablet (100 mg total) by mouth daily. 90 tablet 3     SENNA-S 8.6-50 mg tablet        warfarin (COUMADIN/JANTOVEN) 5 MG tablet TAKE 1 TO 1 1/2 TABLETS DAILY AS DIRECTED. ADJUST DOSE PER INR RESULTS. 135 tablet 1     No current facility-administered medications for this visit.         Allergies   Allergen Reactions     Nsaids (Non-Steroidal Anti-Inflammatory Drug) Nephrotoxicity       Patient Active Problem List   Diagnosis     Cognitive Functions Current Level Impaired     Epilepsy And Recurrent Seizures     Benign essential hypertension     CKD (chronic kidney disease) stage 3, GFR 30-59 ml/min (H)     Hyperlipemia     Traumatic Brain Injury     CAD, multiple vessel     Apical mural thrombus     S/P CABG (coronary artery bypass graft)     Warfarin anticoagulation     Sebaceous cyst     SDH (subdural hematoma) (H)       Past Medical History:   Diagnosis Date     Alcohol abuse 1990    Remote     Chronic kidney disease      CKD (chronic kidney disease) stage 3, GFR 30-59 ml/min (H)      Coronary artery disease      Epilepsy (H)      Headache      Hyperlipidemia      Hypertension      Ischemic cardiomyopathy      Mural thrombus of heart      Myocardial infarction (H)      Sebaceous cyst      Seizures (H)     [place]     Subacute subdural hematoma (H)      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   Social Needs     Financial resource strain: Not on file     Food insecurity:     Worry: Not on file     Inability: Not on file     Transportation needs:     Medical: Not on file     Non-medical: Not on file   Tobacco Use     Smoking status: Never Smoker     Smokeless tobacco: Never Used   Substance and Sexual Activity     Alcohol use: No     Drug use: Never     Sexual activity: Not on file   Lifestyle     Physical activity:     Days per week: Not on file     Minutes per session: Not on file     Stress: Not on file   Relationships     Social connections:     Talks on phone: Not on file     Gets together: Not on file     Attends Alevism service: Not on file     Active member of club or organization: Not on file     Attends meetings of clubs or organizations: Not on file     Relationship status: Not on file     Intimate partner violence:     Fear of current or ex partner: Not on file     Emotionally abused: Not on file     Physically abused: Not on file     Forced sexual activity: Not on file   Other Topics Concern     Not on file   Social History Narrative    Single has had girlfriend (RN)-Renée past several years. TBI as infant-fell down steps. Cognitive problems and seizures but finished high school. Refractory seizure disorder goes to [facility]. Recent better control post detention/disability. Lives with brother (Gary)-primary contact, remote etoh problems; non smoker. No ETOH now. 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  No pretibial edema.    Lungs: Clear to auscultation bilat  Gastrointestinal: abdomen is non-distended.    Skin: warm/dry, no rashes  Neuro: no gross abnormalities  Psychiatric: Pleasant affect     Patient Instructions   Take all medications as you usually do, but NOT the blood thinners    EKG today       EKG:  NSR, Lateral TWI, LAD, septal infarct.  I do not have any recent EKGs for comparison (the most recent one was 2013 in the system), verbal interpretation of EKG on 8/31/2018 also mentions left axis deviation, septal infarct    Reviewed neurosurgery clinic note from today discussing plan for surgery    Reviewed cardiology consultation on 7/23 from [facility], mentioning no significant improvement of LV thrombus size versus mild increase in size.  Noted CABG was August 2018.      Echo 2019-07-30 11:07.      Mild to moderate LV dilatation.      Moderate to severely decreased LV systolic function.    LVEF 30%.      Anterior/septal/apical MI with 1.5 X 2 cm lobular thrombus seen.      Normal RV size and function.      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      Mild MR, TR.      Compared to the prior study, there have been no major changes.       Thrombus appears the same size or slightly larger--imaging      differences may be responsible.       Labs:    Lab Results   Component Value Date    INR 1.05 07/22/2019    INR 3.00 (H) 07/01/2019    INR 2.90 (H) 06/17/2019      Lab Results   Component Value Date    WBC 4.7 07/22/2019    HGB 17.0 07/22/2019    HCT 49.0 07/22/2019    MCV 92 07/22/2019     07/22/2019      Results for orders placed or performed in visit on 07/15/19   Basic Metabolic Panel   Result Value Ref Range    Sodium 136 136 - 145 mmol/L    Potassium 4.5 3.5 - " 5.0 mmol/L    Chloride 104 98 - 107 mmol/L    CO2 24 22 - 31 mmol/L    Anion Gap, Calculation 8 5 - 18 mmol/L    Glucose 78 70 - 125 mg/dL    Calcium 9.7 8.5 - 10.5 mg/dL    BUN 25 (H) 8 - 22 mg/dL    Creatinine 1.73 (H) 0.70 - 1.30 mg/dL    GFR MDRD Af Amer 49 (L) >60 mL/min/1.73m2    GFR MDRD Non Af Amer 40 (L) >60 mL/min/1.73m2           Immunization History   Administered Date(s) Administered     DT (pediatric) 04/01/1997     INFLUENZA,RECOMBINANT,INJ,PF QUADRIVALENT 18+YRS 09/21/2018     Influenza, seasonal,quad inj 36+ mos 10/17/2016, 01/04/2018, 09/10/2018     Influenza, seasonal,quad inj 6-35 mos 09/24/2014     Influenza,seasonal quad, PF, 36+MOS 01/20/2016     Influenza,seasonal, Inj IIV3 09/26/2012     Pneumo Polysac 23-V 09/26/2012     Td,adult,historic,unspecified 03/10/2008     Tdap 01/07/2008, 03/10/2008     ZOSTER, LIVE 03/06/2012           Electronically signed by Rashid Gonzalez DO 08/01/19 1:49 PM

## 2021-05-31 NOTE — TELEPHONE ENCOUNTER
Pt at H&P currently. Will review along with labs after. Clinic LPN to call pt later this afternoon.   Margaret Vaughn RN

## 2021-05-31 NOTE — PROGRESS NOTES
Neurosurgery Progress Note  08/01/2019    A/P: Jurgen Kirkland is a 59 y.o. male with a past medical history significant for coronary artery disease, myocardial infarction, CABG, with a history of cardiac mural thrombus who is been on chronic Coumadin therapy who is been having several weeks of headaches and was found to have a subacute subdural hematoma overlying the right frontal convexity.    Interval imaging demonstrate mild interval increase in size but also with evidence of some interval hemorrhage. Pt's cardiology office contacted us to notify us that his repeat ECHO demonstrates persistent intramural thrombus and they recommend he resume coumadin. I had a long discussion with Parrish and his brother today in clinic and in light of his expanding hematoma (compared to 7/17 scan), and his need to resume anticoagulation, I recommended right sided gaurang holes for evacuation of subdural hematoma. We discussed the risks, benefits, and alternatives to surgery and he wished to proceed.    S:  Denies any recent headaches- maybe one a few days ago. Feels like they are more noticeable when he is resting, less when he's up and active.    PMH: Repeat   PSH: No interval change    ROS: Pt denies any new numbness, tingling, weakness, changes in vision or speech.. A full 14 point review of systems was otherwise completed and is negative aside from that mentioned above in the HPI    O:  Vitals:    08/01/19 0927   BP: 130/78   Pulse: 77   SpO2: 96%     General: Awake, alert, NAD  HEENT: AT/NC, EOMI, face symmetric, oral mucosa moist and pink  Heart: RRR  Lungs: Nonlabored respirations without accessory muscle use.  Neuro: Awake, alert, speech is clear and content is appropriate. MAEW x 4 with full strength in b/l UE and LE. Sensation is intact to temperature and LT. No drift. CN II-XII grossly intact  Coordination: HOLLI WNL. Gait is WNL. Pt able to heel and toe walk without difficulty  Reflexes: 2+ in the UE and LE. No clonus. Toes  downgoing bilaterally.  Musculoskeletal: Negative straight leg raise  Psych: Appropriate mood and affect, pleasant, cooperative, alert and oriented x 3  Skin: No obvious rash or lesion    I personally reviewed and visualized the following radiographic images:  Head CT 7/29/2019: Persistent extra-axial fluid collection overlying the right frontal lobe which measures up to 12 mm in maximal thickness this does appear to have a mild interval increase compared to prior.  Additionally there is new increased density within this fluid collection with a component of acute hemorrhage noted in the anterior superior portion and the remaining of the subdural appears subacute in nature.    Marie Bravo MD

## 2021-05-31 NOTE — TELEPHONE ENCOUNTER
ORDER FROM: Dr. Bravo    PRE AUTHORIZATION: No PA required; Ok to schedule    METHOD OF PATIENT CONTACT: Spoke with patient at his consult visit; Best number to reach him: [phone]    PROCEDURE: Right gaurang hole creation for evacuation of subdural hematoma    SURGICAL DATE: 08.02.19 @ 11:30 a.m.    READINESS VISIT: Please call    PCP, CLINIC, PHONE#: Dr. Abdul; [facility]; [phone]; Pre-op physical: 08.01.19 at 2:00 p.m., with Dr. Gonzalez    FILM INFO: Brain MRI: 07.17.19 @ [facility]; Head CT: 07.29.19 @ [facility]    SURGICAL LETTER: Given to patient today, 08.01.19

## 2021-05-31 NOTE — PROGRESS NOTES
Jurgen Kirkland presents for a follow up after a recent head CT. He would like to know if he can go back on warfarin. He also feels there may be a staple in his incision area that was never removed. It is difficult to tell with the scab. He denies any pain, weakness, numbness/tingling, gait/balance changes or bowel/bladder concerns.   Ileana Mueller LPN

## 2021-05-31 NOTE — ANESTHESIA CARE TRANSFER NOTE
Last vitals:   Vitals:    08/02/19 1756   BP: 156/81   Pulse: 73   Resp: 14   Temp: 37.1  C (98.8  F)   SpO2: 100%     Patient's level of consciousness is drowsy  Spontaneous respirations: yes  Maintains airway independently: yes  Dentition unchanged: yes  Oropharynx: oropharynx clear of all foreign objects    QCDR Measures:  ASA# 20 - Surgical Safety Checklist: WHO surgical safety checklist completed prior to induction    PQRS# 430 - Adult PONV Prevention: 4558F - Pt received => 2 anti-emetic agents (different classes) preop & intraop  ASA# 8 - Peds PONV Prevention: NA - Not pediatric patient, not GA or 2 or more risk factors NOT present  PQRS# 424 - Naz-op Temp Management: 4559F - At least one body temp DOCUMENTED => 35.5C or 95.9F within required timeframe  PQRS# 426 - PACU Transfer Protocol: - Transfer of care checklist used  ASA# 14 - Acute Post-op Pain: ASA14B - Patient did NOT experience pain >= 7 out of 10

## 2021-05-31 NOTE — TELEPHONE ENCOUNTER
Patient has approval from physical for surgery and Dr. Bravo approved all labs. He is good to go for surgery tomorrow. I did let him know there is no food or drink after 11:00 tonight. He may have clear liquids up until 4 hours prior to procedure. He has not taken his warfarin and continue to hold until directed otherwise. He will take his morning dose of keppra tomorrow with only a sip of water. He confirmed getting his hibiclens at his appointment with Dr. Bravo today and verbalized understanding about how to use it.   Ileana Mueller LPN

## 2021-05-31 NOTE — PROGRESS NOTES
Neurosurgery Progress Note  08/30/19    A/P: Jurgen Kirkland is a 59 y.o. male with a PMH sig for MI with cardiac mural thrombus on chronic coumadin therapy who is sp right gaurang holes for evacuation of SDH on 8/2/2019    Unfortunately, we are still holding Mr. Kirkland' coumadin in light of findings today concerning for some interval bleeding. Pt not on any blood thinner at present  Will cont to follow closely with plan for repeat head CT in 2 weeks  Scab overlying wound appears WNL- instructed not to touch unless washing in the shower- should fall off on its own over the next several weeks    S: ISREAL. Denies HA. Pt denies any new numbness, tingling, weakness, changes in vision or speech.    PMH: No interval change; cardiac MRI demonstrates persistent cardiac mural thrombus  PSH: No interval change    O:  Vitals:    08/30/19 1252   BP: (!) 147/99   Pulse: 62   Resp: 16   SpO2: 97%     General: Awake, alert, NAD  Heart: RRR  Lungs: Nonlabored respirations without accessory muscle use.  Neuro: Awake, alert, speech is clear and content is appropriate. Repetition is intact. MAEW x 4 with full strength in b/l UE and LE. Sensation is intact to temperature and LT. CN II-XII is grossly intact  Coordination: HOLLI WNL. No drift. No dysmetria with finger to nose. Gait WNL. Pt able to heel and toe walk and tandem gait is WNL.  Reflexes: No clonus. Toes downgoing bilaterally.  Psych: Appropriate mood and affect, pleasant, cooperative, alert and oriented x 3  Skin: Incision C/D/I- scab ~5-6mm in size without erythema, induration over frontal gaurang hole incision site    I personally reviewed and visualized the following radiographic images:  Head CT 8/30/2019: Overall subdural fluid collection appears stable in size compared to last imaging. However, there is an increase in density of the fluid collection quite evenly dispersed throughout. May be some interval hemorrhage; cannot exclude    Marie Bravo MD  08/30/19

## 2021-05-31 NOTE — TELEPHONE ENCOUNTER
PATIENT NAME:  Jurgen Kirkland  YOB: 1960  MRN: [id number]  SURGEON:  DR. BRAVO  DATE of SURGERY: 8-2-19   PROCEDURE: RIGHT BRIAN HOLE CREATION FOR EVACUATION OF SUBDURAL HEMATOMA (Right)    FOLLOW-UP:  Staples Out : 14 Days [On nurse schedule unless noted otherwise]  Post Op Visit: 2 weeks   Post-op Provider: dr. Bravo/ trauma clinic (8-15-19)  DIAGNOSTICS:  radiology: CT scan: head, without  (ordered 8-6-19)  DISPOSITION:  Home with wife 8-6-19    ADDITIONAL INSTRUCTIONS FOR MEDICAL STAFF:    Pt's cardiology office contacted us to notify us that his repeat ECHO demonstrates persistent intramural thrombus. Cardiology would like AC restarted as soon as safe from neurosurgical perspective

## 2021-05-31 NOTE — PATIENT INSTRUCTIONS - HE
Continue with restrictions. Pt is to remain OFF blood thinners. Follow up in 2 weeks with new head CT.

## 2021-05-31 NOTE — ANESTHESIA PREPROCEDURE EVALUATION
Anesthesia Evaluation      Patient summary reviewed   No history of anesthetic complications     Airway   Mallampati: I  Neck ROM: full   Pulmonary - negative ROS and normal exam                          Cardiovascular - normal exam  Exercise tolerance: > or = 4 METS (Pt walks his dog over 2 miles every day without shortness of breath or CP.)  (+) hypertension, past MI (Apical thrombus, on Warfarin-off for 2 weeks), CAD, CABG/stent (s/p CABG), cardiomyopathy (Ischemic, LV EF 30%),     ECG reviewed        Neuro/Psych    (+) seizures (Epilepsy, last Grand Mal 3 years, does occasioally have auras ) well controlled,     Comments: Subacute subdural hematoma  HA    TBI, s/p craniotomy 1960, 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    Mild to moderate LV dilatation.     Moderate to severely decreased LV systolic function.    LVEF 30%.     Anterior/septal/apical MI with 1.5 X 2 cm lobular thrombus seen.     Normal RV size and function.     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     Mild MR, TR.     Compared to the prior study, there have been no major changes.      Thrombus appears the same size or slightly larger--imaging     differences may be responsible.      Left Ventricular Ejection Fraction: 30 %       07/29/19 1635 CT Head Without Contrast   CONCLUSION:  1. Although no significant change in size of the right frontoparietal subdural hematoma, there is increased attenuation, suggestive of interval hemorrhage. No significant change in local mass effect.           Dental - normal exam                        Anesthesia Plan  Planned anesthetic: general endotracheal  Prn arterial line      ASA 3   Induction: intravenous   Anesthetic plan and risks discussed with: patient  Anesthesia plan special considerations: antiemetics,   Post-op plan:  routine recovery

## 2021-05-31 NOTE — ANESTHESIA POSTPROCEDURE EVALUATION
Patient: Jurgen Kirkland  RIGHT BRIAN HOLE CREATION FOR EVACUATION OF SUBDURAL HEMATOMA  Anesthesia type: general    Patient location: PACU  Last vitals:   Vitals Value Taken Time   /85 8/2/2019  7:40 PM   Temp 36.7  C (98  F) 8/2/2019  7:00 PM   Pulse 61 8/2/2019  7:44 PM   Resp 22 8/2/2019  7:44 PM   SpO2 98 % 8/2/2019  7:44 PM   Vitals shown include unvalidated device data.  Post vital signs: stable  Level of consciousness: awake, alert and oriented  Post-anesthesia pain: pain controlled  Post-anesthesia nausea and vomiting: no  Pulmonary: unassisted, nasal cannula  Cardiovascular: stable and blood pressure at baseline  Hydration: adequate  Anesthetic events: no    QCDR Measures:  ASA# 11 - Naz-op Cardiac Arrest: ASA11B - Patient did NOT experience unanticipated cardiac arrest  ASA# 12 - Naz-op Mortality Rate: ASA12B - Patient did NOT die  ASA# 13 - PACU Re-Intubation Rate: ASA13B - Patient did NOT require a new airway mgmt  ASA# 10 - Composite Anes Safety: ASA10A - No serious adverse event    Additional Notes:  Cardiology has requested that anticoagulation be restarted as soon as possible post surgery.  This was relayed to the intensivist and surgeon.

## 2021-06-01 VITALS — WEIGHT: 223 LBS | BODY MASS INDEX: 31.92 KG/M2 | HEIGHT: 70 IN

## 2021-06-01 VITALS — WEIGHT: 221 LBS | BODY MASS INDEX: 31.71 KG/M2

## 2021-06-01 NOTE — PATIENT INSTRUCTIONS - HE
I would keep doing what you're doing, avoiding any strenuous activities, no aspirin, nsaids, blood thinners until you hear from us otherwise. Continue to hold your coumadin until you hear from our office or Dr. Bravo. I think we will do another CT in two weeks, but I will double check. Keep taking your seizure meds.     Call [phone] with the following symptoms:    *Temperature 101(fever) or greater or chills  *Redness, swelling or increased drainage from the wound  *Worsening pain not relieved by the pain prescription given  *Worsening or new onset of weakness, or numbness and tingling  *Loss or change in your ability to control bowel or bladder function  *Change in your ability to walk, talk, see or think  *If you did not have a bowel movement before leaving the hospital and your bowels have not moved within 48 hours of your discharge    !!!! IF YOU HAVE A SERIOUS OR THREATENING EMERGENCY, CALL 911 OR COME TO THE EMERGENCY ROOM.  IF YOUR CONDITIONS ALLOWS COME TO THE HOSPITAL WHERE YOUR SURGERY WAS PERFORMED.    QUESTIONS OR CONCERNS:   OUR OFFICE HOURS ARE FROM 9:00 A.M -4:30 P.M. MONDAY-FRIDAY.  AFTER OFFICE HOURS, CALLS ARE ANSWERED BY THE ON-CALL PROVIDER. PLEASE CALL WITH ROUTINE QUESTIONS DURING OFFICE HOURS. THE ON-CALL PROVIDER WILL NOT REFILL PRESCRIPTIONS.      *No lifting, pushing or pulling greater than 5-10 pound (this is about a gallon of milk).  *No repetitive bending, twisting, or jarring activities  *No overhead work  *No aerobic or strenuous activity  *No activities with increased risk of falls  *You may move about your home as tolerated  *You may walk up and down stairs as tolerated  *You may increase your activity slowly over the next 4-6 weeks    * WALKING PROGRAM: As you can tolerate, walk daily-start with 5-10 minutes of continuous walking. This is in addition to the walking that you do as part of your daily activities. Increase the time that you walk by 5 minutes every couple of days.  Do not exceed 30-45 minutes of continuous walking until seen in follow-up. Walking is the best exercise after surgery.  **Listen to your body, if you find that you are more painful or fatigued, you may need to proceed more slowly.    **Do not smoke or expose yourself to second hand smoke. Cigarette smoke can delay healing and cause complications.

## 2021-06-01 NOTE — PROGRESS NOTES
Preoperative Exam    Scheduled Procedure: Craniotomy  Surgery Date:  9/13/19  Surgery Location: [facility], fax [phone]    Surgeon:  Dr. Bravo    Assessment/Plan:     1. Preop general physical exam  I find no contraindication to proceeding with neurosurgery for subdural hematoma.  I recommend proceeding as planned with the procedure. His heart failure is compensated, and ischemic heart disease is s/p CABS and asymptomatic.   - Electrocardiogram Perform and Read    2. Benign essential hypertension  Controlled.      3. CAD, multiple vessel  Asymptomatic.     4. Apical mural thrombus  History of this one year ago associated with acute coronary ischemia. No embolic phenomena.     5. CKD (chronic kidney disease) stage 3, GFR 30-59 ml/min (H)  Creatinine 1.9 8/27/2019.      6. Warfarin anticoagulation  Held at this time.     7. SDH (subdural hematoma)  Likely spontaneous, initially occurred on warfarin, atraumatic.  And now with recurrent.     Surgical Procedure Risk: Low (reported cardiac risk generally < 1%)  Have you had prior anesthesia?: yes  Have you or any family members had a previous anesthesia reaction:  No  Do you or any family members have a history of a clotting or bleeding disorder?: No  Cardiac Risk Assessment: increased risk for major cardiac complications based on  due to history of chronic systolic heart failure and CABS    APPROVAL GIVEN to proceed with proposed procedure, without further diagnostic evaluation    Functional Status: Independent  Patient plans to recover at home with family.     Subjective:      Jurgen Kirkland is a 59 y.o. male who presents for a preoperative consultation.  He has very little symptoms from his current SDH.  He had headache initially in the occipital area, now that is improved.  No nausea or visual disturbance, no arm or leg weakness or numbness, and no dizziness, or gait ataxia.  No unusual dyspnea, or chest pain.      All other systems reviewed and are  negative, other than those listed in the HPI.    Pertinent History  Do you have difficulty breathing or chest pain after walking up a flight of stairs: No  History of obstructive sleep apnea: No  Steroid use in the last 6 months: No  Frequent Aspirin/NSAID use: No  Prior Blood Transfusion: No  Prior Blood Transfusion Reaction: No  If for some reason prior to, during or after the procedure, if it is medically indicated, would you be willing to have a blood transfusion?:  There is no transfusion refusal.    Current Outpatient Medications   Medication Sig Dispense Refill     atorvastatin (LIPITOR) 40 MG tablet Take 1 tablet (40 mg total) by mouth daily. 90 tablet 3     cholecalciferol, vitamin D3, 1,000 unit tablet Take 1,000 Units by mouth daily.       lamoTRIgine (LAMICTAL) 200 MG tablet tAKE ONE TABLET IN THE MORNING, 1/2 TAB AT LUNCH AND 1 TABLET AT HS       levETIRAcetam (KEPPRA) 500 MG tablet Take 1,500 mg by mouth 3 (three) times a day.       metoprolol succinate (TOPROL XL) 100 MG 24 hr tablet Take 1 tablet (100 mg total) by mouth daily. 90 tablet 3     warfarin (COUMADIN/JANTOVEN) 5 MG tablet Hold until seen by neurosurgery  1     No current facility-administered medications for this visit.       Allergies   Allergen Reactions     Nsaids (Non-Steroidal Anti-Inflammatory Drug) Nephrotoxicity     Patient Active Problem List   Diagnosis     Cognitive Functions Current Level Impaired     Epilepsy And Recurrent Seizures     Benign essential hypertension     CKD (chronic kidney disease) stage 3, GFR 30-59 ml/min (H)     Hyperlipemia     Traumatic Brain Injury     CAD, multiple vessel     Apical mural thrombus     S/P CABG (coronary artery bypass graft)     Warfarin anticoagulation     Sebaceous cyst     SDH (subdural hematoma) (H)     Subdural hematoma (H)     Compression of brain (H)     Midline shift of brain     Past Medical History:   Diagnosis Date     Alcohol abuse 1990    Remote     Chronic kidney disease       CKD (chronic kidney disease) stage 3, GFR 30-59 ml/min (H)      Coronary artery disease      Epilepsy (H)      Family history of alcohol abuse      Headache      Hyperlipidemia      Hypertension      Ischemic cardiomyopathy      Mural thrombus of heart      Myocardial infarction (H)      Sebaceous cyst      Seizures (H)     Grand Mal     Subacute subdural hematoma (H)      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   Social Needs     Financial resource strain: Not on file     Food insecurity:     Worry: Not on file     Inability: Not on file     Transportation needs:     Medical: Not on file     Non-medical: Not on file   Tobacco Use     Smoking status: Never Smoker     Smokeless tobacco: Never Used   Substance and Sexual Activity     Alcohol use: No     Drug use: Never     Sexual activity: Not on file   Lifestyle     Physical activity:     Days per week: Not on file     Minutes per session: Not on file     Stress: Not on file   Relationships     Social connections:     Talks on phone: Not on file     Gets together: Not on file     Attends Mandaeism service: Not on file     Active member of club or  "organization: Not on file     Attends meetings of clubs or organizations: Not on file     Relationship status: Not on file     Intimate partner violence:     Fear of current or ex partner: Not on file     Emotionally abused: Not on file     Physically abused: Not on file     Forced sexual activity: Not on file   Other Topics Concern     Not on file   Social History Narrative    Single has had girlfriend (RN)-Renée past several years. TBI as infant-fell down steps. Cognitive problems and seizures but finished high school. Refractory seizure disorder goes to [place] [facility]. Recent better control post senior living/disability. Lives with brother (Gary)-primary contact, remote etoh problems; non smoker. No ETOH now. 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[employer]; cabin in the [facility]/      Patient Care Team:  Jasson Abdul MD as PCP - General (Internal Medicine)  Marie Bravo MD as Surgeon (Neurosurgery)    Objective:     Vitals:    09/12/19 1400   BP: 132/86   Pulse: (!) 56   SpO2: 98%   Weight: 216 lb 7 oz (98.2 kg)   Height: 5' 9\" (1.753 m)     PHYSICAL EXAM:  General Appearance: In no acute distress  /86 (Patient Site: Left Arm, Patient Position: Sitting, Cuff Size: Adult Large)   Pulse (!) 56   Ht 5' 9\" (1.753 m)   Wt 216 lb 7 oz (98.2 kg)   SpO2 98%   BMI 31.96 kg/m    NECK:  without cervical or axillary adenopathy  RESPIRATORY: Clear to auscultation  CARDIOVASCULAR: S1, S2, without murmur   ABDOMEN: soft, flat, and non-tender, without mass, rebound, or guarding  EXTREMITIES: No joint swelling, or inflammation, no ulcer or edema  NEUROLOGIC: No arm or leg  weakness, speech is clear, gait is normal  PSYCHIATRIC: Oriented X 3, without confusion, behavior and affect normal, thinking is clear    Labs:  Recent Results (from the past 48 hour(s))   HM2(CBC w/o Differential)    Collection Time: 09/12/19 11:03 AM   Result Value Ref Range    WBC 6.0 4.0 - 11.0 thou/uL    RBC 5.06 4.40 - 6.20 mill/uL    " Hemoglobin 16.4 14.0 - 18.0 g/dL    Hematocrit 48.2 40.0 - 54.0 %    MCV 95 80 - 100 fL    MCH 32.4 27.0 - 34.0 pg    MCHC 34.0 32.0 - 36.0 g/dL    RDW 12.1 11.0 - 14.5 %    Platelets 157 140 - 440 thou/uL    MPV 9.4 8.5 - 12.5 fL   APTT(PTT)    Collection Time: 09/12/19 11:03 AM   Result Value Ref Range    PTT 25 24 - 37 seconds   Platelet Function Test    Collection Time: 09/12/19 11:03 AM   Result Value Ref Range    PFA-COL/EPI 87 1 - 180 sec   INR    Collection Time: 09/12/19 11:03 AM   Result Value Ref Range    INR 1.00 0.90 - 1.10   Electrocardiogram Perform and Read    Collection Time: 09/12/19  2:19 PM   Result Value Ref Range    SYSTOLIC BLOOD PRESSURE      DIASTOLIC BLOOD PRESSURE      VENTRICULAR RATE 52 BPM    ATRIAL RATE 52 BPM    P-R INTERVAL 172 ms    QRS DURATION 108 ms    Q-T INTERVAL 412 ms    QTC CALCULATION (BEZET) 383 ms    P Axis 30 degrees    R AXIS -44 degrees    T AXIS 128 degrees    MUSE DIAGNOSIS       Sinus bradycardia  Left axis deviation  Left ventricular hypertrophy with repolarization abnormality  Cannot rule out Septal infarct (cited on or before 03-SEP-2013)  Abnormal ECG  When compared with ECG of 05-AUG-2019 17:58,  Serial changes of Septal infarct Present          Immunization History   Administered Date(s) Administered     DT (pediatric) 04/01/1997     INFLUENZA,RECOMBINANT,INJ,PF QUADRIVALENT 18+YRS 09/21/2018     Influenza, seasonal,quad inj 6-35 mos 09/24/2014     Influenza,seasonal quad, PF, =/> 6months 01/20/2016     Influenza,seasonal, Inj IIV3 09/26/2012     Influenza,seasonal,quad inj =/> 6months 10/17/2016, 01/04/2018, 09/10/2018     Pneumo Polysac 23-V 09/26/2012     Td,adult,historic,unspecified 03/10/2008     Tdap 01/07/2008, 03/10/2008     ZOSTER, LIVE 03/06/2012     Electronically signed by Jasson Abdul MD 09/12/19 2:02 PM

## 2021-06-01 NOTE — ANESTHESIA PROCEDURE NOTES
Arterial Line  Reason for Procedure: hemodynamic monitoring  Patient location during procedure: Pre-op  Start time: 9/13/2019 10:10 AM  End time: 9/13/2019 10:25 AM  Staffing:  Performing  Anesthesiologist: Kati Ahmadi MD  Sterile Precautions:  sterile barriers used during insertion: cap, mask, sterile gloves, large sheet, and hand hygiene used.  Arterial Line:     Laterality: right  Location: radial  Prepped with: ChloroPrep    Needle gauge: 20 G  Number of Attempts: 1  Secured with: transparent dressing (suture)    1% lidocaine local anesthesia used for skin prep.   See MAR for additional medications given.

## 2021-06-01 NOTE — PROGRESS NOTES
CHART NOTE     DATE OF SERVICE:  10/2/2019     : 1960   59 y.o.     ASSESSMENT :   Left inner thigh redness, firm, pain. Concern for DVT given he has been off his coumadin. From SDH perspective, he is doing well, minimal headaches, no seizures.     PLAN:   1. Femoral vein DVT occlusive on left - plan direct admission and consult for IVC filter  2. Discussed with Dr. Brooks who recommended retrievable IVC filter but will consult hematology on admission  3. Awaiting bed from bed control, provider to provider conversation done  4. Original plan was to hold anticoagulation given his persistent subdural fluid collection and hx of rebleed - this plan may change. Will update       HPI:  Jurgen Kirkland is status post initial subdural hematoma evacuation on  2019.  He has a history of coronary artery disease, seizure disorder, myocardial infarction, CABG, with a history of cardiac mural thrombus on chronic Coumadin therapy.  On initial presentation, he presented with persistent headaches and was found to have a subacute subdural hematoma overlying the right frontal convexity.  He underwent bur hole evacuation, but unfortunately his subdural fluid collection reaccumulated.  He then went for craniotomy to evacuate the subdural hematoma on 2019.  His Coumadin has been held.  This was previously discussed with his cardiologist.  He had been doing quite well after the craniotomy, did suffer one seizure for which his medications were adjusted.  He has not had any seizures since his surgery.  He has had improvement in his headaches, and was doing quite well at home.  Today during his clinic visit, he described some left anterior thigh pain, redness, firm to the touch.  Given he was off of his anticoagulation I sent him for an ultrasound of his lower extremity which returned positive for occlusive femoral DVT.  He was then directly admitted to the hospital for filter placement.  He denies recent  anticoagulation use, no aspirin NSAIDs or blood thinners.  The only activity he has been doing for the last few weeks is walking his dog.       PAST MEDICAL HISTORY, SURGICAL HISTORY, REVIEW OF SYMPTOMS, MEDICATIONS AND ALLERGIES:  Past medical history, surgical history, ROS, medications and allergies reviewed with patient and remain unchanged from previous visit.    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   Skin: W/D/I. No rashes, lesions or breaks in integrity.   Recent and remote memory intact, fund of knowledge wnl.    Alert and oriented x3, speech fluent and appropriate.   Comprehension intact with 2 step crossover command.   Finger nose finger smooth and accurate  PERRL, EOMI, No nystagmus,   Face symmetric, tongue midline, Uvula midline,  palate rises with phonation   Shoulder shrug equal  Arm strength bilateral grasp, biceps, triceps, and deltoids 5/5   Leg strength bilateral dorsiflexion, plantar flexion, and hip flexion 5/5  No extremity edema noted.    Muscle Bulk and tone wnl.   Gait and station:normal  Incision: CDI without erythema or edema  NDI/TONO:      RADIOGRAPHIC IMAGING: Films personally reviewed and interpreted.  Also reviewed and discussed with   Lawrence.   Reviewed imaging with patient and family. EXAM: CT HEAD WO CONTRAST  LOCATION: [facility]  DATE/TIME: 10/2/2019 12:27 PM     INDICATION: Headache. Subdural hematoma evacuation.  COMPARISON: CT head dated 9/14/2019  TECHNIQUE: CT head without IV contrast. Multiplanar reformats. Dose reduction techniques were used.     FINDINGS:   INTRACRANIAL CONTENTS:   Status post right frontal craniotomy with resolved pneumocephalus and persistent mixed density subdural collection overlying the right frontal convexity measuring 1.0 cm in maximum thickness at the level of the septum pellucidum on coronal imaging   (series 4.2 image #45) with mild mass effect on the subjacent brain parenchyma. Again seen are scattered periventricular and deep cortical white matter areas of low attenuation, likely representing sequelae of chronic microangiopathy. No acute loss of   gray-white matter differentiation to suggest large territorial infarction.      Mild cerebral volume loss with appropriate size and morphology of the ventricular system without shift of the midline structures. No extra-axial fluid collections.  Patent basal cisterns.      SINUSES/MASTOIDS/ORBITS: Polyp/mucosal retention cyst in the bilateral maxillary sinuses, otherwise the visualized paranasal sinuses and temporal bone structures are well-aerated. The orbits are unremarkable.     BONES/SOFT TISSUES: The calvarium and skull base are unremarkable. Intracranial vascular calcifications.      IMPRESSION:      Maturing posttreatment change from right cerebral convexity subdural hematoma evacuation with persistent mixed subdural collection overlying the right cerebral convexity as described

## 2021-06-01 NOTE — TELEPHONE ENCOUNTER
Called patient, discussed surgery, post-op course, expectations, follow up plan.    Reviewed H&P from 9/12/2019 - cleared for surgery  Labs, EKG - WNL    CT done on 9/12/2019 - in [place]    To OR as planned.     Check in - 0815    Nothing to eat or drink after midnight the night before surgery.     Bring all pertinent films to hospital the day of surgery.     Continue to refrain from NSAIDS (Ibuprofen, Aleve, Naprosyn), ASA, Over the counter herbal medications or supplements, anti-coagulants and blood thinners.     Patient confirmed they have help/assistance in place at home upon discharge    Answered all questions to patient's satisfaction.    Nalini Whittington, RN, CNRN

## 2021-06-01 NOTE — ANESTHESIA CARE TRANSFER NOTE
Patient spontaneously breathing.  Tidal volumes > 400ml.  Following commands, suctioned and extubated with balloon down.  O2 via mask at 10L.  To PACU, VSS, SBAR report to RN per institutional handoff policy and procedure.  Transfer of care.    Last vitals:   Vitals:    09/13/19 1533   BP: 175/76   Pulse: 66   Resp: 20   Temp: 36.7  C (98.1  F)   SpO2: 94%     Patient's level of consciousness is drowsy  Spontaneous respirations: yes  Maintains airway independently: yes  Dentition unchanged: yes  Oropharynx: oropharynx clear of all foreign objects    QCDR Measures:  ASA# 20 - Surgical Safety Checklist: WHO surgical safety checklist completed prior to induction    PQRS# 430 - Adult PONV Prevention: 4558F - Pt received => 2 anti-emetic agents (different classes) preop & intraop  ASA# 8 - Peds PONV Prevention: NA - Not pediatric patient, not GA or 2 or more risk factors NOT present  PQRS# 424 - Naz-op Temp Management: 4559F - At least one body temp DOCUMENTED => 35.5C or 95.9F within required timeframe  PQRS# 426 - PACU Transfer Protocol: - Transfer of care checklist used  ASA# 14 - Acute Post-op Pain: ASA14B - Patient did NOT experience pain >= 7 out of 10

## 2021-06-01 NOTE — PROGRESS NOTES
Neurosurgery Progress Note  09/12/19    A/P: Jurgen Kirkland is a 59 y.o. male with a PMH sig for MI with cardiac mural thrombus on chronic coumadin therapy sp gaurang hole for evacuation of SDH on 8/2/2019    Interval increase in size of SDH  I discussed with Parrish that given the size of the SDH, and his failure with gaurang hole treatment, I would recommend craniotomy for evacuation of SDH. We discussed the risks, benefits, and alternatives to surgery and he wishes to proceed. Will obtain repeat labs today for further evaluation. Pt has not been on any blood thinners.    S:  No complaints. No headaches. Pt denies any new numbness, tingling, weakness, changes in vision or speech.    O:  Vitals:    09/12/19 1030   BP: 112/70   Pulse: 68   Resp: 18     General: Awake, alert, NAD  HEENT: AT/NC, EOMI, face symmetric, oral mucosa moist and pink  Heart: RRR  Lungs: Nonlabored respirations without accessory muscle use.  Neuro: Awake, alert, speech is clear and content is appropriate. MAEW x 4 with full strength in b/l UE and LE. Sensation is intact to temperature and LT. No drift. CN II-XII are grossly intact  Coordination: HOLLI WNL with very subtle dysdiadochokinesia with ongoing repetition. No dysmetria with finger to nose testing. Gait WNL. Heel and toe walking WNL. Tandem gait WNL  Reflexes: 2+ DTR. No clonus. Toes downgoing bilaterally.  Musculoskeletal: Negative straight leg raise bl  Psych: Appropriate mood and flat affect, stable at baseline, pleasant, cooperative, alert and oriented x 3  Skin: Incision C/D/I well healed    I personally reviewed and visualized the following radiographic images:  Head CT 9/12/2019 demonstrates ongoing interval expansion of the SDH on the right frontal convexity- it has nearly doubled in size with new mild midline shift    Marie Bravo MD  09/12/19

## 2021-06-01 NOTE — ANESTHESIA POSTPROCEDURE EVALUATION
Patient: Jurgen Kirkland  RIGHT CRANIOTOMY FOR EVACUATION SUBDURAL HEMATOMA EVACUATION  Anesthesia type: general    Patient location: PACU  Last vitals:   Vitals Value Taken Time   /68 9/13/2019  4:30 PM   Temp 36.7  C (98.1  F) 9/13/2019  3:33 PM   Pulse 66 9/13/2019  4:31 PM   Resp 18 9/13/2019  4:31 PM   SpO2 92 % 9/13/2019  4:31 PM   Vitals shown include unvalidated device data.  Post vital signs: stable  Level of consciousness: awake and responds to simple questions  Post-anesthesia pain: pain controlled  Post-anesthesia nausea and vomiting: no  Pulmonary: unassisted, return to baseline  Cardiovascular: stable and blood pressure at baseline  Hydration: adequate  Anesthetic events: no    QCDR Measures:  ASA# 11 - Naz-op Cardiac Arrest: ASA11B - Patient did NOT experience unanticipated cardiac arrest  ASA# 12 - Naz-op Mortality Rate: ASA12B - Patient did NOT die  ASA# 13 - PACU Re-Intubation Rate: ASA13B - Patient did NOT require a new airway mgmt  ASA# 10 - Composite Anes Safety: ASA10A - No serious adverse event    Additional Notes:

## 2021-06-01 NOTE — TELEPHONE ENCOUNTER
PATIENT NAME:  Jurgen Kirkland  YOB: 1960  MRN: [id number]  SURGEON: Dr. Bravo  DATE of SURGERY: 9-13-19  PROCEDURE: RIGHT CRANIOTOMY FOR EVACUATION SUBDURAL HEMATOMA EVACUATION    FOLLOW-UP:  [facility] Out : 14 Days [On nurse schedule unless noted otherwise]  Post Op Visit: 2 weeks   Post-op Provider: NP  DIAGNOSTICS:  radiology: CT scan: head, without  (ordered 9-23-19)  DISPOSITION: home 9-19-19    ADDITIONAL INSTRUCTIONS FOR MEDICAL STAFF:     Continue seizure meds per neuro. Continue to hold coumadin and aspirin until follow up (hx of cardiac mural thrombus, CABG). Patient was also instructed to f/u with his personal neurologist Dr Wren within 2 weeks as his meds have been changed in setting of SDH.

## 2021-06-01 NOTE — TELEPHONE ENCOUNTER
ANTICOAGULATION  MANAGEMENT: Discharge Continuity of Care Review    Outpatient surgery/procedure on  9/1 for craniotomy.    Discharge disposition: Home    INR Results:       No results for input(s): INR in the last 168 hours.  Patient is currently holding warfarin and asa over 2 weeks due to SDH    Plan       Dakota Dowling RN

## 2021-06-01 NOTE — PROGRESS NOTES
ASSESSMENT AND PLAN:    1. Screen for colon cancer  Wants to defer for now. Given anticoagulation, cologuard would be best. Not high risk.     2. Hyperlipemia  Continue atorvastatin.     3. SDH (subdural hematoma)  POD #13 repeat evacuation craniotomy.  Doing well.    - amLODIPine (NORVASC) 10 MG tablet; Take 1 tablet (10 mg total) by mouth daily.  Dispense: 30 tablet; Refill: 5  - lisinopril (PRINIVIL,ZESTRIL) 10 MG tablet; Take 1 tablet (10 mg total) by mouth daily. See PCP for refill  Dispense: 30 tablet; Refill: 5    4. Seizure disorder  Ongoing treatment.  No episodes.     5. Warfarin anticoagulation - for history of mural thrombus, associated with MI  Continue to hold per neurosurgery.       Patient Instructions   1. No changes in medications recommended.     2. Follow up as previously arranged.      CHIEF COMPLAINT:  Chief Complaint   Patient presents with     Hospital Visit Follow Up     [facility], discharged 9/19/19     Colon Cancer Screening     patient due, orders pending     HISTORY OF PRESENT ILLNESS:  Jurgen Kirkland is a 59 y.o. male with follow up post repeat craniotomy for SDH evacuation.  Warfarin has been held.  He is feeling OK, no unusual dyspnea, or nausea or fever, or cough.      REVIEW OF SYSTEMS:   See HPI, all other systems on review are negative.    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; 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;  "burrholes-seizures     Warfarin anticoagulation      Social History     Tobacco Use   Smoking Status Never Smoker   Smokeless Tobacco Never Used     Family History   Problem Relation Age of Onset     Heart disease Mother      Diabetes Mother      Heart disease Sister      Heart disease Father      Past Surgical History:   Procedure Laterality Date     BRIAN HOLE OF CRANIUM Right 8/2/2019    Procedure: RIGHT BRIAN HOLE CREATION FOR EVACUATION OF SUBDURAL HEMATOMA;  Surgeon: Marie Bravo MD;  Location: [facility];  Service: Neurosurgery     COLONOSCOPY N/A 2010    normal-q 10 yrs     CORONARY ARTERY BYPASS GRAFT      9/18 [facility].      CRANIOTOMY N/A 1960    infant with TBI/neurosurgical intervention     CRANIOTOMY Right 9/13/2019    Procedure: RIGHT CRANIOTOMY FOR EVACUATION SUBDURAL HEMATOMA EVACUATION;  Surgeon: Marie Bravo MD;  Location: [facility];  Service: Neurosurgery     VITALS:  Vitals:    09/26/19 1131   BP: 112/66   Patient Site: Left Arm   Patient Position: Sitting   Cuff Size: Adult Large   Pulse: 88   SpO2: 96%   Weight: 212 lb 7 oz (96.4 kg)   Height: 5' 10\" (1.778 m)     Wt Readings from Last 3 Encounters:   09/26/19 212 lb 7 oz (96.4 kg)   09/19/19 216 lb 9.6 oz (98.2 kg)   09/12/19 216 lb 7 oz (98.2 kg)     PHYSICAL EXAM:  Constitutional:  In NAD, alert and oriented  Neck: no significant cervical or axillary adenopathy  Cardiac:  S1 S2   Lungs: Clear   Abdomen:   Soft, flat and non-tender.       Psychiatric:  Mood and behavior appropriate, thinking is clear.     DECISION TO OBTAIN OLD RECORDS AND/OR OBTAIN HISTORY FROM SOMEONE OTHER THAN PATIENT, AND/OR ACCESSING CARE EVERYWHERE):  1  0     REVIEW AND SUMMARIZATION OF OLD RECORDS, AND/OR OBTAINING HISTORY FROM SOMEONE OTHER THAN PATIENT, AND/OR DISCUSSION OF CASE WITH ANOTHER HEALTH CARE PROVIDER:  2 reviewed surgery procedure and discharge note.     REVIEW AND/OR ORDER OF OF CLINICAL LAB TESTS: 1  0.    REVIEW " AND/OR ORDER OF RADIOLOGY TESTS: 1 0..    REVIEW AND/OR ORDER OF MEDICAL TESTS (EKG/ECHO/COLONOSCOPY/EGD): 1  0    INDEPENDENT  VISUALIZATION OF IMAGE, TRACING, OR SPECIMEN ITSELF (2 EACH):  0    TOTAL: 2    Current Outpatient Medications   Medication Sig Dispense Refill     acetaminophen (TYLENOL) 325 MG tablet Take 2 tablets (650 mg total) by mouth every 6 (six) hours as needed.  0     amLODIPine (NORVASC) 10 MG tablet Take 1 tablet (10 mg total) by mouth daily. See PCP for refill 30 tablet 5     atorvastatin (LIPITOR) 40 MG tablet Take 1 tablet (40 mg total) by mouth daily. 90 tablet 3     lacosamide 200 mg Tab Take 200 mg by mouth 2 (two) times a day. 60 tablet 0     lamoTRIgine (LAMICTAL) 200 MG tablet tAKE ONE TABLET IN THE MORNING, 1/2 TAB AT LUNCH AND 1 TABLET AT HS       levETIRAcetam (KEPPRA) 500 MG tablet Take 1,500 mg by mouth 3 (three) times a day.       lisinopril (PRINIVIL,ZESTRIL) 10 MG tablet Take 1 tablet (10 mg total) by mouth daily. See PCP for refill 30 tablet 5     metoprolol succinate (TOPROL XL) 100 MG 24 hr tablet Take 1 tablet (100 mg total) by mouth daily. 90 tablet 3     No current facility-administered medications for this visit.      Jasson Abdul MD  Internal Medicine  [facility]

## 2021-06-02 VITALS — WEIGHT: 213.56 LBS | BODY MASS INDEX: 30.64 KG/M2

## 2021-06-02 VITALS — WEIGHT: 215 LBS | BODY MASS INDEX: 30.85 KG/M2

## 2021-06-02 VITALS — WEIGHT: 212 LBS | BODY MASS INDEX: 30.42 KG/M2

## 2021-06-02 VITALS — BODY MASS INDEX: 30.92 KG/M2 | WEIGHT: 215.5 LBS

## 2021-06-02 NOTE — PROGRESS NOTES
MTM Consult Encounter    Jurgen Kirkland is a 59 y.o. male referred for a clinical pharmacist consult from Dr. Abdul for anticoagulation education and adherence.  Jurgen presents today by himself, he is in charge of his own medication administration.  Of note the medication list that he presents with today is from September.  There have been many medication changes since that time, including 2 hospitalizations.  He does fill a pillbox once weekly, with 4 times daily administration.    Discussion: Reviewed both recent hospitalizations, and accompanying consults from neurosurgery, hematology, and cardiology.  He had multiple surgeries for subdural hematoma, in addition to multiple sources of clotting including a DVT in his leg and cardiac thrombus.  He is also off of aspirin at this time, and per cardiology they are awaiting for neurosurgery to okay him to resume this due to CABG in 2018 and ischemic cardiomyopathy with reduced ejection fraction.  He has been following closely with cardiology and they are titrating his beta-blocker.  He did have some confusion about the dose adjustment here, however he has been able to confirm that he did start taking 200 mg daily metoprolol, blood pressure and pulse are within normal limits.  Below are clips from hematology notes and cardiology notes recently.  There is not currently an FDA approved indication for using direct oral anticoagulants in cardiac thrombus, however this would be appropriate for treating his DVT.  He is on lifelong the chronic anticoagulation, and outweighing risks versus benefits and cost of direct oral anticoagulation versus warfarin, per hematology having more control of his anticoagulation and easy reversibility due to recent subdural hematomas, warfarin is likely the preferred regimen at this time.  His INR has been supratherapeutic, managing closely with anticoagulation nurses, therefore at slightly higher risk of bleed at this time.  That this may  "be another reason to suggest benefits of direct oral anticoagulants.  He will also need to get his IVC filter removed within the next month or so, PCP will follow up with interventional radiology regarding this.  He will follow-up with neurosurgery in December.  Additionally discussed with anticoagulation pharmacist, who is agreeable based on long-term anticoagulation and consult from hematology, that warfarin is the safest anticoagulant at this time.      \"Per hematology: He has been on heparin for little over 24 hours and tolerating well without worsening headache or bleeding.  CT scan this morning showed stable.  I discussed with him about very challenging situation with balancing bleeding risks and clotting risks in him in the setting of major thromboses and bleed. Anticoagulation is very important aspect of his acute LE DVT and cardiac thrombus.  If he is stable from neurosurgical standpoint, okay to switch to Lovenox and warfarin, with Lovenox to bridge until INR to therapeutic range of 2-3.  His renal function is adequate for Lovenox.  We discussed about newer oral anticoagulants but I favor Lovenox and warfarin due to easier reversibility and ability to monitor his coagulation status.               The duration of anticoagulation for left lower extremity DVT will be 3 months, however he will need to be followed by cardiology regarding the cardiac thrombus which will need longer duration of anticoagulation.  Once he is stable from anticoagulation standpoint, IVC filter should be removed.\"    \"Cardiology: 10/22/19:   PAST MEDICAL HISTORY   1. coronary artery disease s/p CABG 8/2018  2. ICM with EF 30-35%  3. Hypertension  4. dyslipidemia  5. LV thrombus, on Coumadin x 8 months   6. Spontaneous subdural hematoma 7/2019 while on chronic 81 mg Aspirin + Coumadin   -s/p subdural hematoma evacuation on 8/2/2019  7. DVT 10/3/19 (provoked in setting of recent brain surgery), s/p IVC filter placement, on Coumadin   8. " "CKD III- etiology unclear, baseline creatinine 1.5-2.0  9. Chronic seizure disorder, on meds since 1970's.     IMPRESSION:  1. Intracranial subacute subdural hematoma, spontaneous (no head injury), while on Aspirin and Coumadin. S/p evacuation and subsequent neurosurgery 10/2019, Stable per repeat MRI/CT. Currently back on Coumadin  2. LV thrombus, mural-- on Coumadin Since 9/7/18, no significant improvement per echo 12/2018 (which is atypical), although pt does have reduced LV function, increasing likelihood for persistent or recurrent thrombus. Persustent mural thrombus confirmed on cMRI   3. coronary artery disease s/p CABG 8/2018. No anginal sx. Resume Aspirin as soon as able per neurosurgery. On Beta Blocker and high intensity statin.   4. CMP, ischemic with EF 35%-- on Beta Blocker, Ace Inhibitor- Amlodipine recently added for tighter BP control after brain surgery, will continue to titrate HF meds as able, RN visits every 2 weeks. Monitoring renal function closely if increasing Lisinopril. Discussed ICD, patient not interested.   5. Hypertension- controlled   6. dyslipidemia- on high intensity statin   7. CKD III-IV-- creatinine fluctuating between 1.6 and 2.0 recently. On low-dose Ace Inhibitor, will continue to monitor renal function very closely if titrating     PLAN AND RECOMMENDATIONS:  --increase Metoprolol to 200 mg daily  --RN visit 2 weeks, if BP and HR stable, increase Lisinopril and recheck BMP/RN visit 2 weeks later  --will discuss with neurosurgery re: decreasing or stopping Amlodipine and timeline  --RTC 3 months, sooner if necessary   --not interested IN EP referral for ICD\"    Lab Results   Component Value Date    INR 3.90 (H) 10/29/2019    INR 3.20 (H) 10/22/2019    INR 2.10 (H) 10/14/2019   Warfarin Dosing Instructions:  hold today then change warfarin dose to 5 mg daily   (12.5 % change)    Post Discharge Medication Reconciliation Status: discharge medications reconciled, continue " medications without change    Plan:  1.  Recommend to continue chronic anticoagulation with warfarin, discussed extensively during this visit, provided updated medication list for his wallet    Follow up:   1-2 months with neurosurgery and PCP, as scheduled    Raúl Reyes, PharmD, BCACP  Medication Management (MTM) Pharmacist  [facility]      Current Outpatient Medications   Medication Sig Dispense Refill     acetaminophen (TYLENOL) 325 MG tablet Take 2 tablets (650 mg total) by mouth every 6 (six) hours as needed.  0     amLODIPine (NORVASC) 10 MG tablet Take 1 tablet (10 mg total) by mouth daily. See PCP for refill 30 tablet 5     atorvastatin (LIPITOR) 40 MG tablet Take 1 tablet (40 mg total) by mouth daily. 90 tablet 3     lamoTRIgine (LAMICTAL) 200 MG tablet 1 TABLET IN THE MORNING, 1/2 TAB AT LUNCH AND 1 TABLET AT NIGHT              levETIRAcetam (KEPPRA) 500 MG tablet Take 1,500 mg by mouth 3 (three) times a day.       lisinopril (PRINIVIL,ZESTRIL) 10 MG tablet Take 1 tablet (10 mg total) by mouth daily. See PCP for refill 30 tablet 5     metoprolol succinate (TOPROL-XL) 200 MG 24 hr tablet Take 1 tablet by mouth daily.       warfarin (COUMADIN/JANTOVEN) 5 MG tablet Take 5 mg by mouth daily.  Adjust dose based on INR results as directed. 30 tablet 0     No current facility-administered medications for this visit.

## 2021-06-02 NOTE — PROGRESS NOTES
CHART NOTE     DATE OF SERVICE:  10/17/2019     : 1960   59 y.o.     ASSESSMENT :   Decrease in size of SDH, on coumadin with IVC filter for left femoral DVT. Leg pain has improved in this leg, no new neuro deficits, no seizures recently.       PLAN:   1. Follow up CT in six weeks and appt at nearest trauma clinic  2. Should follow up with Dr. Abdul for the blood clot  3. INR and warfarin per anticoagulation clinic  4. Continue restrictions, no aggressive activity  5. Hematology deferred follow up to PCP      HPI:  Jurgen Kirkland is status post initial subdural hematoma evacuation on  2019.  He has a history of coronary artery disease, seizure disorder, myocardial infarction, CABG, with a history of cardiac mural thrombus on chronic Coumadin therapy.  On initial presentation, he presented with persistent headaches and was found to have a subacute subdural hematoma overlying the right frontal convexity.  He underwent bur hole evacuation, but unfortunately his subdural fluid collection reaccumulated.  He then went for craniotomy to evacuate the subdural hematoma on 2019.  His Coumadin has been held.  This was previously discussed with his cardiologist.  He had been doing quite well after the craniotomy, did suffer one seizure for which his medications were adjusted.  He has not had any seizures since his surgery.  He has had improvement in his headaches, and was doing quite well at home.  Today during his clinic visit, he described some left anterior thigh pain, redness, firm to the touch.  Given he was off of his anticoagulation I sent him for an ultrasound of his lower extremity which returned positive for occlusive femoral DVT.  He was then directly admitted to the hospital for filter placement.  He denies recent anticoagulation use, no aspirin NSAIDs or blood thinners.  The only activity he has been doing for the last few weeks is walking his dog.    Today: Left leg feels better, no  "headaches, no issues. Has been home since discharge.        PAST MEDICAL HISTORY, SURGICAL HISTORY, REVIEW OF SYMPTOMS, MEDICATIONS AND ALLERGIES:  Past medical history, surgical history, ROS, medications and allergies reviewed with patient and remain unchanged from previous visit.    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   Skin: W/D/I. No rashes, lesions or breaks in integrity.   Recent and remote memory intact, fund of knowledge wnl.    Alert and oriented x3, speech fluent and appropriate.   Comprehension intact with 2 step crossover command.   Finger nose finger smooth and accurate  PERRL, EOMI, No nystagmus,   Face symmetric, tongue midline, Uvula midline,  palate rises with phonation   Shoulder shrug equal  Arm strength bilateral grasp, biceps, triceps, and deltoids 5/5   Leg strength bilateral dorsiflexion, plantar flexion, and hip flexion 5/5  No extremity edema noted.    Muscle Bulk and tone wnl.   Gait and station:normal  Incision: CDI without erythema or edema - well healed       RADIOGRAPHIC IMAGING: Films personally reviewed and interpreted.  Also reviewed and discussed with Dr. Bravo.   " Reviewed imaging with patient and family    EXAM: CT HEAD WO CONTRAST  LOCATION: [facility]  DATE/TIME: 10/16/2019 2:55 PM     INDICATION: Follow-up subdural hemorrhage.  COMPARISON: Head CT 10/04/2019.  TECHNIQUE: Routine without IV contrast. Multiplanar reformats. Dose reduction techniques were used.     FINDINGS:  There are changes from a frontoparietal craniotomy and gaurang hole on the right side. Small residual subdural fluid/hemorrhage is present at the site of the craniotomy, measuring 7 mm in width, previously measuring 12 mm. There is some intermediate   attenuation fluid at this site. No new hemorrhage is identified. There is no midline shift. The ventricles and sulci are stable in size. No mass or recent infarction is identified by CT. There is a small area of encephalomalacia in the left parietal   lobe. The globes and orbits are unremarkable. The imaged portions of the paranasal sinuses and mastoid air cells are clear.     IMPRESSION:   1. Further reduction in the size of the small subdural hemorrhage on the right side at the site of the craniotomy.  2. No new hemorrhage. No significant mass effect.    [facility]  O: [phone]  P: [phone]

## 2021-06-02 NOTE — TELEPHONE ENCOUNTER
PATIENT NAME:  Jurgen Kirkland  YOB: 1960  MRN: [id number]  SURGEON: DR. BRAVO  DATE of CONSULT:  10-2-19  DIAGNOSIS:  direct admission from neurosurgery clinic for treatment of an occlusive left femoral vein DVT  HX EPILEPSY AND RECURRENT SEIZURES  R craniotomy for recurrent subdural on 9/13/19 after previously undergoing R gaurang holes with Dr Bravo on 8/2/19. He had been off of his coumadin and asa therapy since subdural was discovered on 7/17/19. He has continued keppra since last hospital discharge as well as his baseline lamictal for seizure. Headaches improving.     FOLLOW-UP:  Post Op Visit: 2 weeks   Post-op Provider: NP  DIAGNOSTICS:  radiology: CT scan: HEAD WITHOUT  (ORDERED 10-7-19)  DISPOSITION:  HOME 10-5-19    ADDITIONAL INSTRUCTIONS FOR MEDICAL STAFF:    He was started on low dose heparin and placement of IVC filter. He is at risk of developing worsening subdural with any anticoagulation. Continue Keppra 1500mg three times a day as well as his baseline Lamictal for seizure prophylaxis.

## 2021-06-02 NOTE — PROGRESS NOTES
ASSESSMENT AND PLAN:    1. Acute deep vein thrombosis of left femoral vein   This developed as he was off anticoagulation due to his recent craniotomy, no back on warfarin. Has filter in place, placed 10/3/19.  On warfarin and lovenox bridging.  INR yesterday 1.8.  Advised to stop lovenox. Has INR in three days with neurosurgery.     2. Warfarin anticoagulation  Ongoing INR monitoring.     5. Seizure disorder  No seizures, on medications.     Post Discharge Medication Reconciliation Status: discharge medications reconciled and changed, per note/orders (see AVS)  - stop lovenox continue warfarin.     Patient Instructions   1. Stop lovenox    2. Follow up with neurosurgery as planned.     3. Follow up December 3 as scheduled.      CHIEF COMPLAINT:  Chief Complaint   Patient presents with     Follow-up     DOD 10/05/19     Immunizations     flu shot     HISTORY OF PRESENT ILLNESS:  Jurgen Kirkland is a 59 y.o. male here in follow up.  Developed inner left thigh DVT when off warfarin after craniotomy for SDH.  Had filter placed.  Now on warfarin and lovenox bridging.  INR 1.8 yesterday.  No symptoms of bleeding, or headache, or nausea.      REVIEW OF SYSTEMS:   See HPI, all other systems on review are negative.    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; 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; burrholes-seizures     Warfarin anticoagulation      Social History     Tobacco Use  "  Smoking Status Never Smoker   Smokeless Tobacco Never Used     Family History   Problem Relation Age of Onset     Heart disease Mother      Diabetes Mother      Heart disease Sister      Heart disease Father      Past Surgical History:   Procedure Laterality Date     BRIAN HOLE OF CRANIUM Right 8/2/2019    Procedure: RIGHT BRIAN HOLE CREATION FOR EVACUATION OF SUBDURAL HEMATOMA;  Surgeon: Marie Bravo MD;  Location: [facility];  Service: Neurosurgery     COLONOSCOPY N/A 2010    normal-q 10 yrs     CORONARY ARTERY BYPASS GRAFT      9/18 [facility].      CRANIOTOMY N/A 1960    infant with TBI/neurosurgical intervention     CRANIOTOMY Right 9/13/2019    Procedure: RIGHT CRANIOTOMY FOR EVACUATION SUBDURAL HEMATOMA EVACUATION;  Surgeon: Marie Bravo MD;  Location: [facility];  Service: Neurosurgery     IR IVC FILTER PLACEMENT  10/3/2019     VITALS:  Vitals:    10/10/19 1425   BP: 118/66   Patient Site: Left Arm   Patient Position: Sitting   Cuff Size: Adult Regular   Pulse: 66   SpO2: 99%   Weight: 219 lb (99.3 kg)   Height: 5' 10\" (1.778 m)     Wt Readings from Last 3 Encounters:   10/10/19 219 lb (99.3 kg)   10/03/19 214 lb 3.2 oz (97.2 kg)   09/26/19 212 lb 7 oz (96.4 kg)     PHYSICAL EXAM:  Constitutional:  In NAD, alert and oriented  Cardiac:  S1 S2  Lungs: Clear  Abdomen:   Soft, flat and non-tender      DECISION TO OBTAIN OLD RECORDS AND/OR OBTAIN HISTORY FROM SOMEONE OTHER THAN PATIENT, AND/OR ACCESSING CARE EVERYWHERE):  1  0    REVIEW AND SUMMARIZATION OF OLD RECORDS, AND/OR OBTAINING HISTORY FROM SOMEONE OTHER THAN PATIENT, AND/OR DISCUSSION OF CASE WITH ANOTHER HEALTH CARE PROVIDER:  2 reviewed recent neurosurgery note    REVIEW AND/OR ORDER OF OF CLINICAL LAB TESTS: 1  Reviewed recent INR.    REVIEW AND/OR ORDER OF RADIOLOGY TESTS: 1 0.    REVIEW AND/OR ORDER OF MEDICAL TESTS (EKG/ECHO/COLONOSCOPY/EGD): 1 0    INDEPENDENT  VISUALIZATION OF IMAGE, TRACING, OR SPECIMEN ITSELF " (2 EACH): 0    TOTAL: 3    Current Outpatient Medications   Medication Sig Dispense Refill     acetaminophen (TYLENOL) 325 MG tablet Take 2 tablets (650 mg total) by mouth every 6 (six) hours as needed.  0     amLODIPine (NORVASC) 10 MG tablet Take 1 tablet (10 mg total) by mouth daily. See PCP for refill 30 tablet 5     atorvastatin (LIPITOR) 40 MG tablet Take 1 tablet (40 mg total) by mouth daily. 90 tablet 3     lamoTRIgine (LAMICTAL) 200 MG tablet tAKE ONE TABLET IN THE MORNING, 1/2 TAB AT LUNCH AND 1 TABLET AT HS       levETIRAcetam (KEPPRA) 500 MG tablet Take 1,500 mg by mouth 3 (three) times a day.       lisinopril (PRINIVIL,ZESTRIL) 10 MG tablet Take 1 tablet (10 mg total) by mouth daily. See PCP for refill 30 tablet 5     metoprolol succinate (TOPROL XL) 100 MG 24 hr tablet Take 1 tablet (100 mg total) by mouth daily. 90 tablet 3     warfarin (COUMADIN/JANTOVEN) 5 MG tablet Take 5 mg by mouth daily.  Adjust dose based on INR results as directed. 30 tablet 0     Jasson Abdul MD  Internal Medicine  [facility]

## 2021-06-02 NOTE — PATIENT INSTRUCTIONS - HE
1. Continue warfarin  Indefinitely.     2. IVC filter to be removed in 2 months.      3. Consider changes to xarelto or eliquis for long term anticoagulation.      4. Follow up as scheduled in December.      5. Amb referral to pharmacy review for possible eliquis or xarelto.     6. cologuard will be sent.

## 2021-06-02 NOTE — PATIENT INSTRUCTIONS - HE
Continue restrictions, no strenous activity. Call with concerning symptoms - like headache, worsening seizures, new weakness/numbness.     Call [phone] with the following symptoms:    *Worsening pain not relieved by the  prescription given  *Worsening headache not relieved by the prescription given  *A headache that gets better or worse when you stand up or lie down  *Seizures  *Persistent nausea and vomiting  *Increased sleepiness or difficulty being awakened  *Change in ability to walk, see, think, or talk  *Worsening or new onset of weakness, or numbness and tingling      !!!! IF YOU HAVE A SERIOUS OR THREATENING EMERGENCY, CALL 911 OR COME TO THE EMERGENCY ROOM.  IF YOUR CONDITIONS ALLOWS COME TO THE HOSPITAL WHERE YOUR SURGERY WAS PERFORMED.    QUESTIONS OR CONCERNS:   OUR OFFICE HOURS ARE FROM 9:00 A.M -4:30 P.M. MONDAY-FRIDAY.  AFTER OFFICE HOURS, CALLS ARE ANSWERED BY THE ON-CALL PROVIDER. PLEASE CALL WITH ROUTINE QUESTIONS DURING OFFICE HOURS. THE ON-CALL PROVIDER WILL NOT REFILL PRESCRIPTIONS.

## 2021-06-02 NOTE — PROGRESS NOTES
ASSESSMENT AND PLAN:    1. Screen for colon cancer  Given his chronic anticoagulation needs, will do cologuard.    - Cologuard    2. Warfarin anticoagulation  Ongoing.  He may qualify for apixiban or rivaroxiban, given his DVT post neurosurgery.  His original indication was CAD and mural thrombus. Anticoagulation need is lifelong, at this time.    - Ambulatory referral to Medication Management    3. SDH (subdural hematoma) (H)  Stable post craniotomy.  He has seen neurosurgery and post operative course is satisfactory.      4. Apical mural thrombus  Post NSTEMI.    5. IVC filter  This should be removed.  Will contact IR for timing.     6. LLE DVT  No need for further imaging of DVT. Now on therapeutic anticoagulation.     Patient Instructions   1. Continue warfarin  Indefinitely.     2. IVC filter to be removed as indicated by IR.       3. Consider changes to xarelto or eliquis for long term anticoagulation.      4. Follow up as scheduled in December.      5. Amb referral to pharmacy review for possible eliquis or xarelto.     6. cologuard will be sent.      CHIEF COMPLAINT:  Chief Complaint   Patient presents with     Follow-up     Colon Cancer Screening     patient due, orders pending     HISTORY OF PRESENT ILLNESS:  Jurgen Kirkland is a 59 y.o. male is here in follow up.  He feels well.  Given good follow up assessment by neurosurgery.  LLE DVT is improving, he is on warfarin and bridging has stopped.  No unusual dyspnea, or chest pain, or any symptoms of bleeding.     REVIEW OF SYSTEMS:   See HPI, all other systems on review are negative.    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; 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; burrholes-seizures     Warfarin anticoagulation      Social History     Tobacco Use   Smoking Status Never Smoker   Smokeless Tobacco Never Used     Family History   Problem Relation Age of Onset     Heart disease Mother      Diabetes Mother      Heart disease Sister      Heart disease Father      Past Surgical History:   Procedure Laterality Date     BRIAN HOLE OF CRANIUM Right 8/2/2019    Procedure: RIGHT BRIAN HOLE CREATION FOR EVACUATION OF SUBDURAL HEMATOMA;  Surgeon: Marie Bravo MD;  Location: [facility];  Service: Neurosurgery     COLONOSCOPY N/A 2010    normal-q 10 yrs     CORONARY ARTERY BYPASS GRAFT      9/18 [facility].      CRANIOTOMY N/A 1960    infant with TBI/neurosurgical intervention     CRANIOTOMY Right 9/13/2019    Procedure: RIGHT CRANIOTOMY FOR EVACUATION SUBDURAL HEMATOMA EVACUATION;  Surgeon: Marie Bravo MD;  Location: [facility];  Service: Neurosurgery     IR IVC FILTER PLACEMENT  10/3/2019     VITALS:  Vitals:    10/22/19 1151   BP: 98/64   Patient Site: Left Arm   Patient Position: Sitting   Cuff Size: Adult Regular   Pulse: 60   SpO2: 97%   Weight: 216 lb (98 kg)   Height: 5' 10\" (1.778 m)     Wt Readings from Last 3 Encounters:   10/22/19 216 lb (98 kg)   10/17/19 219 lb (99.3 kg)   10/10/19 219 lb (99.3 kg)     PHYSICAL EXAM:  Constitutional:  In NAD, alert and oriented  Head:  Incision is well healed and intact.     DECISION TO OBTAIN OLD RECORDS AND/OR OBTAIN HISTORY FROM SOMEONE OTHER THAN PATIENT, AND/OR ACCESSING CARE EVERYWHERE):  1      REVIEW AND SUMMARIZATION OF OLD RECORDS, AND/OR OBTAINING HISTORY FROM SOMEONE OTHER THAN PATIENT, AND/OR DISCUSSION OF CASE WITH ANOTHER HEALTH CARE " PROVIDER:  2 reviewed recent neurosurgery evaluation    REVIEW AND/OR ORDER OF OF CLINICAL LAB TESTS: 1  INR    REVIEW AND/OR ORDER OF RADIOLOGY TESTS: 1 0.    REVIEW AND/OR ORDER OF MEDICAL TESTS (EKG/ECHO/COLONOSCOPY/EGD): 1  0    INDEPENDENT  VISUALIZATION OF IMAGE, TRACING, OR SPECIMEN ITSELF (2 EACH):  0    TOTAL: 3    Current Outpatient Medications   Medication Sig Dispense Refill     acetaminophen (TYLENOL) 325 MG tablet Take 2 tablets (650 mg total) by mouth every 6 (six) hours as needed.  0     amLODIPine (NORVASC) 10 MG tablet Take 1 tablet (10 mg total) by mouth daily. See PCP for refill 30 tablet 5     atorvastatin (LIPITOR) 40 MG tablet Take 1 tablet (40 mg total) by mouth daily. 90 tablet 3     lamoTRIgine (LAMICTAL) 200 MG tablet tAKE ONE TABLET IN THE MORNING, 1/2 TAB AT LUNCH AND 1 TABLET AT HS       levETIRAcetam (KEPPRA) 500 MG tablet Take 1,500 mg by mouth 3 (three) times a day.       lisinopril (PRINIVIL,ZESTRIL) 10 MG tablet Take 1 tablet (10 mg total) by mouth daily. See PCP for refill 30 tablet 5     metoprolol succinate (TOPROL XL) 100 MG 24 hr tablet Take 1 tablet (100 mg total) by mouth daily. 90 tablet 3     warfarin (COUMADIN/JANTOVEN) 5 MG tablet Take 5 mg by mouth daily.  Adjust dose based on INR results as directed. 30 tablet 0     No current facility-administered medications for this visit.      Jasson Abdul MD  Internal Medicine  [facility]

## 2021-06-02 NOTE — PATIENT INSTRUCTIONS - HE
1. Stop lovenox    2. Follow up with neurosurgery as planned.     3. Follow up December 3 as scheduled.

## 2021-06-02 NOTE — PATIENT INSTRUCTIONS - HE
Take new metoprolol dose, full tablet once daily in the morning, 200mg (no need to cut)     We should continue on warfarin for now, we can try to discuss alternatives with the specialists, however warfarin is preferred due to safety factors

## 2021-06-03 ENCOUNTER — AMBULATORY - HEALTHEAST (OUTPATIENT)
Dept: LAB | Facility: CLINIC | Age: 61
End: 2021-06-03

## 2021-06-03 ENCOUNTER — COMMUNICATION - HEALTHEAST (OUTPATIENT)
Dept: ANTICOAGULATION | Facility: CLINIC | Age: 61
End: 2021-06-03

## 2021-06-03 VITALS — HEIGHT: 69 IN | BODY MASS INDEX: 31.24 KG/M2 | WEIGHT: 210.9 LBS

## 2021-06-03 VITALS
WEIGHT: 219 LBS | HEIGHT: 70 IN | HEART RATE: 66 BPM | BODY MASS INDEX: 31.35 KG/M2 | SYSTOLIC BLOOD PRESSURE: 118 MMHG | DIASTOLIC BLOOD PRESSURE: 66 MMHG | OXYGEN SATURATION: 99 %

## 2021-06-03 VITALS
HEIGHT: 70 IN | SYSTOLIC BLOOD PRESSURE: 98 MMHG | HEART RATE: 60 BPM | BODY MASS INDEX: 30.92 KG/M2 | OXYGEN SATURATION: 97 % | DIASTOLIC BLOOD PRESSURE: 64 MMHG | WEIGHT: 216 LBS

## 2021-06-03 VITALS — WEIGHT: 218.5 LBS | HEIGHT: 70 IN | BODY MASS INDEX: 31.28 KG/M2

## 2021-06-03 VITALS — WEIGHT: 216.2 LBS | HEIGHT: 69 IN | BODY MASS INDEX: 32.02 KG/M2

## 2021-06-03 VITALS
OXYGEN SATURATION: 95 % | BODY MASS INDEX: 31.35 KG/M2 | HEIGHT: 70 IN | DIASTOLIC BLOOD PRESSURE: 87 MMHG | SYSTOLIC BLOOD PRESSURE: 136 MMHG | WEIGHT: 219 LBS | HEART RATE: 60 BPM

## 2021-06-03 VITALS — HEIGHT: 70 IN | WEIGHT: 214 LBS | BODY MASS INDEX: 30.64 KG/M2

## 2021-06-03 VITALS — WEIGHT: 220 LBS | HEIGHT: 70 IN | BODY MASS INDEX: 31.5 KG/M2

## 2021-06-03 VITALS — BODY MASS INDEX: 31.01 KG/M2 | WEIGHT: 216.6 LBS | HEIGHT: 70 IN

## 2021-06-03 VITALS — HEIGHT: 69 IN | BODY MASS INDEX: 31.1 KG/M2 | WEIGHT: 210 LBS

## 2021-06-03 VITALS — WEIGHT: 220 LBS | BODY MASS INDEX: 31.5 KG/M2 | HEIGHT: 70 IN

## 2021-06-03 VITALS
WEIGHT: 212.44 LBS | HEART RATE: 88 BPM | BODY MASS INDEX: 30.41 KG/M2 | OXYGEN SATURATION: 96 % | DIASTOLIC BLOOD PRESSURE: 66 MMHG | SYSTOLIC BLOOD PRESSURE: 112 MMHG | HEIGHT: 70 IN

## 2021-06-03 VITALS
DIASTOLIC BLOOD PRESSURE: 64 MMHG | WEIGHT: 216 LBS | SYSTOLIC BLOOD PRESSURE: 116 MMHG | HEART RATE: 60 BPM | BODY MASS INDEX: 30.99 KG/M2

## 2021-06-03 VITALS — BODY MASS INDEX: 31.1 KG/M2 | HEIGHT: 69 IN | WEIGHT: 210 LBS

## 2021-06-03 VITALS
DIASTOLIC BLOOD PRESSURE: 86 MMHG | WEIGHT: 216.44 LBS | HEART RATE: 56 BPM | HEIGHT: 69 IN | SYSTOLIC BLOOD PRESSURE: 132 MMHG | BODY MASS INDEX: 32.06 KG/M2 | OXYGEN SATURATION: 98 %

## 2021-06-03 VITALS — WEIGHT: 215 LBS | BODY MASS INDEX: 31.84 KG/M2 | HEIGHT: 69 IN

## 2021-06-03 VITALS — HEIGHT: 70 IN | WEIGHT: 218.7 LBS | BODY MASS INDEX: 31.31 KG/M2

## 2021-06-03 DIAGNOSIS — I51.3 APICAL MURAL THROMBUS: ICD-10-CM

## 2021-06-03 DIAGNOSIS — I82.412 ACUTE DEEP VEIN THROMBOSIS OF LEFT FEMORAL VEIN (H): ICD-10-CM

## 2021-06-03 DIAGNOSIS — I25.10 CAD, MULTIPLE VESSEL: ICD-10-CM

## 2021-06-03 LAB — INR PPP: 1.8 (ref 0.9–1.1)

## 2021-06-03 NOTE — TELEPHONE ENCOUNTER
ORDER FOR DIAGNOSTIC COLONOSCOPY WAS PLACED ON 11/14/2019  IN THE [facility] PORTAL - THEY WILL CONTACT PATIENT FOR APPT

## 2021-06-03 NOTE — PATIENT INSTRUCTIONS - HE
1. No warfarin starting 12/14, and hold until the day after colonoscopy, 12/19 at the present dose.     2. Lovenox blood thinner shots 100mg twice daily on 12/15 and 12/16 and once daily on 12/17.  Then start lovenox shots twice daily on 12/19, 12/20, and once daily on 12/21.

## 2021-06-03 NOTE — PROGRESS NOTES
ASSESSMENT AND PLAN:    1. Positive colorectal cancer screening using Cologuard test  He is due to have colonoscopy.  Will stop warfarin 5 days prior, and bridge as below instructed.   - enoxaparin (LOVENOX) 120 mg/0.8 mL syringe; Inject 0.67 mL (100 mg total) under the skin see administration instructions. 100mg sq bid 1215, and 12/16 and qd 12/17, and then bid 12/19, 12/20, and qd 12/21.  Dispense: 10 Syringe; Refill: 0    2. SDH (subdural hematoma)  Stable, refilled.    See PCP for refill    3. hypertension  His amlodipine was decreased to 5 mg po daily.  Isosorbide added.  I agree.   - isosorbide mononitrate (IMDUR) 30 MG 24 hr tablet; Take 1 tablet (30 mg total) by mouth daily.  Dispense: 30 tablet; Refill: 11  - lisinopril (PRINIVIL,ZESTRIL) 10 MG tablet; Take 2 tablets (20 mg total) by mouth daily.    Patient Instructions   1. No warfarin starting 12/14, and hold until the day after colonoscopy, 12/19 at the present dose.     2. Lovenox blood thinner shots 100mg twice daily on 12/15 and 12/16 and once daily on 12/17.  Then start lovenox shots twice daily on 12/19, 12/20, and once daily on 12/21.           CHIEF COMPLAINT:  Chief Complaint   Patient presents with     Follow-up     6 mo     HISTORY OF PRESENT ILLNESS:  Jurgen Kirkland is a 59 y.o. male here in follow up. [facility] and isosorbide 30 mg daily was added, and amlodipine was reduced to 5 mg po daily.  He feels well.  Recent positive cologuard.  INR has been therapeutic.     REVIEW OF SYSTEMS:   See HPI, all other systems on review are negative.    Past Medical History:   Diagnosis Date     Alcohol abuse 1990    Remote     Chronic kidney disease      CKD (chronic kidney disease) stage 3, GFR 30-59 ml/min (H)      Compression of brain (H)      Coronary artery disease      Epilepsy (H)     with recurrent seizures     Family history of alcohol abuse      Headache      Hyperlipidemia      Hypertension      Ischemic  "cardiomyopathy      Midline shift of brain      Mural thrombus of heart      Myocardial infarction (H)      Positive colorectal cancer screening using Cologuard test 12/3/2019     Sebaceous cyst      Seizures (H)     Grand Mal     Subacute subdural hematoma (H)      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; 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; burrholes-seizures     Warfarin anticoagulation      Social History     Tobacco Use   Smoking Status Never Smoker   Smokeless Tobacco Never Used     Family History   Problem Relation Age of Onset     Heart disease Mother      Diabetes Mother      Heart disease Sister      Heart disease Father      Past Surgical History:   Procedure Laterality Date     BRIAN HOLE OF CRANIUM Right 8/2/2019    Procedure: RIGHT BRIAN HOLE CREATION FOR EVACUATION OF SUBDURAL HEMATOMA;  Surgeon: Marie Bravo MD;  Location: [facility];  Service: Neurosurgery     COLONOSCOPY N/A 2010    normal-q 10 yrs     CORONARY ARTERY BYPASS GRAFT      9/18 [facility].      CRANIOTOMY N/A 1960    infant with TBI/neurosurgical intervention     CRANIOTOMY Right 9/13/2019    Procedure: RIGHT CRANIOTOMY FOR EVACUATION SUBDURAL HEMATOMA EVACUATION;  Surgeon: Marie Bravo MD;  Location: [facility];  Service: Neurosurgery     IR IVC FILTER PLACEMENT  10/3/2019     VITALS:  Vitals:    12/03/19 1014   BP: 128/74   Patient Site: Left Arm   Patient Position: Sitting   Cuff Size: Adult Large   Pulse: (!) 56   Resp: 16   SpO2: 98%   Weight: 218 lb 9 oz (99.1 kg)   Height: 5' 10\" (1.778 m)     Wt Readings from Last 3 Encounters:   12/03/19 218 lb 9 oz (99.1 kg)   10/30/19 216 lb (98 kg)   10/22/19 216 lb (98 kg)     PHYSICAL EXAM:  Constitutional:  In NAD, alert and oriented  Cardiac:  S1 S2   Lungs: Clear   Neurologic:  Speech clear, no arm or leg weakness, gait normal     Psychiatric:  Mood and behavior appropriate, thinking is clear.     DECISION TO OBTAIN OLD RECORDS AND/OR OBTAIN HISTORY FROM SOMEONE " OTHER THAN PATIENT, AND/OR ACCESSING CARE EVERYWHERE):  1  Reviewed [facility] evaluation     REVIEW AND SUMMARIZATION OF OLD RECORDS, AND/OR OBTAINING HISTORY FROM SOMEONE OTHER THAN PATIENT, AND/OR DISCUSSION OF CASE WITH ANOTHER HEALTH CARE PROVIDER:  2 0    REVIEW AND/OR ORDER OF OF CLINICAL LAB TESTS: 1  cologuard    REVIEW AND/OR ORDER OF RADIOLOGY TESTS: 1 0.    REVIEW AND/OR ORDER OF MEDICAL TESTS (EKG/ECHO/COLONOSCOPY/EGD): 1  0    INDEPENDENT  VISUALIZATION OF IMAGE, TRACING, OR SPECIMEN ITSELF (2 EACH): 0    TOTAL: 2    Current Outpatient Medications   Medication Sig Dispense Refill     acetaminophen (TYLENOL) 325 MG tablet Take 2 tablets (650 mg total) by mouth every 6 (six) hours as needed.  0     amLODIPine (NORVASC) 10 MG tablet Take 1 tablet (10 mg total) by mouth daily. See PCP for refill 30 tablet 5     atorvastatin (LIPITOR) 40 MG tablet Take 1 tablet (40 mg total) by mouth daily. 90 tablet 3     lamoTRIgine (LAMICTAL) 200 MG tablet 1 TABLET IN THE MORNING, 1/2 TAB AT LUNCH AND 1 TABLET AT NIGHT              levETIRAcetam (KEPPRA) 500 MG tablet Take 1,500 mg by mouth 3 (three) times a day.       lisinopril (PRINIVIL,ZESTRIL) 10 MG tablet Take 2 tablets (20 mg total) by mouth daily. See PCP for refill       metoprolol succinate (TOPROL-XL) 200 MG 24 hr tablet Take 1 tablet by mouth daily.       warfarin (COUMADIN/JANTOVEN) 5 MG tablet Take 5 mg by mouth daily.  Adjust dose based on INR results as directed. 30 tablet 0     enoxaparin (LOVENOX) 120 mg/0.8 mL syringe Inject 0.67 mL (100 mg total) under the skin see administration instructions. 100mg sq bid 1215, and 12/16 and qd 12/17, and then bid 12/19, 12/20, and qd 12/21. 10 Syringe 0     isosorbide mononitrate (IMDUR) 30 MG 24 hr tablet Take 1 tablet (30 mg total) by mouth daily. 30 tablet 11     No current facility-administered medications for this visit.      Jasson Abdul MD  Internal Medicine  [facility]

## 2021-06-03 NOTE — TELEPHONE ENCOUNTER
LMTCB.  Please relay the following:     Pt's cologuard test came back positive. This means abnormal dna was found in the stool.  Recommendation is to have a diagnostic colonoscopy for further screening.  PCP will order colonoscopy for pt.

## 2021-06-03 NOTE — TELEPHONE ENCOUNTER
Patient Returning Call  Reason for call:  results  Information relayed to patient: The writer read the following to patient per LPN: Pt's cologuard test came back positive. This means abnormal dna was found in the stool.  Recommendation is to have a diagnostic colonoscopy for further screening.  PCP will order colonoscopy for pt.   Patient has additional questions:  Yes  If YES, what are your questions/concerns:  Please place referral for colonoscopy .  Patient in agreement with plan.   Okay to leave a detailed message?: Yes

## 2021-06-04 VITALS
WEIGHT: 222.13 LBS | OXYGEN SATURATION: 94 % | DIASTOLIC BLOOD PRESSURE: 76 MMHG | HEIGHT: 70 IN | HEART RATE: 54 BPM | SYSTOLIC BLOOD PRESSURE: 120 MMHG | BODY MASS INDEX: 31.8 KG/M2

## 2021-06-04 VITALS
HEIGHT: 70 IN | WEIGHT: 218 LBS | DIASTOLIC BLOOD PRESSURE: 65 MMHG | BODY MASS INDEX: 31.21 KG/M2 | SYSTOLIC BLOOD PRESSURE: 112 MMHG | HEART RATE: 58 BPM | OXYGEN SATURATION: 98 %

## 2021-06-04 VITALS
DIASTOLIC BLOOD PRESSURE: 80 MMHG | HEIGHT: 70 IN | HEART RATE: 88 BPM | SYSTOLIC BLOOD PRESSURE: 120 MMHG | OXYGEN SATURATION: 98 % | BODY MASS INDEX: 30.78 KG/M2 | WEIGHT: 215 LBS

## 2021-06-04 VITALS
SYSTOLIC BLOOD PRESSURE: 132 MMHG | DIASTOLIC BLOOD PRESSURE: 84 MMHG | HEART RATE: 58 BPM | OXYGEN SATURATION: 98 % | HEIGHT: 70 IN | BODY MASS INDEX: 32.35 KG/M2 | WEIGHT: 226 LBS

## 2021-06-04 VITALS
BODY MASS INDEX: 30.49 KG/M2 | TEMPERATURE: 98.5 F | HEART RATE: 73 BPM | OXYGEN SATURATION: 95 % | RESPIRATION RATE: 24 BRPM | DIASTOLIC BLOOD PRESSURE: 79 MMHG | WEIGHT: 212.5 LBS | SYSTOLIC BLOOD PRESSURE: 158 MMHG

## 2021-06-04 VITALS
WEIGHT: 218.56 LBS | HEART RATE: 56 BPM | HEIGHT: 70 IN | BODY MASS INDEX: 31.29 KG/M2 | OXYGEN SATURATION: 98 % | DIASTOLIC BLOOD PRESSURE: 74 MMHG | SYSTOLIC BLOOD PRESSURE: 128 MMHG | RESPIRATION RATE: 16 BRPM

## 2021-06-04 NOTE — TELEPHONE ENCOUNTER
He is to stop warfarin on 12/14 for colonoscopy 12/18.  He will take lovenox 100mg sq two times a day on 12/15.12/16 and once in AM on 12/17.  None on 12/18.  On 12/19 he will restart warfarin at usual dose, if OK with GI, AND take lovenox 100 mg sq two times a day on 12/19 and 12/20 and once on 12/2.  Dr. Franko MD

## 2021-06-04 NOTE — TELEPHONE ENCOUNTER
Caller is requesting for bridge prescription for warfarin hold   Orders being requested: [facility] for Warfarin for four days prior to procedure .   Reason service is needed/diagnosis: Colonoscopy procedure on 12/18/19  When are orders needed by: As soon as Possible.  Where to send Orders: Fax # [phone]  Okay to leave detailed message?  No

## 2021-06-04 NOTE — PROGRESS NOTES
CHART NOTE     1960     DATE OF SERVICE: 12/5/2019     FOLLOW UP EVALUATION:      ASSESSMENT :Jurgen Kirkland is status post initial subdural hematoma evacuation on  8/2/2019.  He has a history of coronary artery disease, seizure disorder, myocardial infarction, CABG, with a history of cardiac mural thrombus on chronic Coumadin therapy.  On initial presentation, he presented with persistent headaches and was found to have a subacute subdural hematoma overlying the right frontal convexity.  He underwent bur hole evacuation, but unfortunately his subdural fluid collection reaccumulated.  He then went for craniotomy to evacuate the subdural hematoma on September 13, 2019.  His Coumadin has been held.  This was previously discussed with his cardiologist.  He had been doing quite well after the craniotomy, did suffer one seizure for which his medications were adjusted.  He has not had any seizures since his surgery.  He has had improvement in his headaches, and was doing quite well at home.  During his last clinic visit, he described  left anterior thigh pain, redness, firm to the touch.  Given he was off of his anticoagulation I sent him for an ultrasound of his lower extremity which returned positive for occlusive femoral DVT.  He was then directly admitted to the hospital for filter placement.  Since he was last seen on 10/17/2019, reports doing well no headache--DVT resolved.  He's on Coumadin.  The SDH continues to decrease in size.      RADIOGRAPHIC IMAGING Decreased size of the subdural hematoma overlying the anterior right cerebral convexity. Small amount of apparent hypoattenuation overlying the anterior right frontal convexity is favored to be due to beam hardening artifact although small amount of acute blood products is difficult to exclude. Consider short interval follow-up to document stability.  2.  No definite acute intracranial abnormality.:     PLAN: Due to questionable findings on imaging and  that he's anticoagulated-- plan short suspense follow up--follow up 2 weeks w repeat head CT-sooner if develops symptoms    PAST MEDICAL HISTORY, SURGICAL HISTORY, REVIEW OF SYMPTOMS, MEDICATIONS AND ALLERGIES:  Past medical history, surgical history, review of symptoms, medications and allergies remain unchanged.    Vitals:    12/05/19 1245   BP: 112/65   Pulse: (!) 58   SpO2: 98%        PHYSICAL EXAM:  Neurological exam reveals:  Alert and oriented x3, speech fluent and appropriate.   PERRL, EOMI, face symmetric, tongue midline, shoulder shrug equal  No pronator drift, finger to nose smooth and accurate bilaterally  Arm strength bilateral grasp, biceps, triceps, and deltoids 5/5   Leg strength bilateral dorsiflexion, plantar flexion, and hip flexion 5/5  Muscle Bulk and tone wnl.   Reflexes:No pathological reflexes   Gait and station:Normal

## 2021-06-05 VITALS
DIASTOLIC BLOOD PRESSURE: 70 MMHG | HEIGHT: 70 IN | HEART RATE: 54 BPM | OXYGEN SATURATION: 96 % | SYSTOLIC BLOOD PRESSURE: 120 MMHG | BODY MASS INDEX: 32.07 KG/M2 | WEIGHT: 224 LBS

## 2021-06-05 NOTE — PROGRESS NOTES
Patient is post op gaurang holes with Dr jiménez on 8/2/19. Here for staple removal today.  Today, Jurgen PARIS Marita reports that the symptoms are improved from pre-op.    Surgical wound WNL - CDI, no signs of infection or skin breakdown.  Incision well-healed: good skin approximation, no redness or visible/palpable edema, no tenderness to palpation.  PT. AF, denies fever, chills or sweats.  Pt. reports that the symptoms are improved from pre-op.    Staples- intact removed without difficulty. Wound prepped with Betadine before and after removal.  Surrounding skin has no signs of breakdown.  Verbal instructions regarding incision care are given.  Pt. advised to call us if any s/s of infection noted - all discussed in details.      Suture removal- sutures intact removed without difficulty. Wound prepped with Betadine before and after removal.  Surrounding skin has no signs of breakdown.  Verbal instructions regarding incision care are given.  Pt. advised to call us if any s/s of infection noted - all discussed in details.  Arely Villeda, CMA

## 2021-06-05 NOTE — PROGRESS NOTES
CHART NOTE     DATE OF SERVICE:  2020     : 1960   59 y.o.     ASSESSMENT :   Resolved SDH on head CT.       PLAN:   1. No further follow up with neurosurgery  2. Okay for meds as needed  3. Activity as tolerated.    HPI:  Jurgen Kirkland is status post initial subdural hematoma evacuation on  2019.  He has a history of coronary artery disease, seizure disorder, myocardial infarction, CABG, with a history of cardiac mural thrombus on chronic Coumadin therapy.  On initial presentation, he presented with persistent headaches and was found to have a subacute subdural hematoma overlying the right frontal convexity.  He underwent bur hole evacuation, but unfortunately his subdural fluid collection reaccumulated.  He then went for craniotomy to evacuate the subdural hematoma on 2019.  His Coumadin has been held.  This was previously discussed with his cardiologist.  He had been doing quite well after the craniotomy, did suffer one seizure for which his medications were adjusted.  He has not had any seizures since his surgery.  He has had improvement in his headaches, and was doing quite well at home.  Today during his clinic visit, he described some left anterior thigh pain, redness, firm to the touch.  Given he was off of his anticoagulation I sent him for an ultrasound of his lower extremity which returned positive for occlusive femoral DVT.  He was then directly admitted to the hospital for filter placement.  He denies recent anticoagulation use, no aspirin NSAIDs or blood thinners.  The only activity he has been doing for the last few weeks is walking his dog.    Today: No issues       PAST MEDICAL HISTORY, SURGICAL HISTORY, REVIEW OF SYMPTOMS, MEDICATIONS AND ALLERGIES:  Past medical history, surgical history, ROS, medications and allergies reviewed with patient and remain unchanged from previous visit.    Past Medical History:   Diagnosis Date     Alcohol abuse     Remote      "Chronic kidney disease      CKD (chronic kidney disease) stage 3, GFR 30-59 ml/min (H)      Compression of brain (H)      Coronary artery disease      Epilepsy (H)     with recurrent seizures     Family history of alcohol abuse      Headache      Hyperlipidemia      Hypertension      Ischemic cardiomyopathy      Midline shift of brain      Mural thrombus of heart      Myocardial infarction (H)      Positive colorectal cancer screening using Cologuard test 12/3/2019     Sebaceous cyst      Seizures (H)     Grand Mal     Subacute subdural hematoma (H)      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   Skin: W/D/I. No rashes, lesions or breaks in integrity.   Recent and remote memory intact, fund of knowledge wnl.    Alert and oriented x3, speech fluent and appropriate.   Comprehension intact with 2 step crossover command.   Finger nose finger smooth and accurate  PERRL, EOMI, No nystagmus,   Face symmetric, tongue midline, Uvula midline,  palate rises with phonation   Shoulder shrug equal  Arm strength bilateral grasp, biceps, triceps, and deltoids 5/5   Leg strength bilateral dorsiflexion, plantar flexion, and hip flexion 5/5  No extremity edema noted.    Muscle Bulk and tone wnl.   Gait and station:normal       RADIOGRAPHIC IMAGING: Films personally reviewed and interpreted.  Also reviewed and discussed with Dr. Bravo.   Reviewed imaging with patient and family  Resolved SDH    Emelina Smith CNP  [facility]  O: [phone]  P: [phone]      "

## 2021-06-05 NOTE — TELEPHONE ENCOUNTER
Parrish says he has been taking 7.5 mg on sat. He will continue this;  5 mg sun/tue/thu; 7.5 mg other days

## 2021-06-05 NOTE — TELEPHONE ENCOUNTER
Parrish stopped by the  today to report that he accidentally cancelled his lisinopril from [facility] but he intended to cancel amlodipine, which he is not taking. [facility] told him that the prescription had to be re-entered by us to be filled. Please resend script for lisinopril to [facility].

## 2021-06-05 NOTE — PATIENT INSTRUCTIONS - HE
No further follow up needed from neurosurgery perspective. Increase all activities as tolerated. No further follow up needed.

## 2021-06-05 NOTE — PATIENT INSTRUCTIONS - HE
1) Ibuprofen 600 mg three times daily with food for 10 days, then as needed.  2) Ice to the affected area 20 minutes three times daily.  3) Rest the foot, may continue with crutches.  4) Follow up in 10-14 days if not improving, sooner if worsening.

## 2021-06-05 NOTE — TELEPHONE ENCOUNTER
I left very detailed message about follow up and our number to call to schedule.    Margaret Vaughn RN

## 2021-06-05 NOTE — TELEPHONE ENCOUNTER
FYI - Status Update  Who is Calling: Patient  Update: Requests call back from [facility]  Okay to leave a detailed message?:  Yes

## 2021-06-06 NOTE — PROGRESS NOTES
ASSESSMENT AND PLAN:    1. Warfarin anticoagulation  Ongoing, no bleeding.     2. Primary hypertension  Controlled.      3. Positive colorectal cancer screening using Cologuard test  Had colonoscopy with polyp removed.  Repeat due in 3 years.     4. Adenomatous polyp of colon    5. Nonintractable epilepsy without status epilepticus, unspecified epilepsy type  Occasional seizure, he feels under control, has neurology follow up.     Patient Instructions   1  Continue present medications     2. Follow up in 6 months.      CHIEF COMPLAINT:  Chief Complaint   Patient presents with     Follow-up     HISTORY OF PRESENT ILLNESS:  Jurgen Kirklnad is a 59 y.o. male here in follow up.  Doing well overall.  No significant headache, or nausea.  No recent seizure.  Neurosurgery has signed off.  Tolerating his warfarin without bleeding symptoms.  No chest pain or unusual dyspnea.      REVIEW OF SYSTEMS:   See HPI, all other systems on review are negative.    Past Medical History:   Diagnosis Date     Adenomatous polyp of colon 3/3/2020     Alcohol abuse 1990    Remote     Chronic kidney disease      CKD (chronic kidney disease) stage 3, GFR 30-59 ml/min (H)      Compression of brain (H)      Coronary artery disease      Epilepsy (H)     with recurrent seizures     Family history of alcohol abuse      Headache      Hyperlipidemia      Hypertension      Ischemic cardiomyopathy      Midline shift of brain      Mural thrombus of heart      Myocardial infarction (H)      Positive colorectal cancer screening using Cologuard test 12/3/2019     Sebaceous cyst      Seizures (H)     Grand Mal     Subacute subdural hematoma (H)      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; 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; burrholes-seizures     Warfarin anticoagulation      Social History     Tobacco Use   Smoking Status Never Smoker   Smokeless Tobacco Never Used     Family History   Problem Relation Age of Onset     Heart disease Mother      Diabetes Mother       "Heart disease Sister      Heart disease Father      Past Surgical History:   Procedure Laterality Date     BRIAN HOLE OF CRANIUM Right 8/2/2019    Procedure: RIGHT BRIAN HOLE CREATION FOR EVACUATION OF SUBDURAL HEMATOMA;  Surgeon: Marie Bravo MD;  Location: [facility];  Service: Neurosurgery     COLONOSCOPY N/A 2010    normal-q 10 yrs     CORONARY ARTERY BYPASS GRAFT      9/18 [facility].      CRANIOTOMY N/A 1960    infant with TBI/neurosurgical intervention     CRANIOTOMY Right 9/13/2019    Procedure: RIGHT CRANIOTOMY FOR EVACUATION SUBDURAL HEMATOMA EVACUATION;  Surgeon: Marie Bravo MD;  Location: [facility];  Service: Neurosurgery     IR IVC FILTER PLACEMENT  10/3/2019     VITALS:  Vitals:    03/03/20 1025   BP: 120/76   Patient Site: Left Arm   Patient Position: Sitting   Cuff Size: Adult Regular   Pulse: (!) 54   SpO2: 94%   Weight: 222 lb 2 oz (100.8 kg)   Height: 5' 10\" (1.778 m)     Wt Readings from Last 3 Encounters:   03/03/20 222 lb 2 oz (100.8 kg)   01/30/20 215 lb (97.5 kg)   01/14/20 212 lb 8 oz (96.4 kg)     PHYSICAL EXAM:  Constitutional:  In NAD, alert and oriented  Cardiac:  S1 S2   Lungs: Clear to auscultation  Abdomen:   Soft, flat and non-tender    Extremities: no joint effusion, no significant edema  Neurologic:  Speech clear, no arm or leg weakness, gait normal       DECISION TO OBTAIN OLD RECORDS AND/OR OBTAIN HISTORY FROM SOMEONE OTHER THAN PATIENT, AND/OR ACCESSING CARE EVERYWHERE):  1  0     REVIEW AND SUMMARIZATION OF OLD RECORDS, AND/OR OBTAINING HISTORY FROM SOMEONE OTHER THAN PATIENT, AND/OR DISCUSSION OF CASE WITH ANOTHER HEALTH CARE PROVIDER:  2 reviewed neurosurgery note    REVIEW AND/OR ORDER OF OF CLINICAL LAB TESTS: 1  0.    REVIEW AND/OR ORDER OF RADIOLOGY TESTS: 1 0.    REVIEW AND/OR ORDER OF MEDICAL TESTS (EKG/ECHO/COLONOSCOPY/EGD): 1  0    INDEPENDENT  VISUALIZATION OF IMAGE, TRACING, OR SPECIMEN ITSELF (2 EACH):  0     TOTAL: 2    Current " Outpatient Medications   Medication Sig Dispense Refill     acetaminophen (TYLENOL) 325 MG tablet Take 2 tablets (650 mg total) by mouth every 6 (six) hours as needed.  0     amLODIPine (NORVASC) 10 MG tablet Take 1 tablet (10 mg total) by mouth daily. See PCP for refill 30 tablet 5     atorvastatin (LIPITOR) 40 MG tablet Take 1 tablet (40 mg total) by mouth daily. 90 tablet 3     enoxaparin (LOVENOX) 120 mg/0.8 mL syringe Inject 0.67 mL (100 mg total) under the skin see administration instructions. 100mg sq bid 1215, and 12/16 and qd 12/17, and then bid 12/19, 12/20, and qd 12/21. 10 Syringe 0     isosorbide mononitrate (IMDUR) 30 MG 24 hr tablet Take 1 tablet (30 mg total) by mouth daily. 30 tablet 11     lamoTRIgine (LAMICTAL) 200 MG tablet 1 TABLET IN THE MORNING, 1/2 TAB AT LUNCH AND 1 TABLET AT NIGHT              levETIRAcetam (KEPPRA) 500 MG tablet Take 1,500 mg by mouth 3 (three) times a day.       lisinopril (PRINIVIL,ZESTRIL) 10 MG tablet Take 2 tablets (20 mg total) by mouth daily. See PCP for refill 180 tablet 3     metoprolol succinate (TOPROL-XL) 200 MG 24 hr tablet Take 1 tablet by mouth daily.       warfarin (COUMADIN/JANTOVEN) 5 MG tablet Take 5 mg by mouth daily.  Adjust dose based on INR results as directed. 30 tablet 0     Jasson Abdul MD  Internal Medicine  [facility]

## 2021-06-07 NOTE — TELEPHONE ENCOUNTER
FYI - Status Update  Who is Calling: Patient  Update: Patient accidentally erased the voicemail that was left with instructions and would like a call to go over them again.  Okay to leave a detailed message?:  Yes

## 2021-06-07 NOTE — TELEPHONE ENCOUNTER
ANTICOAGULATION  MANAGEMENT    Assessment     Today's INR result of 2.5 is Therapeutic (goal INR of 2.0-3.0)        Warfarin taken as previously instructed    No new diet changes affecting INR    No new medication/supplements affecting INR    Continues to tolerate warfarin with no reported s/s of bleeding or thromboembolism     Previous INR was Therapeutic    Plan:     Left a detailed message for Jurgen and also Gary regarding INR result and instructed:     Warfarin Dosing Instructions:  Continue current warfarin dose 5 mg daily on sun/tue/thu; and 7.5 mg daily rest of week     Instructed patient to follow up no later than: one month    Instructed to call the [facility] for any changes, questions or concerns. (#[phone])   ?   Dakota Dowling RN    Subjective/Objective:      Jurgen Kirkland, a 59 y.o. male is on warfarin.     Jurgen reports:     Home warfarin dose: as updated on anticoagulation calendar per template     Missed doses: No     Medication changes:  No     S/S of bleeding or thromboembolism:  No     New Injury or illness:  No     Changes in diet or alcohol consumption:  No     Upcoming surgery, procedure or cardioversion:  No    Anticoagulation Episode Summary     Current INR goal:   2.0-3.0   TTR:   76.7 % (9.5 mo)   Next INR check:   5/21/2020   INR from last check:   2.50 (4/23/2020)   Weekly max warfarin dose:      Target end date:   Indefinite   INR check location:      Preferred lab:      Send INR reminders to:   [facility]    Indications    Apical mural thrombus [I51.3]  CAD  multiple vessel [I25.10]           Comments:            Anticoagulation Care Providers     Provider Role Specialty Phone number    Jasson Abdul MD Referring Internal Medicine [phone]

## 2021-06-08 NOTE — TELEPHONE ENCOUNTER
ANTICOAGULATION  MANAGEMENT    Assessment     Today's INR result of 2.0 is Therapeutic (goal INR of 2.0-3.0)        Warfarin taken as previously instructed    No new diet changes affecting INR    No new medication/supplements affecting INR    Continues to tolerate warfarin with no reported s/s of bleeding or thromboembolism     Previous INR was Therapeutic    Plan:     Spoke with Jurgen regarding INR result and instructed:     Warfarin Dosing Instructions:  Continue current warfarin dose 5 mg daily on sun/tue/thu; and 7.5 mg daily rest of week  (0 % change)    Instructed patient to follow up no later than: one month      Parrish verbalizes understanding and agrees to warfarin dosing plan.    Instructed to call the [facility] for any changes, questions or concerns. (#[phone])   ?   Dakota Dowling RN    Subjective/Objective:      Jurgen Kirkland, a 59 y.o. male is on warfarin.     Jurgen reports:     Home warfarin dose: as updated on anticoagulation calendar per template     Missed doses: No     Medication changes:  No     S/S of bleeding or thromboembolism:  No     New Injury or illness:  No     Changes in diet or alcohol consumption:  No     Upcoming surgery, procedure or cardioversion:  No    Anticoagulation Episode Summary     Current INR goal:   2.0-3.0   TTR:   76.7 % (9.5 mo)   Next INR check:   6/17/2020   INR from last check:   2.00 (5/20/2020)   Weekly max warfarin dose:      Target end date:   Indefinite   INR check location:      Preferred lab:      Send INR reminders to:   [facility]    Indications    Apical mural thrombus [I51.3]  CAD  multiple vessel [I25.10]           Comments:            Anticoagulation Care Providers     Provider Role Specialty Phone number    Jasson Abdul MD Referring Internal Medicine [phone]

## 2021-06-08 NOTE — TELEPHONE ENCOUNTER
Refill Approved    Rx renewed per Medication Renewal Policy. Medication was last renewed on 6/4/19.    Gabrielle Arias, [facility] Triage/Med Refill 6/1/2020     Requested Prescriptions   Pending Prescriptions Disp Refills     atorvastatin (LIPITOR) 40 MG tablet [Pharmacy Med Name: ATORVASTATIN CALCIUM 40 MG Tablet] 90 tablet 3     Sig: TAKE 1 TABLET EVERY DAY       Statins Refill Protocol (Hmg CoA Reductase Inhibitors) Passed - 5/27/2020  9:26 PM        Passed - PCP or prescribing provider visit in past 12 months      Last office visit with prescriber/PCP: 3/3/2020 Jasson Abdul MD OR same dept: 3/3/2020 Jasson Abdul MD OR same specialty: 3/3/2020 Jasson Abdul MD  Last physical: 9/12/2019 Last MTM visit: Visit date not found   Next visit within 3 mo: Visit date not found  Next physical within 3 mo: Visit date not found  Prescriber OR PCP: Jasson Abdul MD  Last diagnosis associated with med order: 1. Hyperlipemia  - atorvastatin (LIPITOR) 40 MG tablet [Pharmacy Med Name: ATORVASTATIN CALCIUM 40 MG Tablet]; TAKE 1 TABLET EVERY DAY  Dispense: 90 tablet; Refill: 3    If protocol passes may refill for 12 months if within 3 months of last provider visit (or a total of 15 months).

## 2021-06-08 NOTE — TELEPHONE ENCOUNTER
Prior Authorization Request  Who s requesting:  Pharmacy  Pharmacy Name and Location: [facility] - [facility] [address]   Medication Name: lamoTRIgine (LAMICTAL) 200 MG tablet     Insurance Plan: [employer]  Insurance Member ID Number:  [id number]  CoverMyMeds Key: [id number]  Informed patient that prior authorizations can take up to 10 business days for response:   NA  Okay to leave a detailed message: Yes

## 2021-06-08 NOTE — TELEPHONE ENCOUNTER
Central PA team  [phone]  Pool: HE PA MED ([id number])          PA has been initiated.       PA form completed and faxed insurance via Cover My Meds     Key:  [id number] - PA Case ID: [id number] - Rx #: [id number]     Medication:  lamoTRIgine 200MG tablets    Insurance:  [employer]        Response will be received via fax and may take up to 5-10 business days depending on plan

## 2021-06-08 NOTE — TELEPHONE ENCOUNTER
Who is calling:  PATIENT    Reason for Call:  Per Patient Mirta called him and stated they are unable to get in contact with us. ( OUR phone lines has been busy all day) Per Patient please contact them and renew the prescriptions    Date of last appointment with primary care: NA    Okay to leave a detailed message: No

## 2021-06-08 NOTE — TELEPHONE ENCOUNTER
Medication Question or Clarification  Who is calling: Roxanne with [facility], [phone]  [facility]# [id number]    What medication are you calling about (include dose and sig)?:     lamoTRIgine (LAMICTAL) 200 MG tablet  270 tablet  11  5/11/2020      Sig: Take 3 tablets in the morning; 1/2 tablet at Lunch;  3 tablets at night     Sent to pharmacy as: lamoTRIgine 200 mg tablet (LaMICtal)     E-Prescribing Status: Receipt confirmed by pharmacy (5/11/2020  3:03 PM CDT)         Who prescribed the medication?:   Jasson Abdul MD     What is your question/concern?:   Pharmacy is questioning dosage on above medication.  Recommended dosage is 200 mg daily or 400 mg daily with an enzyme inducing medication.  Please reach out to Pharmacy and advise.  Thank you.    Requested Pharmacy: [facility]     Okay to leave a detailed message?: Yes

## 2021-06-08 NOTE — PROGRESS NOTES
Jurgen Kirkland  1960      Assessment and Plan:  1. HTN under control  2. Hyperlipidemia- stable on current meds  3. Epilepsy- tolerates meds sees Dr Wren on annual basis  4 RTC 6 months check labs then    Chief Complaint: f/u multiple    Visit diagnoses:    1. Benign systolic hypertension     2. Epilepsy     3. Hyperlipemia     4. Chronic Kidney Disease, Stage 3         Meds:  Current Outpatient Prescriptions   Medication Sig Dispense Refill     aspirin 81 MG EC tablet Take 81 mg by mouth daily.       atenolol (TENORMIN) 50 MG tablet TAKE ONE TABLET BY MOUTH ONE TIME DAILY 90 tablet 1     cholecalciferol, vitamin D3, 1,000 unit tablet Take 1,000 Units by mouth daily.       lamoTRIgine (LAMICTAL) 200 MG tablet tAKE ONE TABLET IN THE MORNING, 1/2 TAB AT LUNCH AND 1 TABLET AT HS       levETIRAcetam (KEPPRA XR) 500 mg 24 hr tablet Take 500 mg by mouth. Take three tablets TID       rosuvastatin (CRESTOR) 20 MG tablet Take 1 tablet (20 mg total) by mouth bedtime. 90 tablet 3     simvastatin (ZOCOR) 40 MG tablet TAKE ONE TABLET BY MOUTH ONE TIME DAILY 90 tablet 1     No current facility-administered medications for this visit.      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 Disabled. Enjoys his pet dogs and works around house. Still has RN girlfriend- Renée. Takes medications and compliance had been a major issue in the past but apparently no more.       O:   Vitals:    01/24/17 0803   BP: 122/72   Patient Site: Left Arm   Patient Position: Sitting   Cuff Size: Adult Regular   Pulse: 72   Weight: 216 lb 3.2 oz (98.1 kg)   Height: 5' 10\" (1.778 m)       Physical Exam:  General- looks great.   VS- see above  HEENT- neg   Neck- no adenopathy/thyromegaly/bruits  Chest- clear   Cor- reg no murmurs/gallops/ectopics  Abdomen- soft non tender, no masses; no organomegaly  Extremities: no edema, good distal pulses  Neuro- Cr. NN-  intact, alert,     Lab Results   Component Value Date    CHOL 233 (H) 07/27/2016    CHOL 196 07/23/2015     Lab Results   Component Value Date    HDL 39 (L) 07/27/2016    HDL 36 (L) 07/23/2015     Lab Results   Component Value Date    LDLCALC 170 (H) 07/27/2016    LDLCALC 137 (H) 07/23/2015     Lab Results   Component Value Date    TRIG 122 07/27/2016    TRIG 117 07/23/2015     No components found for: CHOLHDL   Results for orders placed or performed in visit on 07/27/16   Comprehensive Metabolic Panel   Result Value Ref Range    Sodium 142 136 - 145 mmol/L    Potassium 4.8 3.5 - 5.1 mmol/L    Chloride 104 98 - 107 mmol/L    CO2 25 21 - 32 mmol/L    Anion Gap, Calculation 13 5 - 18 mmol/L    Glucose 95 74 - 125 mg/dL    BUN 25 (H) 8 - 22 mg/dL    Creatinine 2.26 (H) 0.70 - 1.30 mg/dL    GFR MDRD Af Amer 36 (L) >60 mL/min/1.73m2    GFR MDRD Non Af Amer 30 (L) >60 mL/min/1.73m2    Bilirubin, Total 0.8 0.2 - 1.0 mg/dL    Calcium 9.4 8.5 - 10.1 mg/dL    Protein, Total 7.6 6.4 - 8.2 g/dL    Albumin 4.5 3.5 - 5.0 g/dL    Alkaline Phosphatase 94 40 - 112 U/L    AST 30 23 - 45 U/L    ALT 39 12 - 78 U/L     Lab Results   Component Value Date    WBC 5.0 07/27/2016    HGB 17.7 07/27/2016    HCT 52.5 07/27/2016    MCV " 94 07/27/2016     07/27/2016       Jasson Ponce MD

## 2021-06-09 NOTE — TELEPHONE ENCOUNTER
Medication Request  Medication name:    Disp  Refills  Start  End     metoprolol succinate (TOPROL-XL) 200 MG 24 hr tablet    10/22/2019      Sig - Route: Take 1 tablet by mouth daily. - Oral     Class: Historical Med       Requested Pharmacy: [facility]  Reason for request: New prescription   When did you use medication last?:  Today   Patient offered appointment:  n/a  Okay to leave a detailed message: yes  [phone]    FYI: This medication is listed as an historical medication on the patient's current medication list.

## 2021-06-09 NOTE — TELEPHONE ENCOUNTER
ANTICOAGULATION  MANAGEMENT    Assessment     Today's INR result of 2.7 is Therapeutic (goal INR of 2.0-3.0)        Warfarin taken as previously instructed    No new diet changes affecting INR    No new medication/supplements affecting INR    Continues to tolerate warfarin with no reported s/s of bleeding or thromboembolism     Previous INR was Therapeutic    Plan:     Left a detailed message for Jurgen regarding INR result and instructed:     Warfarin Dosing Instructions:  Continue current warfarin dose 5 mg daily on sun/tue/thu; and 7.5 mg daily rest of week  (0 % change)    Instructed patient to follow up no later than: one month    Parrish verbalizes understanding and agrees to warfarin dosing plan.    Instructed to call the [facility] for any changes, questions or concerns. (#[phone])   ?   Dakota Dowling RN    Subjective/Objective:      Jurgen Kirkland, a 60 y.o. male is on warfarin.     Jurgen reports:     Home warfarin dose: as updated on anticoagulation calendar per template     Missed doses: No     Medication changes:  No     S/S of bleeding or thromboembolism:  No     New Injury or illness:  No     Changes in diet or alcohol consumption:  No     Upcoming surgery, procedure or cardioversion:  No    Anticoagulation Episode Summary     Current INR goal:   2.0-3.0   TTR:   80.8 % (9.5 mo)   Next INR check:   8/13/2020   INR from last check:   2.70 (7/16/2020)   Weekly max warfarin dose:      Target end date:   Indefinite   INR check location:      Preferred lab:      Send INR reminders to:   [facility]    Indications    Apical mural thrombus [I51.3]  CAD  multiple vessel [I25.10]           Comments:            Anticoagulation Care Providers     Provider Role Specialty Phone number    Jasson Abdul MD Referring Internal Medicine [phone]

## 2021-06-09 NOTE — TELEPHONE ENCOUNTER
ANTICOAGULATION  MANAGEMENT    Assessment     Today's INR result of 2.4 is Therapeutic (goal INR of 2.0-3.0)        Warfarin taken as previously instructed    No new diet changes affecting INR    No new medication/supplements affecting INR    Continues to tolerate warfarin with no reported s/s of bleeding or thromboembolism     Previous INR was Therapeutic    Plan:     Left a detailed message for Jurgen regarding INR result and instructed:     Warfarin Dosing Instructions:  Continue current warfarin dose 5 mg daily on sun/tue/thu; and 7.5 mg daily rest of week  (0 % change)    Instructed patient to follow up no later than: one month        Instructed to call the [facility] for any changes, questions or concerns. (#[phone])   ?   Dakota Dowling RN    Subjective/Objective:      Jurgen Kirkland, a 59 y.o. male is on warfarin.     Jurgen reports:     Home warfarin dose: as updated on anticoagulation calendar per template     Missed doses: No     Medication changes:  No     S/S of bleeding or thromboembolism:  No     New Injury or illness:  No     Changes in diet or alcohol consumption:  No     Upcoming surgery, procedure or cardioversion:  No    Anticoagulation Episode Summary     Current INR goal:   2.0-3.0   TTR:   79.1 % (9.5 mo)   Next INR check:   7/16/2020   INR from last check:   2.40 (6/18/2020)   Weekly max warfarin dose:      Target end date:   Indefinite   INR check location:      Preferred lab:      Send INR reminders to:   [facility]    Indications    Apical mural thrombus [I51.3]  CAD  multiple vessel [I25.10]           Comments:            Anticoagulation Care Providers     Provider Role Specialty Phone number    Jasson Abdul MD Referring Internal Medicine [phone]

## 2021-06-10 NOTE — TELEPHONE ENCOUNTER
ANTICOAGULATION  MANAGEMENT    Assessment     Today's INR result of 2.4 is Therapeutic (goal INR of 2.0-3.0)        Warfarin taken as previously instructed    No new diet changes affecting INR    No new medication/supplements affecting INR    Continues to tolerate warfarin with no reported s/s of bleeding or thromboembolism     Previous INR was Therapeutic    Plan:     Spoke on phone with Jurgen regarding INR result and instructed:     Warfarin Dosing Instructions:  Continue current warfarin dose 5 mg daily on sun/tue/thu; and 7.5 mg daily rest of week  (0 % change)    Instructed patient to follow up no later than: two weeks with flores Senior verbalizes understanding and agrees to warfarin dosing plan.    Instructed to call the [facility] for any changes, questions or concerns. (#[phone])   ?   Dakota Dowling RN    Subjective/Objective:      Jurgen Kirkland, a 60 y.o. male is on warfarin.     Jurgen reports:     Home warfarin dose: as updated on anticoagulation calendar per template     Missed doses: No     Medication changes:  No     S/S of bleeding or thromboembolism:  No     New Injury or illness:  No     Changes in diet or alcohol consumption:  No     Upcoming surgery, procedure or cardioversion:  No    Anticoagulation Episode Summary     Current INR goal:   2.0-3.0   TTR:   84.7 % (10.8 mo)   Next INR check:   9/10/2020   INR from last check:   2.40 (8/27/2020)   Weekly max warfarin dose:      Target end date:   Indefinite   INR check location:      Preferred lab:      Send INR reminders to:   [facility]    Indications    Apical mural thrombus [I51.3]  CAD  multiple vessel [I25.10]           Comments:            Anticoagulation Care Providers     Provider Role Specialty Phone number    Jasson Abdul MD Referring Internal Medicine [phone]

## 2021-06-10 NOTE — TELEPHONE ENCOUNTER
Patient called requesting a refill of warfarin. Sent to preferred pharmacy. INR follow up scheduled for 8/27.    Phyllis Ly RN

## 2021-06-11 NOTE — PROGRESS NOTES
ASSESSMENT AND PLAN:    1. Warfarin anticoagulation  Ongoing, no bleeding complications.     2. Hypertension, unspecified type  We clarified his doses of lisinopril and metoprolol.     3. Nonintractable epilepsy without status epilepticus, unspecified epilepsy type (H)  This has been less symptomatic.  He follows with neurology.     4. CKD (chronic kidney disease) stage 3, GFR 30-59 ml/min (H)  Stable with recent measurement.     Patient Instructions   1. Take the two lisinopril tablets daily for blood pressure.     2. Take only one metoprolol daily for blood pressure.     3. Follow up in 6 months.      CHIEF COMPLAINT:  Chief Complaint   Patient presents with     Follow-up     needs INR     HISTORY OF PRESENT ILLNESS:  Jurgen Kirkland is a 60 y.o. male is here in follow up.  He is doing well.  No headache or nausea or recent illness, or cough or dyspnea, or fever.  Minor seizure aura's intermittently.  He says they always improve after summer.  Some question about his medications for hypertension.  Weight up a bit.  He is aware.     REVIEW OF SYSTEMS:   See HPI, all other systems on review are negative.    Past Medical History:   Diagnosis Date     Adenomatous polyp of colon 3/3/2020     Alcohol abuse 1990    Remote     Chronic kidney disease      CKD (chronic kidney disease) stage 3, GFR 30-59 ml/min (H)      Compression of brain (H)      Coronary artery disease      Epilepsy (H)     with recurrent seizures     Family history of alcohol abuse      Headache      HTN (hypertension) 9/10/2020     Hyperlipidemia      Hypertension      Ischemic cardiomyopathy      Midline shift of brain      Mural thrombus of heart      Myocardial infarction (H)      Positive colorectal cancer screening using Cologuard test 12/3/2019     Sebaceous cyst      Seizures (H)     Grand Mal     Subacute subdural hematoma (H)      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; 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; burrholes-seizures     Warfarin  "anticoagulation      Social History     Tobacco Use   Smoking Status Never Smoker   Smokeless Tobacco Never Used     Family History   Problem Relation Age of Onset     Heart disease Mother      Diabetes Mother      Heart disease Sister      Heart disease Father      Past Surgical History:   Procedure Laterality Date     BRIAN HOLE OF CRANIUM Right 8/2/2019    Procedure: RIGHT BRIAN HOLE CREATION FOR EVACUATION OF SUBDURAL HEMATOMA;  Surgeon: Marie Bravo MD;  Location: [facility];  Service: Neurosurgery     COLONOSCOPY N/A 2010    normal-q 10 yrs     CORONARY ARTERY BYPASS GRAFT      9/18 [facility].      CRANIOTOMY N/A 1960    infant with TBI/neurosurgical intervention     CRANIOTOMY Right 9/13/2019    Procedure: RIGHT CRANIOTOMY FOR EVACUATION SUBDURAL HEMATOMA EVACUATION;  Surgeon: Marie Bravo MD;  Location: [facility];  Service: Neurosurgery     IR IVC FILTER PLACEMENT  10/3/2019     VITALS:  Vitals:    09/10/20 1016 09/10/20 1023   BP: 146/82 132/84   Patient Site: Left Arm    Patient Position: Sitting    Cuff Size: Adult Regular    Pulse: (!) 58    SpO2: 98%    Weight: (!) 226 lb (102.5 kg)    Height: 5' 10\" (1.778 m)      Wt Readings from Last 3 Encounters:   09/10/20 (!) 226 lb (102.5 kg)   03/03/20 222 lb 2 oz (100.8 kg)   01/30/20 215 lb (97.5 kg)     PHYSICAL EXAM:  Constitutional:  In NAD, alert and oriented  Neck: no cervical or axillary adenopathy  Cardiac:  S1 S2   Lungs: Clear   Abdomen:   Soft, flat and non-tender    Extremities: no joint effusion, no significant edema  Neurologic:  Speech clear, no arm or leg weakness     Psychiatric:  Mood and behavior appropriate, thinking is clear.     Current Outpatient Medications   Medication Sig Dispense Refill     acetaminophen (TYLENOL) 325 MG tablet Take 2 tablets (650 mg total) by mouth every 6 (six) hours as needed.  0     amLODIPine (NORVASC) 10 MG tablet Take 1 tablet (10 mg total) by mouth daily. See PCP for refill " 30 tablet 5     atorvastatin (LIPITOR) 40 MG tablet TAKE 1 TABLET EVERY DAY 90 tablet 2     enoxaparin (LOVENOX) 120 mg/0.8 mL syringe Inject 0.67 mL (100 mg total) under the skin see administration instructions. 100mg sq bid 1215, and 12/16 and qd 12/17, and then bid 12/19, 12/20, and qd 12/21. 10 Syringe 0     isosorbide mononitrate (IMDUR) 30 MG 24 hr tablet Take 1 tablet (30 mg total) by mouth daily. 30 tablet 11     lamoTRIgine (LAMICTAL) 200 MG tablet Take 3 tablets in the morning; 1/2 tablet at Lunch;  3 tablets at night 270 tablet 11     levETIRAcetam (KEPPRA) 500 MG tablet Take 1,500 mg by mouth 3 (three) times a day.       lisinopril (PRINIVIL,ZESTRIL) 10 MG tablet Take 2 tablets (20 mg total) by mouth daily. See PCP for refill 180 tablet 3     metoprolol succinate (TOPROL-XL) 200 MG 24 hr tablet Take 1 tablet (200 mg total) by mouth daily. 90 tablet 3     warfarin ANTICOAGULANT (COUMADIN/JANTOVEN) 5 MG tablet Take 1-1.5 tablets (5-7.5 mg) by mouth daily.  Adjust dose based on INR results as directed. 30 tablet 0     Jasson Abdul MD  Internal Medicine  [facility]

## 2021-06-11 NOTE — TELEPHONE ENCOUNTER
ANTICOAGULATION  MANAGEMENT    Assessment     Today's INR result of 2.9 is Therapeutic (goal INR of 2.0-3.0)        Warfarin taken as previously instructed    No new diet changes affecting INR    No new medication/supplements affecting INR    Continues to tolerate warfarin with no reported s/s of bleeding or thromboembolism     Previous INR was Therapeutic    Plan:     Spoke on phone with Jurgen regarding INR result and instructed:     Warfarin Dosing Instructions:  Continue current warfarin dose 5 mg daily on sun/tue/thur; and 7.5 mg daily rest of week  (0 % change)    Instructed patient to follow up no later than: one month scheduled    Parrish verbalizes understanding and agrees to warfarin dosing plan.    Instructed to call the [facility] for any changes, questions or concerns. (#[phone])   ?   Dakota Dowling RN    Subjective/Objective:      Jurgen Kirkland, a 60 y.o. male is on warfarin.     Jurgen reports:     Home warfarin dose: as updated on anticoagulation calendar per template     Missed doses: No     Medication changes:  No     S/S of bleeding or thromboembolism:  No     New Injury or illness:  No     Changes in diet or alcohol consumption:  No     Upcoming surgery, procedure or cardioversion:  No    Anticoagulation Episode Summary     Current INR goal:   2.0-3.0   TTR:   85.8 % (11.6 mo)   Next INR check:   10/19/2020   INR from last check:   2.90 (9/21/2020)   Weekly max warfarin dose:      Target end date:   Indefinite   INR check location:      Preferred lab:      Send INR reminders to:   [facility]    Indications    Apical mural thrombus [I51.3]  CAD  multiple vessel [I25.10]           Comments:            Anticoagulation Care Providers     Provider Role Specialty Phone number    Jasson Abdul MD Referring Internal Medicine [phone]

## 2021-06-11 NOTE — PATIENT INSTRUCTIONS - HE
1. Take the two lisinopril tablets daily for blood pressure.     2. Take only one metoprolol daily for blood pressure.     3. Follow up in 6 months.

## 2021-06-11 NOTE — TELEPHONE ENCOUNTER
Routed to the anticoagulation pool    Silvia Cruz, RN     [facility] Medication Refill and Triage Nurse  2:00 PM  9/24/2020

## 2021-06-12 NOTE — TELEPHONE ENCOUNTER
ANTICOAGULATION  MANAGEMENT    Assessment     Today's INR result of 2.8 is Therapeutic (goal INR of 2.0-3.0)        Warfarin taken as previously instructed    No new diet changes affecting INR    No new medication/supplements affecting INR    Continues to tolerate warfarin with no reported s/s of bleeding or thromboembolism     Previous INR was Therapeutic    Plan:     Left detailed message for Jurgen regarding INR result and instructed:     Warfarin Dosing Instructions:  Continue current warfarin dose 5 mg daily on sun/tue/thu; and 7.5 mg daily rest of week  (0 % change)    Instructed patient to follow up no later than: one month    Instructed to call the [facility] for any changes, questions or concerns. (#[phone])   ?   Dakota Dowling RN    Subjective/Objective:      Jurgen Kirkland, a 60 y.o. male is on warfarin.     Jurgen reports:     Home warfarin dose: as updated on anticoagulation calendar per template     Missed doses: No     Medication changes:  No     S/S of bleeding or thromboembolism:  No     New Injury or illness:  No     Changes in diet or alcohol consumption:  No     Upcoming surgery, procedure or cardioversion:  No    Anticoagulation Episode Summary     Current INR goal:  2.0-3.0   TTR:  87.4 % (1 y)   Next INR check:  11/16/2020   INR from last check:  2.80 (10/19/2020)   Weekly max warfarin dose:     Target end date:  Indefinite   INR check location:     Preferred lab:     Send INR reminders to:  [facility]    Indications    Apical mural thrombus [I51.3]  CAD  multiple vessel [I25.10]           Comments:           Anticoagulation Care Providers     Provider Role Specialty Phone number    Jasson Abdul MD Referring Internal Medicine [phone]

## 2021-06-12 NOTE — PROGRESS NOTES
Jurgen Kirkland  1960      Assessment and Plan:  1 continues to do remarkably well taking his medications tolerates them without problem  2 seizure disorder under control goes to [facility] see meds no recent change  3 hyperlipidemia continue on simvastatin  4 routine labs today return to clinic 6 months      Chief Complaint: Follow-up multiple    Visit diagnoses:    1. Hyperlipemia  Comprehensive Metabolic Panel    HM2(CBC w/o Differential)    Lipid Cascade    Thyroid Stimulating Hormone (TSH)   2. Benign essential HTN     3. Epilepsy     4. 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; burrholes-seizures     Past Surgical History:   Procedure Laterality Date     COLONOSCOPY N/A 2010    normal-q 10 yrs     CRANIOTOMY N/A 1960    infant with TBI/neurosurgical intervention     Social History     Social History     Marital status: Single     Spouse name: Yasmine     Number of children: 0     Years of education: N/A     Occupational History     disabled      previously manual type labor/aggravated seizure disorder     Social History Main Topics     Smoking status: Never Smoker     Smokeless tobacco: Not on file     Alcohol use No     Drug use: Not on file     Sexual activity: Not on file     Other Topics Concern     Not on file     Social History Narrative    Single has had girlfriend (RN)-Renée past several years. TBI as infant-fell down steps. Cognitive problems and seizures but finished high school. Refractory seizure disorder goes to [facility]. Recent better control post detention/disability. Lives  with brother (Gary)-primary contact, remote etoh problems; non smoker. No ETOH now. 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       atenolol (TENORMIN) 50 MG tablet TAKE ONE TABLET BY MOUTH ONE TIME DAILY 90 tablet 1     cholecalciferol, vitamin D3, 1,000 unit tablet Take 1,000 Units by mouth daily.       lamoTRIgine (LAMICTAL) 200 MG tablet tAKE ONE TABLET IN THE MORNING, 1/2 TAB AT LUNCH AND 1 TABLET AT HS       levETIRAcetam (KEPPRA XR) 500 mg 24 hr tablet Take 500 mg by mouth. Take three tablets TID       simvastatin (ZOCOR) 40 MG tablet TAKE ONE TABLET BY MOUTH ONE TIME DAILY 90 tablet 1     No current facility-administered medications for this visit.        No Known Allergies    ROS: complete review of symptoms otherwise negative except as noted below    S: He continues to do very well.  He has a cabin in the [facility].  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  He still with his girlfriend Renée who is a nurse.  He is not having any symptoms or problems.  He sees neurology things are stable there       O:   Vitals:    07/24/17 0809   BP: 138/84   Patient Site: Left Arm   Patient Position: Sitting   Cuff Size: Adult Regular   Pulse: (!) 55   Weight: 219 lb (99.3 kg)       Physical Exam:  General-healthy-appearing mildly hesitant type of speech pattern not readily noticeable  VS- see above  HEENT- neg   Neck- no adenopathy/thyromegaly/bruits  Chest- clear   Cor- reg no murmurs/gallops/ectopics  Abdomen- soft non tender, no masses; no organomegaly  Extremities: no edema, good distal pulses  Neuro- Cr. NN-  intact, alert,     Recent Results (from the past 240 hour(s))   HM2(CBC w/o Differential)   Result Value Ref Range    WBC 5.4 4.0 - 11.0  thou/uL    RBC 5.53 4.40 - 6.20 mill/uL    Hemoglobin 17.9 14.0 - 18.0 g/dL    Hematocrit 52.2 40.0 - 54.0 %    MCV 94 80 - 100 fL    MCH 32.3 27.0 - 34.0 pg    MCHC 34.2 32.0 - 36.0 g/dL    RDW 11.6 11.0 - 14.5 %    Platelets 159 140 - 440 thou/uL    MPV 7.0 7.0 - 10.0 fL         Jasson Ponce MD

## 2021-06-13 NOTE — TELEPHONE ENCOUNTER
ANTICOAGULATION  MANAGEMENT    Assessment     Today's INR result of 2.9 is Therapeutic (goal INR of 2.0-3.0)        Warfarin taken as previously instructed    No new diet changes affecting INR    No new medication/supplements affecting INR    Continues to tolerate warfarin with no reported s/s of bleeding or thromboembolism     Previous INR was Therapeutic    Plan:     Spoke on phone with Jurgen regarding INR result and instructed:     Warfarin Dosing Instructions:  Continue current warfarin dose 5 mg daily on sun/tue/thu; and 7.5 mg daily rest of week  (0 % change)    Instructed patient to follow up no later than: one month, scheduled    Parrish verbalizes understanding and agrees to warfarin dosing plan.    Instructed to call the [facility] for any changes, questions or concerns. (#[phone])   ?   Dakota Dowling RN    Subjective/Objective:      Jurgen Kirkland, a 60 y.o. male is on warfarin.     Jurgen reports:     Home warfarin dose: as updated on anticoagulation calendar per template     Missed doses: No     Medication changes:  No     S/S of bleeding or thromboembolism:  No     New Injury or illness:  No     Changes in diet or alcohol consumption:  No     Upcoming surgery, procedure or cardioversion:  No    Anticoagulation Episode Summary     Current INR goal:  2.0-3.0   TTR:  95.8 % (1 y)   Next INR check:  1/11/2021   INR from last check:  2.90 (12/14/2020)   Weekly max warfarin dose:     Target end date:  Indefinite   INR check location:     Preferred lab:     Send INR reminders to:  [facility]    Indications    Apical mural thrombus [I51.3]  CAD  multiple vessel [I25.10]           Comments:           Anticoagulation Care Providers     Provider Role Specialty Phone number    Jasson Abdul MD Referring Internal Medicine [phone]

## 2021-06-13 NOTE — TELEPHONE ENCOUNTER
ANTICOAGULATION  MANAGEMENT    Assessment     Today's INR result of 2.3 is Therapeutic (goal INR of 2.0-3.0)        Warfarin taken as previously instructed    No new diet changes affecting INR    No new medication/supplements affecting INR    Continues to tolerate warfarin with no reported s/s of bleeding or thromboembolism     Previous INR was Therapeutic    Plan:     Left detailed message for Jurgen regarding INR result and instructed:     Warfarin Dosing Instructions:  Continue current warfarin dose 5 mg daily on sun/tue/thu; and 7.5 mg daily rest of week      Instructed patient to follow up no later than: one month      Instructed to call the [facility] for any changes, questions or concerns. (#[phone])   ?   Dakota Dowling RN    Subjective/Objective:      Jurgen Kirkland, a 60 y.o. male is on warfarin.     Jurgen reports:     Home warfarin dose: as updated on anticoagulation calendar per template     Missed doses: No     Medication changes:  No     S/S of bleeding or thromboembolism:  No     New Injury or illness:  No     Changes in diet or alcohol consumption:  No     Upcoming surgery, procedure or cardioversion:  No    Anticoagulation Episode Summary     Current INR goal:  2.0-3.0   TTR:  92.4 % (1 y)   Next INR check:  12/14/2020   INR from last check:  2.30 (11/16/2020)   Weekly max warfarin dose:     Target end date:  Indefinite   INR check location:     Preferred lab:     Send INR reminders to:  [facility]    Indications    Apical mural thrombus [I51.3]  CAD  multiple vessel [I25.10]           Comments:           Anticoagulation Care Providers     Provider Role Specialty Phone number    Jasson Abdul MD Referring Internal Medicine [phone]

## 2021-06-13 NOTE — TELEPHONE ENCOUNTER
RN cannot approve Refill Request    RN can NOT refill this medication med is not covered by policy/route to provider. Last office visit: 9/10/2020 Jasson Abdul MD Last Physical: 9/12/2019 Last MTM visit: Visit date not found Last visit same specialty: 9/10/2020 Jasson Abdul MD.  Next visit within 3 mo: Visit date not found  Next physical within 3 mo: Visit date not found      Gisel Cancino, [facility] Triage/Med Refill 12/6/2020    Requested Prescriptions   Pending Prescriptions Disp Refills     warfarin ANTICOAGULANT (COUMADIN/JANTOVEN) 5 MG tablet [Pharmacy Med Name: WARFARIN SODIUM 5 MG Tablet] 135 tablet 0     Sig: TAKE 1 TO 1 AND 1/2  TABLETS DAILY.  ADJUST DOSE BASED ON INR RESULTS AS DIRECTED.       Warfarin Refill Protocol  Failed - 12/6/2020  1:39 AM        Failed -  Route to appropriate pool/provider     Last Anticoagulation Summary:   Anticoagulation Episode Summary     Current INR goal:  2.0-3.0   TTR:  93.3 % (11.5 mo)   Next INR check:  12/14/2020   INR from last check:  2.30 (11/16/2020)   Weekly max warfarin dose:     Target end date:  Indefinite   INR check location:     Preferred lab:     Send INR reminders to:  [facility]    Indications    Apical mural thrombus [I51.3]  CAD  multiple vessel [I25.10]           Comments:           Anticoagulation Care Providers     Provider Role Specialty Phone number    Jasson Abdul MD Referring Internal Medicine [phone]                Passed - Provider visit in last year     Last office visit with prescriber/PCP: 9/10/2020 Jasson Abdul MD OR same dept: 3/3/2020 Jsason Abdul MD OR same specialty: 9/10/2020 Jasson Abdul MD  Last physical: 9/12/2019 Last MTM visit: Visit date not found    Next appt within 3 mo: Visit date not found Next physical within 3 mo: Visit date not found  Prescriber OR PCP: Jasson Abdul MD  Last diagnosis associated with med order: 1. Acute deep vein thrombosis of left femoral vein (H)  - warfarin  ANTICOAGULANT (COUMADIN/JANTOVEN) 5 MG tablet [Pharmacy Med Name: WARFARIN SODIUM 5 MG Tablet]; TAKE 1 TO 1 AND 1/2  TABLETS DAILY.  ADJUST DOSE BASED ON INR RESULTS AS DIRECTED.  Dispense: 135 tablet; Refill: 0    If protocol passes may refill for 6 months if within 3 months of last provider visit (or a total of 9 months).

## 2021-06-13 NOTE — TELEPHONE ENCOUNTER
Faxed Refill request received from [facility] Mail order pharmacy.    Requested Prescriptions     Pending Prescriptions Disp Refills     lisinopriL (PRINIVIL,ZESTRIL) 10 MG tablet 180 tablet 3     Sig: Take 2 tablets (20 mg total) by mouth daily. See PCP for refill     Last Appt: 9/10/20  Next Appt:  3/10/21  90 day supply pended

## 2021-06-14 NOTE — TELEPHONE ENCOUNTER
ANTICOAGULATION  MANAGEMENT    Assessment     Today's INR result of 3.0 is Therapeutic (goal INR of 2.0-3.0)        Warfarin taken as previously instructed    Decreased greens/vitamin K intake may be affecting INR    No new medication/supplements affecting INR    Continues to tolerate warfarin with no reported s/s of bleeding or thromboembolism     Previous INR was Therapeutic    Plan:     Spoke on phone with Jurgen regarding INR result and instructed:     Warfarin Dosing Instructions:  Continue current warfarin dose 5 mg daily on sun/tue/thu; and 7.5 mg daily rest of week  (0 % change)    Instructed patient to follow up no later than: one month    Parrish verbalizes understanding and agrees to warfarin dosing plan.    Instructed to call the [facility] for any changes, questions or concerns. (#[phone])   ?   Dakota Dowling RN    Subjective/Objective:      Jurgen Kirkland, a 60 y.o. male is on warfarin.     Jurgen reports:     Home warfarin dose: as updated on anticoagulation calendar per template     Missed doses: No     Medication changes:  No     S/S of bleeding or thromboembolism:  No     New Injury or illness:  No     Changes in diet or alcohol consumption:  No     Upcoming surgery, procedure or cardioversion:  No    Anticoagulation Episode Summary     Current INR goal:  2.0-3.0   TTR:  100.0 % (1 y)   Next INR check:  2/8/2021   INR from last check:  3.00 (1/11/2021)   Weekly max warfarin dose:     Target end date:  Indefinite   INR check location:     Preferred lab:     Send INR reminders to:  [facility]    Indications    Apical mural thrombus [I51.3]  CAD  multiple vessel [I25.10]           Comments:           Anticoagulation Care Providers     Provider Role Specialty Phone number    Jasson Abdul MD Referring Internal Medicine [phone]

## 2021-06-14 NOTE — TELEPHONE ENCOUNTER
RN cannot approve Refill Request    RN can NOT refill this medication med is not covered by policy/route to provider. Last office visit: 9/10/2020 Jasson Abdul MD Last Physical: 9/12/2019 Last MTM visit: Visit date not found Last visit same specialty: 9/10/2020 Jasson Abdul MD.  Next visit within 3 mo: Visit date not found  Next physical within 3 mo: Visit date not found      Vandana Waller, [facility] Triage/Med Refill 2/2/2021    Requested Prescriptions   Pending Prescriptions Disp Refills     warfarin ANTICOAGULANT (COUMADIN/JANTOVEN) 5 MG tablet [Pharmacy Med Name: WARFARIN SODIUM 5 MG Tablet] 135 tablet 0     Sig: TAKE 1 TABLET (5MG) TO 1 AND 1/2 TABLETS (7.5MG) DAILY, AS DIRECTED.  ADJUST DOSE BASED ON INR RESULTS.       Warfarin Refill Protocol  Failed - 2/2/2021  7:37 PM        Failed -  Route to appropriate pool/provider     Last Anticoagulation Summary:   Anticoagulation Episode Summary     Current INR goal:  2.0-3.0   TTR:  100.0 % (11.4 mo)   Next INR check:  2/8/2021   INR from last check:  3.00 (1/11/2021)   Weekly max warfarin dose:     Target end date:  Indefinite   INR check location:     Preferred lab:     Send INR reminders to:  [facility]    Indications    Apical mural thrombus [I51.3]  CAD  multiple vessel [I25.10]           Comments:           Anticoagulation Care Providers     Provider Role Specialty Phone number    Jasson Abdul MD Referring Internal Medicine [phone]                Passed - Provider visit in last year     Last office visit with prescriber/PCP: 9/10/2020 Jasson Abdul MD OR same dept: 3/3/2020 Jasson Abdul MD OR same specialty: 9/10/2020 Jasson Abdul MD  Last physical: 9/12/2019 Last MTM visit: Visit date not found    Next appt within 3 mo: Visit date not found Next physical within 3 mo: Visit date not found  Prescriber OR PCP: Jasson Abdul MD  Last diagnosis associated with med order: 1. Acute deep vein thrombosis of left femoral vein  (H)  - warfarin ANTICOAGULANT (COUMADIN/JANTOVEN) 5 MG tablet [Pharmacy Med Name: WARFARIN SODIUM 5 MG Tablet]; TAKE 1 TABLET (5MG) TO 1 AND 1/2 TABLETS (7.5MG) DAILY, AS DIRECTED.  ADJUST DOSE BASED ON INR RESULTS.  Dispense: 135 tablet; Refill: 0    2. Apical mural thrombus  - warfarin ANTICOAGULANT (COUMADIN/JANTOVEN) 5 MG tablet [Pharmacy Med Name: WARFARIN SODIUM 5 MG Tablet]; TAKE 1 TABLET (5MG) TO 1 AND 1/2 TABLETS (7.5MG) DAILY, AS DIRECTED.  ADJUST DOSE BASED ON INR RESULTS.  Dispense: 135 tablet; Refill: 0    3. Long term (current) use of anticoagulants  - warfarin ANTICOAGULANT (COUMADIN/JANTOVEN) 5 MG tablet [Pharmacy Med Name: WARFARIN SODIUM 5 MG Tablet]; TAKE 1 TABLET (5MG) TO 1 AND 1/2 TABLETS (7.5MG) DAILY, AS DIRECTED.  ADJUST DOSE BASED ON INR RESULTS.  Dispense: 135 tablet; Refill: 0    If protocol passes may refill for 6 months if within 3 months of last provider visit (or a total of 9 months).

## 2021-06-15 NOTE — PROGRESS NOTES
ASSESSMENT AND PLAN:    1. Hypertension  Good control.   - metoprolol succinate (TOPROL-XL) 200 MG 24 hr tablet; Take 1 tablet (200 mg total) by mouth daily.  Dispense: 90 tablet; Refill: 3  - isosorbide mononitrate (IMDUR) 30 MG 24 hr tablet; Take 1 tablet (30 mg total) by mouth daily.  Dispense: 90 tablet; Refill: 3    2. History of colon polyps.   Due in 2022.      3. Chronic anticoagulation  Ongoing monitoring.    4. Epilepsy  On medication, no episodes, does follow with neurology    5. Chronic renal failure, stage 3  He does follow with nephrology.      6. Screening for prostate cancer  PSA today.     Patient Instructions   1. No changes in medications.     2. Continue follow up with neurology and nephrology.     3.  Follow up in 6 months.     CHIEF COMPLAINT:  Chief Complaint   Patient presents with     office visit     HISTORY OF PRESENT ILLNESS:  Jurgen Kirkland is a 60 y.o. male here in follow up.  He feels well.  Some INR issues. No bleeding, or headahce.  No dyspnea or cough.  Taking his medications as documented.      REVIEW OF SYSTEMS:   See HPI, all other systems on review are negative.    Past Medical History:   Diagnosis Date     Adenomatous polyp of colon 03/03/2020     Alcohol abuse 1990    Remote     CKD (chronic kidney disease) stage 3, GFR 30-59 ml/min      Compression of brain (H)      Coronary artery disease      Epilepsy (H)     with recurrent seizures     Family history of alcohol abuse      Headache      HTN (hypertension) 09/10/2020     Hyperlipidemia      Hypertension      Ischemic cardiomyopathy      Midline shift of brain      Mural thrombus of heart      Myocardial infarction (H)      Positive colorectal cancer screening using Cologuard test 12/03/2019     Sebaceous cyst      Seizures (H)     Grand Mal     Subacute subdural hematoma (H)      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; 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; burrholes-seizures     Warfarin anticoagulation      Social History  "    Tobacco Use   Smoking Status Never Smoker   Smokeless Tobacco Never Used     Family History   Problem Relation Age of Onset     Heart disease Mother      Diabetes Mother      Heart disease Sister      Heart disease Father      Past Surgical History:   Procedure Laterality Date     BRIAN HOLE OF CRANIUM Right 8/2/2019    Procedure: RIGHT BRIAN HOLE CREATION FOR EVACUATION OF SUBDURAL HEMATOMA;  Surgeon: Marie Bravo MD;  Location: [facility];  Service: Neurosurgery     COLONOSCOPY N/A 2010    normal-q 10 yrs     CORONARY ARTERY BYPASS GRAFT      9/18 [facility].      CRANIOTOMY N/A 1960    infant with TBI/neurosurgical intervention     CRANIOTOMY Right 9/13/2019    Procedure: RIGHT CRANIOTOMY FOR EVACUATION SUBDURAL HEMATOMA EVACUATION;  Surgeon: Marie Bravo MD;  Location: [facility];  Service: Neurosurgery     IR IVC FILTER PLACEMENT  10/3/2019     VITALS:  Vitals:    03/10/21 1115   BP: 120/70   Pulse: (!) 54   SpO2: 96%   Weight: (!) 224 lb (101.6 kg)   Height: 5' 10\" (1.778 m)     Wt Readings from Last 3 Encounters:   03/10/21 (!) 224 lb (101.6 kg)   09/10/20 (!) 226 lb (102.5 kg)   03/03/20 222 lb 2 oz (100.8 kg)     PHYSICAL EXAM:  Constitutional:  In NAD, alert and oriented  Neck: no cervical or axillary adenopathy  Cardiac:  S1 S2   Lungs: Clear   Abdomen:   Soft, flat and non-tender  Extremities:  no significant edema  Neurologic:  Speech clear, no arm or leg weakness, gait normal     Psychiatric:  Mood and behavior appropriate, thinking is clear.     DECISION TO OBTAIN OLD RECORDS AND/OR OBTAIN HISTORY FROM SOMEONE OTHER THAN PATIENT, AND/OR ACCESSING CARE EVERYWHERE):  1  0     REVIEW AND SUMMARIZATION OF OLD RECORDS, AND/OR OBTAINING HISTORY FROM SOMEONE OTHER THAN PATIENT, AND/OR DISCUSSION OF CASE WITH ANOTHER HEALTH CARE PROVIDER:  2 0    REVIEW AND/OR ORDER OF OF CLINICAL LAB TESTS: 1  ordered.    REVIEW AND/OR ORDER OF RADIOLOGY TESTS: 1 0.    REVIEW AND/OR ORDER " OF MEDICAL TESTS (EKG/ECHO/COLONOSCOPY/EGD): 1  0    INDEPENDENT  VISUALIZATION OF IMAGE, TRACING, OR SPECIMEN ITSELF (2 EACH):  0     TOTAL: 1    Current Outpatient Medications   Medication Sig Dispense Refill     acetaminophen (TYLENOL) 325 MG tablet Take 2 tablets (650 mg total) by mouth every 6 (six) hours as needed.  0     amLODIPine (NORVASC) 10 MG tablet Take 1 tablet (10 mg total) by mouth daily. See PCP for refill 30 tablet 5     atorvastatin (LIPITOR) 40 MG tablet TAKE 1 TABLET EVERY DAY 90 tablet 2     enoxaparin (LOVENOX) 120 mg/0.8 mL syringe Inject 0.67 mL (100 mg total) under the skin see administration instructions. 100mg sq bid 1215, and 12/16 and qd 12/17, and then bid 12/19, 12/20, and qd 12/21. 10 Syringe 0     isosorbide mononitrate (IMDUR) 30 MG 24 hr tablet Take 1 tablet (30 mg total) by mouth daily. 90 tablet 3     lamoTRIgine (LAMICTAL) 200 MG tablet Take 3 tablets in the morning; 1/2 tablet at Lunch;  3 tablets at night 270 tablet 11     levETIRAcetam (KEPPRA) 500 MG tablet Take 1,500 mg by mouth 3 (three) times a day.       lisinopriL (PRINIVIL,ZESTRIL) 10 MG tablet Take 2 tablets (20 mg total) by mouth daily. See PCP for refill 180 tablet 3     metoprolol succinate (TOPROL-XL) 200 MG 24 hr tablet Take 1 tablet (200 mg total) by mouth daily. 90 tablet 3     warfarin ANTICOAGULANT (COUMADIN/JANTOVEN) 3 MG tablet 1.5 tablet daily on Mon, Tues, Fri, Sat  1 tablet daily Thurs, Weds, and Sunday       warfarin ANTICOAGULANT (COUMADIN/JANTOVEN) 5 MG tablet TAKE 1 TABLET (5MG) TO 1 AND 1/2 TABLETS (7.5MG) DAILY, AS DIRECTED.  ADJUST DOSE BASED ON INR RESULTS. 135 tablet 0     Jasson Abdul MD  Internal Medicine  [facility]

## 2021-06-15 NOTE — PROGRESS NOTES
Jurgen Kirkland  1960      Assessment and Plan:  1 hypertension under control under current management medications  2 seizure disorder controlled  3 return to clinic 6 months or so  4 annual PSA screening today      Chief Complaint: Follow-up hypertension    Visit diagnoses:    1. Benign essential HTN     2. Screening PSA (prostate specific antigen)  PSA (Prostatic-Specific Antigen), Annual Screen   3. Epilepsy         Meds:  Current Outpatient Prescriptions   Medication Sig Dispense Refill     aspirin 81 MG EC tablet Take 81 mg by mouth daily.       cholecalciferol, vitamin D3, 1,000 unit tablet Take 1,000 Units by mouth daily.       lamoTRIgine (LAMICTAL) 200 MG tablet tAKE ONE TABLET IN THE MORNING, 1/2 TAB AT LUNCH AND 1 TABLET AT HS       levETIRAcetam (KEPPRA XR) 500 mg 24 hr tablet Take 500 mg by mouth. Take three tablets TID       metoprolol succinate (TOPROL XL) 50 MG 24 hr tablet Take 1 tablet (50 mg total) by mouth daily. 90 tablet 3     simvastatin (ZOCOR) 40 MG tablet TAKE 1 TABLET EVERY DAY 90 tablet 3     atenolol (TENORMIN) 50 MG tablet TAKE 1 TABLET EVERY DAY 90 tablet 1     No current facility-administered medications for this visit.        No Known Allergies    ROS: complete review of symptoms otherwise negative except as noted below    S: Jurgen is doing remarkably well.  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  He has a significant girlfriend.  No seizures or neurologic problems.  His brother sadly  from pancreatic cancer this summer-.  Otherwise no issues or concerns       O:   Vitals:    18 0953   BP: 138/82   Patient Site: Left Arm   Patient Position: Sitting   Cuff Size: Adult Regular   Pulse: 66   Weight: 215 lb (97.5 kg)       Physical Exam:  General-  VS- see above  HEENT- neg   Neck- no adenopathy/thyromegaly/bruits  Chest- clear   Cor- reg no murmurs/gallops/ectopics  Extremities: no edema, good distal pulses  Neuro- Cr. NN-  intact, alert,       Recent Results (from the past  240 hour(s))   PSA (Prostatic-Specific Antigen), Annual Screen   Result Value Ref Range    PSA 0.8 0.0 - 3.5 ng/mL           Jasson Ponce MD

## 2021-06-15 NOTE — TELEPHONE ENCOUNTER
ANTICOAGULATION  MANAGEMENT PROGRAM    Jurgen Kirkland is overdue for INR check.     Spoke with jessi and scheduled INR appointment on 3/18.      Dakota Dowling RN

## 2021-06-16 PROBLEM — I82.412: Status: ACTIVE | Noted: 2019-10-03

## 2021-06-16 PROBLEM — Z95.828 S/P IVC FILTER: Status: ACTIVE | Noted: 2020-01-31

## 2021-06-16 PROBLEM — I25.10 CAD, MULTIPLE VESSEL: Chronic | Status: ACTIVE | Noted: 2018-09-10

## 2021-06-16 PROBLEM — L72.3 SEBACEOUS CYST: Status: ACTIVE | Noted: 2019-07-01

## 2021-06-16 PROBLEM — I51.3 APICAL MURAL THROMBUS: Chronic | Status: ACTIVE | Noted: 2018-09-10

## 2021-06-16 PROBLEM — R19.5 POSITIVE COLORECTAL CANCER SCREENING USING COLOGUARD TEST: Status: ACTIVE | Noted: 2019-12-03

## 2021-06-16 PROBLEM — S06.5XAA SDH (SUBDURAL HEMATOMA) (H): Status: ACTIVE | Noted: 2019-08-01

## 2021-06-16 PROBLEM — Z95.1 S/P CABG (CORONARY ARTERY BYPASS GRAFT): Chronic | Status: ACTIVE | Noted: 2018-09-25

## 2021-06-16 PROBLEM — D12.6 ADENOMATOUS POLYP OF COLON: Status: ACTIVE | Noted: 2020-03-03

## 2021-06-16 PROBLEM — I10 HTN (HYPERTENSION): Status: ACTIVE | Noted: 2020-09-10

## 2021-06-16 PROBLEM — Z79.01 WARFARIN ANTICOAGULATION: Status: ACTIVE | Noted: 2019-10-10

## 2021-06-16 NOTE — TELEPHONE ENCOUNTER
Telephone Encounter by Dakota Dowling RN at 6/4/2019 12:21 PM     Author: Dakota Dowling RN Service: -- Author Type: Registered Nurse    Filed: 6/4/2019  1:18 PM Encounter Date: 6/4/2019 Status: Attested    : Dakota Dowling RN (Registered Nurse) Cosigner: Jasson Abdul MD at 6/5/2019 11:48 AM    Attestation signed by Jasson Abdul MD at 6/5/2019 11:48 AM    Jasson Abdul MD                ANTICOAGULATION  MANAGEMENT    Assessment     Today's INR result of 1.9 is Subtherapeutic (goal INR of 2.0-3.0)        Warfarin taken as previously instructed    No new diet changes affecting INR    No new medication/supplements affecting INR    Continues to tolerate warfarin with no reported s/s of bleeding or thromboembolism     Previous INR was Therapeutic    Plan:     Spoke with Jurgen regarding INR result and instructed:     Warfarin Dosing Instructions:  Change warfarin dose to 7.5 mg daily on sun/tue/thu; and 5 mg daily rest of week  (6 % change)    Instructed patient to follow up no later than: two weeks      Parrish verbalizes understanding and agrees to warfarin dosing plan.    Instructed to call the [facility] for any changes, questions or concerns. (#[phone])   ?   Dakota Dowling RN    Subjective/Objective:      Jurgen Kirkland, a 58 y.o. male is on warfarin.     Jurgen reports:     Home warfarin dose: as updated on anticoagulation calendar per template     Missed doses: No     Medication changes:  No     S/S of bleeding or thromboembolism:  No     New Injury or illness:  No     Changes in diet or alcohol consumption:  No     Upcoming surgery, procedure or cardioversion:  No    Anticoagulation Episode Summary     Current INR goal:   2.0-3.0   TTR:   56.8 % (8.6 mo)   Next INR check:   6/18/2019   INR from last check:   1.90! (6/4/2019)   Weekly max warfarin dose:      Target end date:   Indefinite   INR check location:      Preferred lab:      Send INR reminders to:   [facility]     Indications    Apical mural thrombus [I51.3]  CAD  multiple vessel [I25.10]           Comments:            Anticoagulation Care Providers     Provider Role Specialty Phone number    Jasson Ponce MD Referring Internal Medicine [phone]

## 2021-06-16 NOTE — TELEPHONE ENCOUNTER
Telephone Encounter by Loren Shafer at 5/8/2019  1:01 PM     Author: Loren Shafer Service: -- Author Type: --    Filed: 5/8/2019  1:03 PM Encounter Date: 5/6/2019 Status: Signed    : Loren Shafer APPROVED:    Approval start date: 5/7/19  Approval end date: 5/7/20    Pharmacy has been notified of approval and will contact patient when medication is ready for pickup.

## 2021-06-16 NOTE — TELEPHONE ENCOUNTER
ANTICOAGULATION  MANAGEMENT    Assessment     Today's INR result of 2.3 is Therapeutic (goal INR of 2.0-3.0)        Warfarin taken as previously instructed    No new diet changes affecting INR    No new medication/supplements affecting INR    Continues to tolerate warfarin with no reported s/s of bleeding or thromboembolism     Previous INR was Supratherapeutic    Plan:     Left detailed phone message for Jurgen regarding INR result and instructed:      Warfarin Dosing Instructions:  Continue current warfarin dose 7.5 mg daily on mon/wed/fri; and 5 mg daily rest of week      Instructed patient to follow up no later than: one month      Instructed to call the [facility] for any changes, questions or concerns. (#[phone])   ?   Dakota Dowling RN    Subjective/Objective:      Jurgen Kirkland, a 60 y.o. male is on warfarin. Jurgen Seaman reports:     Home warfarin dose: as updated on anticoagulation calendar per template     Missed doses: No     Medication changes:  No     S/S of bleeding or thromboembolism:  No     New Injury or illness:  No     Changes in diet or alcohol consumption:  No     Upcoming surgery, procedure or cardioversion:  No    Anticoagulation Episode Summary     Current INR goal:  2.0-3.0   TTR:  88.6 % (1 y)   Next INR check:  4/15/2021   INR from last check:  2.30 (3/18/2021)   Weekly max warfarin dose:     Target end date:  Indefinite   INR check location:     Preferred lab:     Send INR reminders to:  [facility]    Indications    Apical mural thrombus [I51.3]  CAD  multiple vessel [I25.10]           Comments:           Anticoagulation Care Providers     Provider Role Specialty Phone number    Jasson Abdul MD Referring Internal Medicine [phone]

## 2021-06-16 NOTE — TELEPHONE ENCOUNTER
Telephone Encounter by Radha Land at 10/7/2019  2:30 PM     Author: Radha Land Service: -- Author Type: --    Filed: 10/7/2019  2:31 PM Encounter Date: 10/7/2019 Status: Signed    : Radha Land       Message   Received: Today   Message Contents   Ge Walters MD Abdelaziz, Fatma; Zhanna Moreno RN             Please advise him to follow up with PCP first, then with me if PCP feels the need of follow up.     Thanks.    Previous Messages      ----- Message -----   From: Radha Land   Sent: 10/7/2019  10:43 AM CDT   To: Zhanna Moreno RN, Ge Walters MD     [facility],   This patient calledr today tp schedule hospital f/u consult.   Can you please advice if this patient need to be seen?     Thanks   Radha

## 2021-06-16 NOTE — TELEPHONE ENCOUNTER
Telephone Encounter by Dakota Dowling RN at 12/23/2019  5:44 PM     Author: Dakota Dowling RN Service: -- Author Type: Registered Nurse    Filed: 12/23/2019  5:59 PM Encounter Date: 12/23/2019 Status: Attested    : Dakota Dowling RN (Registered Nurse) Cosigner: Jasson Abdul MD at 12/24/2019  9:35 AM    Attestation signed by Jasson Abdul MD at 12/24/2019  9:35 AM    Jasson Abdul MD                ANTICOAGULATION  MANAGEMENT    Assessment     Today's INR result of 1.3 is Therapeutic (goal INR of 2.0-3.0)        Warfarin taken as previously instructed    No new diet changes affecting INR    No new medication/supplements affecting INR    Continues to tolerate warfarin with no reported s/s of bleeding or thromboembolism     Previous INR was Therapeutic    Plan:     Spoke with Jurgen and left message for Gary  regarding INR result and instructed:     Warfarin Dosing Instructions:  Change warfarin dose to 7.5 mg daily on mon/wed; and 5 mg daily rest of week  (14 % change)    Instructed patient to follow up no later than: fri 12/27      Parrish verbalizes understanding and agrees to warfarin dosing plan.    Instructed to call the [facility] for any changes, questions or concerns. (#[phone])   ?   Dakota Dowling RN    Subjective/Objective:      Jurgen Kirkland, a 59 y.o. male is on warfarin.     Jurgen reports:     Home warfarin dose: verbally confirmed home dose with Parrish and updated on anticoagulation calendar     Missed doses: No     Medication changes:  No     S/S of bleeding or thromboembolism:  No     New Injury or illness:  No     Changes in diet or alcohol consumption:  No     Upcoming surgery, procedure or cardioversion:  No    Anticoagulation Episode Summary     Current INR goal:   2.0-3.0   TTR:   64.0 % (9.5 mo)   Next INR check:   12/27/2019   INR from last check:   1.30! (12/23/2019)   Weekly max warfarin dose:      Target end date:   Indefinite   INR check location:      Preferred  lab:      Send INR reminders to:   [facility]    Indications    Apical mural thrombus [I51.3]  CAD  multiple vessel [I25.10]           Comments:            Anticoagulation Care Providers     Provider Role Specialty Phone number    Jasson Abdul MD Referring Internal Medicine [phone]

## 2021-06-16 NOTE — TELEPHONE ENCOUNTER
Telephone Encounter by Guillermo Nix RN at 6/17/2019 10:56 AM     Author: Guillermo Nix RN Service: -- Author Type: RN, Care Manager    Filed: 6/17/2019 11:01 AM Encounter Date: 6/17/2019 Status: Attested    : Guillermo Nix RN (RN, Care Manager) Cosigner: Jasson Abdul MD at 6/17/2019 11:11 AM    Attestation signed by Jasson Abdul MD at 6/17/2019 11:11 AM    Jasson Abdul MD                ANTICOAGULATION  MANAGEMENT    Assessment     Today's INR result of 2.9 is Therapeutic (goal INR of 2.0-3.0)        Warfarin taken as previously instructed    No new diet changes affecting INR    No new medication/supplements affecting INR    Continues to tolerate warfarin with no reported s/s of bleeding or thromboembolism     Previous INR was Subtherapeutic    Plan:     Spoke with Jurgen and brother Gary regarding INR result and instructed:     Warfarin Dosing Instructions:  Continue current warfarin dose    7.5 mg every Sun, Tue, Thu; 5 mg all other days         Instructed patient to follow up no later than: 2 weeks. Appt is made for 7/1.    Education provided: importance of taking warfarin as instructed    Higinio verbalize understanding and agrees to warfarin dosing plan.    Instructed to call the [facility] for any changes, questions or concerns. (#[phone])   ?   Guillermo Nix RN    Subjective/Objective:      Jurgen Kirkland, a 58 y.o. male is on warfarin.     Jurgen reports:     Home warfarin dose: verbally confirmed home dose with pt and updated on anticoagulation calendar     Missed doses: No     Medication changes:  No     S/S of bleeding or thromboembolism:  No     New Injury or illness:  No     Changes in diet or alcohol consumption:  No     Upcoming surgery, procedure or cardioversion:  No    Anticoagulation Episode Summary     Current INR goal:   2.0-3.0   TTR:   58.4 % (9 mo)   Next INR check:   7/1/2019   INR from last check:   2.90 (6/17/2019)   Weekly  max warfarin dose:      Target end date:   Indefinite   INR check location:      Preferred lab:      Send INR reminders to:   [facility]    Indications    Apical mural thrombus [I51.3]  CAD  multiple vessel [I25.10]           Comments:            Anticoagulation Care Providers     Provider Role Specialty Phone number    Jasson Ponce MD Referring Internal Medicine [phone]

## 2021-06-16 NOTE — TELEPHONE ENCOUNTER
Telephone Encounter by Dakota Dowling RN at 11/12/2019 11:30 AM     Author: Dakota Dowling RN Service: -- Author Type: Registered Nurse    Filed: 11/12/2019 12:16 PM Encounter Date: 11/12/2019 Status: Attested    : Dakota Dowling RN (Registered Nurse)    Related Notes: Original Note by Dakota Dowling RN (Registered Nurse) filed at 11/12/2019 12:14 PM    Cosigner: Jasson Abdul MD at 11/12/2019 12:35 PM    Attestation signed by aJsson Abdul MD at 11/12/2019 12:35 PM    Jasson Abdul MD                ANTICOAGULATION  MANAGEMENT    Assessment     Today's INR result of 2.6 is Therapeutic (goal INR of 2.0-3.0)        Warfarin taken as previously instructed    No new diet changes affecting INR    No new medication/supplements affecting INR    Continues to tolerate warfarin with no reported s/s of bleeding or thromboembolism     Previous INR was Supratherapeutic    Plan:     Spoke with Jurgen regarding INR result and instructed:     Warfarin Dosing Instructions:  Continue current warfarin dose 5 mg daily     Instructed patient to follow up no later than: two weeks      Parrish verbalizes understanding and agrees to warfarin dosing plan.    Instructed to call the [facility] for any changes, questions or concerns. (#[phone])   ?   Dakota Dowling RN    Subjective/Objective:      Jurgen Kirkland, a 59 y.o. male is on warfarin.     Jurgen reports:     Home warfarin dose: as updated on anticoagulation calendar per template     Missed doses: No     Medication changes:  metoprolol     S/S of bleeding or thromboembolism:  No     New Injury or illness:  No     Changes in diet or alcohol consumption:  No     Upcoming surgery, procedure or cardioversion:  No    Anticoagulation Episode Summary     Current INR goal:   2.0-3.0   TTR:   57.3 %   Next INR check:   11/26/2019   INR from last check:   2.60 (11/12/2019)   Weekly max warfarin dose:      Target end date:   Indefinite   INR check location:       Preferred lab:      Send INR reminders to:   [facility]    Indications    Apical mural thrombus [I51.3]  CAD  multiple vessel [I25.10]           Comments:            Anticoagulation Care Providers     Provider Role Specialty Phone number    Jasson Abdul MD Referring Internal Medicine [phone]

## 2021-06-16 NOTE — TELEPHONE ENCOUNTER
RN cannot approve Refill Request    RN can NOT refill this medication med is not covered by policy/route to provider. Last office visit: 3/10/2021 Jasson Abdul MD Last Physical: 9/12/2019 Last MTM visit: Visit date not found Last visit same specialty: 3/10/2021 Jasson Abdul MD.  Next visit within 3 mo: Visit date not found  Next physical within 3 mo: Visit date not found      Gisel Cancino, [facility] Triage/Med Refill 4/13/2021    Requested Prescriptions   Pending Prescriptions Disp Refills     warfarin ANTICOAGULANT (COUMADIN/JANTOVEN) 5 MG tablet [Pharmacy Med Name: WARFARIN SODIUM 5 MG Tablet] 135 tablet 0     Sig: TAKE 1 TABLET (5MG) TO 1 AND 1/2 TABLETS (7.5MG) DAILY, AS DIRECTED.  ADJUST DOSE BASED ON INR RESULTS.       Warfarin Refill Protocol  Failed - 4/12/2021  7:11 PM        Failed -  Route to appropriate pool/provider     Last Anticoagulation Summary:   Anticoagulation Episode Summary     Current INR goal:  2.0-3.0   TTR:  87.8 % (11.3 mo)   Next INR check:  4/15/2021   INR from last check:  2.30 (3/18/2021)   Weekly max warfarin dose:     Target end date:  Indefinite   INR check location:     Preferred lab:     Send INR reminders to:  [facility]    Indications    Apical mural thrombus [I51.3]  CAD  multiple vessel [I25.10]           Comments:           Anticoagulation Care Providers     Provider Role Specialty Phone number    Jasson Abdul MD Referring Internal Medicine [phone]                Passed - Provider visit in last year     Last office visit with prescriber/PCP: 3/10/2021 Jasson Abdul MD OR same dept: 3/10/2021 Jasson Abdul MD OR same specialty: 3/10/2021 Jasson Abdul MD  Last physical: 9/12/2019 Last MTM visit: Visit date not found    Next appt within 3 mo: Visit date not found Next physical within 3 mo: Visit date not found  Prescriber OR PCP: Jasson Abdul MD  Last diagnosis associated with med order: 1. Acute deep vein thrombosis of left femoral  vein (H)  - warfarin ANTICOAGULANT (COUMADIN/JANTOVEN) 5 MG tablet [Pharmacy Med Name: WARFARIN SODIUM 5 MG Tablet]; TAKE 1 TABLET (5MG) TO 1 AND 1/2 TABLETS (7.5MG) DAILY, AS DIRECTED.  ADJUST DOSE BASED ON INR RESULTS.  Dispense: 135 tablet; Refill: 0    2. Apical mural thrombus  - warfarin ANTICOAGULANT (COUMADIN/JANTOVEN) 5 MG tablet [Pharmacy Med Name: WARFARIN SODIUM 5 MG Tablet]; TAKE 1 TABLET (5MG) TO 1 AND 1/2 TABLETS (7.5MG) DAILY, AS DIRECTED.  ADJUST DOSE BASED ON INR RESULTS.  Dispense: 135 tablet; Refill: 0    3. Long term (current) use of anticoagulants  - warfarin ANTICOAGULANT (COUMADIN/JANTOVEN) 5 MG tablet [Pharmacy Med Name: WARFARIN SODIUM 5 MG Tablet]; TAKE 1 TABLET (5MG) TO 1 AND 1/2 TABLETS (7.5MG) DAILY, AS DIRECTED.  ADJUST DOSE BASED ON INR RESULTS.  Dispense: 135 tablet; Refill: 0    If protocol passes may refill for 6 months if within 3 months of last provider visit (or a total of 9 months).

## 2021-06-16 NOTE — TELEPHONE ENCOUNTER
Telephone Encounter by Dakota Dowling RN at 10/7/2019 12:22 PM     Author: Dakota Dowling RN Service: -- Author Type: Registered Nurse    Filed: 10/7/2019 12:33 PM Encounter Date: 10/7/2019 Status: Attested    : Dakota Dowling RN (Registered Nurse) Cosigner: Jasson Abdul MD at 10/7/2019 12:53 PM    Attestation signed by Jasson Abdul MD at 10/7/2019 12:53 PM    Jasson Abdul MD                ANTICOAGULATION  MANAGEMENT: Discharge Continuity of Care Review    Hospital admission on  10/2 for DVT.    Discharge disposition: Home    INR Results:       Recent labs: (last 7 days)     10/02/19  2141 10/03/19  0545   INR 1.03 1.06       Warfarin inpatient management:   lovenox started    Warfarin discharge instructions: home regimen continued with lovenox bridge     Medication Changes Affecting Anticoagulation: Yes: lovenox    Additional Factors Affecting Anticoagulation: No    Plan     Recommend inr within 2 days due to bridge with lovenox    Spoke with Parrish and left message for Gary    Anticoagulation calendar updated scheduled inr for wed 10/9    Dakota Dowling RN

## 2021-06-16 NOTE — TELEPHONE ENCOUNTER
Telephone Encounter by Dakota Dowling RN at 11/26/2019  1:29 PM     Author: Dakota Dowling RN Service: -- Author Type: Registered Nurse    Filed: 11/26/2019  1:37 PM Encounter Date: 11/26/2019 Status: Attested    : Dakota Dowling RN (Registered Nurse) Cosigner: Jasson Abdul MD at 11/26/2019  6:30 PM    Attestation signed by Jasson Abdul MD at 11/26/2019  6:30 PM    Jasson Abdul MD                ANTICOAGULATION  MANAGEMENT    Assessment     Today's INR result of 2.3 is Therapeutic (goal INR of 2.0-3.0)        Warfarin taken as previously instructed    No new diet changes affecting INR    No new medication/supplements affecting INR    Continues to tolerate warfarin with no reported s/s of bleeding or thromboembolism     Previous INR was Therapeutic    Plan:     Left a detailed message for Jurgen and spoke with Gary regarding INR result and instructed:     Warfarin Dosing Instructions:  Continue current warfarin dose 5 mg daily     Instructed patient to follow up no later than: one month      Gary verbalizes understanding and agrees to warfarin dosing plan.    Instructed to call the [facility] for any changes, questions or concerns. (#[phone])   ?   Dakota Dowling RN    Subjective/Objective:      Jurgen Kirkland, a 59 y.o. male is on warfarin.     Jurgen reports:     Home warfarin dose: as updated on anticoagulation calendar per template     Missed doses: No     Medication changes:  No     S/S of bleeding or thromboembolism:  No     New Injury or illness:  No     Changes in diet or alcohol consumption:  No     Upcoming surgery, procedure or cardioversion:  No    Anticoagulation Episode Summary     Current INR goal:   2.0-3.0   TTR:   61.9 % (9.5 mo)   Next INR check:   12/24/2019   INR from last check:   2.30 (11/26/2019)   Weekly max warfarin dose:      Target end date:   Indefinite   INR check location:      Preferred lab:      Send INR reminders to:   [facility]    Indications     Apical mural thrombus [I51.3]  CAD  multiple vessel [I25.10]           Comments:            Anticoagulation Care Providers     Provider Role Specialty Phone number    Jasson Abdul MD Referring Internal Medicine [phone]

## 2021-06-16 NOTE — TELEPHONE ENCOUNTER
Telephone Encounter by Dakota Dowling RN at 1/30/2020  4:54 PM     Author: Dakota Dowling RN Service: -- Author Type: Registered Nurse    Filed: 1/30/2020  4:59 PM Encounter Date: 1/30/2020 Status: Attested    : Dakota Dowling RN (Registered Nurse) Cosigner: Jasson Abdul MD at 1/31/2020  6:52 PM    Attestation signed by Jasson Abdul MD at 1/31/2020  6:52 PM    Jasson Abdul MD                ANTICOAGULATION  MANAGEMENT    Assessment     Today's INR result of 2.2 is Therapeutic (goal INR of 2.0-3.0)        Warfarin taken as previously instructed    No new diet changes affecting INR    No new medication/supplements affecting INR    Continues to tolerate warfarin with no reported s/s of bleeding or thromboembolism     Previous INR was Therapeutic    Plan:     Spoke with Jurgen regarding INR result and instructed:     Warfarin Dosing Instructions:  Continue current warfarin dose 7.5 mg daily on mon/wed/fri; and 5 mg daily rest of week  (0 % change)    Instructed patient to follow up no later than: one month    Parrish verbalizes understanding and agrees to warfarin dosing plan.    Instructed to call the [facility] for any changes, questions or concerns. (#[phone])   ?   Dakota Dowling RN    Subjective/Objective:      Jurgen Kirkland, a 59 y.o. male is on warfarin.     Jurgen reports:     Home warfarin dose: as updated on anticoagulation calendar per template     Missed doses: No     Medication changes:  No     S/S of bleeding or thromboembolism:  No     New Injury or illness:  No     Changes in diet or alcohol consumption:  No     Upcoming surgery, procedure or cardioversion:  No    Anticoagulation Episode Summary     Current INR goal:   2.0-3.0   TTR:   65.5 % (9.5 mo)   Next INR check:   2/27/2020   INR from last check:   2.20 (1/30/2020)   Weekly max warfarin dose:      Target end date:   Indefinite   INR check location:      Preferred lab:      Send INR reminders to:   [facility]     Indications    Apical mural thrombus [I51.3]  CAD  multiple vessel [I25.10]           Comments:            Anticoagulation Care Providers     Provider Role Specialty Phone number    Jasson Abdul MD Referring Internal Medicine [phone]

## 2021-06-16 NOTE — PROGRESS NOTES
Anticoagulation Annual Referral Renewal Review    Jurgen Kirkland's chart reviewed for annual renewal of referral to anticoagulation monitoring.        Criteria for anticoagulation nurse and/or pharmacist renewal met   Warfarin indication: Mural thrombus  DVT Yes, per indication   Current with INR monitoring/compliant Yes Yes   Date of last office visit 3/10/21 Yes, had office visit within last year   Time in Therapeutic Range (TTR) 88.6 % Yes, TTR > 60%       Jurgen Kirkland met all criteria for anticoagulation management program initiated renewal.  New INR standing orders and anticoagulation referral renewal placed.      Dakota Dowling RN  2:24 PM

## 2021-06-16 NOTE — TELEPHONE ENCOUNTER
Telephone Encounter by Dakota Dowling RN at 1/2/2020  3:20 PM     Author: Dakota Dowling RN Service: -- Author Type: Registered Nurse    Filed: 1/2/2020  3:29 PM Encounter Date: 1/2/2020 Status: Attested    : Dakota Dowling RN (Registered Nurse) Cosigner: Jasson Abdul MD at 1/2/2020  3:33 PM    Attestation signed by Jasson Abdul MD at 1/2/2020  3:33 PM    Jasson Abdul MD                ANTICOAGULATION  MANAGEMENT    Assessment     Today's INR result of 2.5 is Therapeutic (goal INR of 2.0-3.0)        Warfarin taken as previously instructed    No new diet changes affecting INR    No new medication/supplements affecting INR    Continues to tolerate warfarin with no reported s/s of bleeding or thromboembolism     Previous INR was Therapeutic    Plan:     Left a detailed message for Jurgen regarding INR result and instructed:     Warfarin Dosing Instructions:  Continue current warfarin dose 7.5 mg daily on mon/wed/fri; and 5 mg daily rest of week    Instructed patient to follow up no later than: two weeks      Instructed to call the [facility] for any changes, questions or concerns. (#[phone])   ?   Dakota Dowling RN    Subjective/Objective:      Jurgen Kirkland, a 59 y.o. male is on warfarin.     Jurgen reports:     Home warfarin dose: as updated on anticoagulation calendar per template     Missed doses: No     Medication changes:  No     S/S of bleeding or thromboembolism:  No     New Injury or illness:  No     Changes in diet or alcohol consumption:  No     Upcoming surgery, procedure or cardioversion:  No    Anticoagulation Episode Summary     Current INR goal:   2.0-3.0   TTR:   65.2 % (9.5 mo)   Next INR check:   1/16/2020   INR from last check:   2.50 (1/2/2020)   Weekly max warfarin dose:      Target end date:   Indefinite   INR check location:      Preferred lab:      Send INR reminders to:   [facility]    Indications    Apical mural thrombus [I51.3]  CAD  multiple vessel  [I25.10]           Comments:            Anticoagulation Care Providers     Provider Role Specialty Phone number    Jasson Abdul MD Referring Internal Medicine [phone]

## 2021-06-16 NOTE — TELEPHONE ENCOUNTER
Telephone Encounter by Dakota Dowling RN at 10/14/2019  1:35 PM     Author: Dakota Dowling RN Service: -- Author Type: Registered Nurse    Filed: 10/14/2019  1:45 PM Encounter Date: 10/14/2019 Status: Attested    : Dakota Dowling RN (Registered Nurse) Cosigner: Jasson Abdul MD at 10/14/2019  1:46 PM    Attestation signed by Jasson Abdul MD at 10/14/2019  1:46 PM    Jasson Abdul MD                ANTICOAGULATION  MANAGEMENT    Assessment     Today's INR result of 2.1 is Therapeutic (goal INR of 2.0-3.0)        Warfarin taken as previously instructed    No new diet changes affecting INR    No new medication/supplements affecting INR    Continues to tolerate warfarin with no reported s/s of bleeding or thromboembolism     Previous INR was Subtherapeutic    Plan:     Left a detailed message for Jurgen and brother Gary regarding INR result and instructed:     Warfarin Dosing Instructions:  Continue current warfarin dose 7.5 mg daily on sun/tue/thu; and 5 mg daily rest of week  (0 % change)    Instructed patient to follow up no later than: one week        Instructed to call the [facility] for any changes, questions or concerns. (#[phone])   ?   Dakota Dowling RN    Subjective/Objective:      Jurgen Kirkland, a 59 y.o. male is on warfarin.     Jurgen reports:     Home warfarin dose: as updated on anticoagulation calendar per template     Missed doses: No     Medication changes:  No     S/S of bleeding or thromboembolism:  No     New Injury or illness:  No     Changes in diet or alcohol consumption:  No     Upcoming surgery, procedure or cardioversion:  No    Anticoagulation Episode Summary     Current INR goal:   2.0-3.0   TTR:   63.1 %   Next INR check:   10/21/2019   INR from last check:   2.10 (10/14/2019)   Weekly max warfarin dose:      Target end date:   Indefinite   INR check location:      Preferred lab:      Send INR reminders to:   [facility]    Indications    Apical mural  thrombus [I51.3]  CAD  multiple vessel [I25.10]           Comments:            Anticoagulation Care Providers     Provider Role Specialty Phone number    Jasson Abdul MD Referring Internal Medicine [phone]

## 2021-06-16 NOTE — TELEPHONE ENCOUNTER
Telephone Encounter by Dakota Dowling RN at 1/16/2020  3:49 PM     Author: Dakota Dowling RN Service: -- Author Type: Registered Nurse    Filed: 1/16/2020  3:56 PM Encounter Date: 1/16/2020 Status: Attested    : Dakota Dowling RN (Registered Nurse) Cosigner: Jasson Abdul MD at 1/16/2020  4:14 PM    Attestation signed by Jasson Abdul MD at 1/16/2020  4:14 PM    Jasson Abdul MD                ANTICOAGULATION  MANAGEMENT    Assessment     Today's INR result of 2.3 is Therapeutic (goal INR of 2.0-3.0)        Warfarin taken as previously instructed    No new diet changes affecting INR    No new medication/supplements affecting INR    Continues to tolerate warfarin with no reported s/s of bleeding or thromboembolism     Previous INR was Therapeutic    Plan:     Left a detailed message for Jurgen regarding INR result and instructed:     Warfarin Dosing Instructions:  Continue current warfarin dose 7.5 mg daily on mon/wed/fri; and 5 mg daily rest of week      Instructed patient to follow up no later than: two weeks    Instructed to call the [facility] for any changes, questions or concerns. (#[phone])   ?   Dakota Dowling RN    Subjective/Objective:      Jurgen Kirkland, a 59 y.o. male is on warfarin.     Jurgen reports:     Home warfarin dose: as updated on anticoagulation calendar per template     Missed doses: No     Medication changes:  No     S/S of bleeding or thromboembolism:  No     New Injury or illness:  No     Changes in diet or alcohol consumption:  No     Upcoming surgery, procedure or cardioversion:  No    Anticoagulation Episode Summary     Current INR goal:   2.0-3.0   TTR:   65.5 % (9.5 mo)   Next INR check:   1/30/2020   INR from last check:   2.40 (1/16/2020)   Weekly max warfarin dose:      Target end date:   Indefinite   INR check location:      Preferred lab:      Send INR reminders to:   [facility]    Indications    Apical mural thrombus [I51.3]  CAD  multiple vessel  [I25.10]           Comments:            Anticoagulation Care Providers     Provider Role Specialty Phone number    Jasson Abdul MD Referring Internal Medicine [phone]

## 2021-06-16 NOTE — TELEPHONE ENCOUNTER
Telephone Encounter by Dakota Dowling RN at 2/27/2020  4:34 PM     Author: Dakota Dowling RN Service: -- Author Type: Registered Nurse    Filed: 2/27/2020  5:37 PM Encounter Date: 2/27/2020 Status: Attested    : Dakota Dowling RN (Registered Nurse) Cosigner: Jasson Abdul MD at 2/27/2020  5:41 PM    Attestation signed by Jasson Abdul MD at 2/27/2020  5:41 PM    Jasson Abdul MD                ANTICOAGULATION  MANAGEMENT    Assessment     Today's INR result of 2.4 is Therapeutic (goal INR of 2.0-3.0)        Warfarin taken as previously instructed    No new diet changes affecting INR    No new medication/supplements affecting INR    Continues to tolerate warfarin with no reported s/s of bleeding or thromboembolism     Previous INR was Therapeutic    Plan:     Spoke with Jurgen regarding INR result and instructed:     Warfarin Dosing Instructions:  Continue current warfarin dose 7.5 mg daily on mon/wed/fri; and 5 mg daily rest of week  (0 % change)    Instructed patient to follow up no later than: one month    Parrish verbalizes understanding and agrees to warfarin dosing plan.    Instructed to call the [facility] for any changes, questions or concerns. (#[phone])   ?   Dakota Dowling RN    Subjective/Objective:      Jurgen Kirkland, a 59 y.o. male is on warfarin.     Jurgen reports:     Home warfarin dose: as updated on anticoagulation calendar per template     Missed doses: No     Medication changes:  No     S/S of bleeding or thromboembolism:  No     New Injury or illness:  No     Changes in diet or alcohol consumption:  No     Upcoming surgery, procedure or cardioversion:  No    Anticoagulation Episode Summary     Current INR goal:   2.0-3.0   TTR:   65.5 % (9.5 mo)   Next INR check:   3/26/2020   INR from last check:   2.40 (2/27/2020)   Weekly max warfarin dose:      Target end date:   Indefinite   INR check location:      Preferred lab:      Send INR reminders to:   [facility]     Indications    Apical mural thrombus [I51.3]  CAD  multiple vessel [I25.10]           Comments:            Anticoagulation Care Providers     Provider Role Specialty Phone number    Jasson Abdul MD Referring Internal Medicine [phone]

## 2021-06-16 NOTE — TELEPHONE ENCOUNTER
Telephone Encounter by Dakota Dowling RN at 3/26/2020  1:59 PM     Author: Dakota Dowling RN Service: -- Author Type: Registered Nurse    Filed: 3/26/2020  2:05 PM Encounter Date: 3/26/2020 Status: Attested    : Dakota Dowling RN (Registered Nurse) Cosigner: Jasson Abdul MD at 3/26/2020  7:40 PM    Attestation signed by Jasson Abdul MD at 3/26/2020  7:40 PM    Jasson Abdul MD                ANTICOAGULATION  MANAGEMENT    Assessment     Today's INR result of 2.8 is Therapeutic (goal INR of 2.0-3.0)        Warfarin taken as previously instructed    No new diet changes affecting INR    No new medication/supplements affecting INR    Continues to tolerate warfarin with no reported s/s of bleeding or thromboembolism     Previous INR was Therapeutic    Plan:     Spoke with Jurgen regarding INR result and instructed:     Warfarin Dosing Instructions:  Continue current warfarin dose 7.5 mg daily on mon/wed/fri; and 5 mg daily rest of week  (0 % change)    Instructed patient to follow up no later than: one month      Parrish verbalizes understanding and agrees to warfarin dosing plan.    Instructed to call the [facility] for any changes, questions or concerns. (#[phone])   ?   Dakota Dwoling RN    Subjective/Objective:      Jurgen Kirkland, a 59 y.o. male is on warfarin.     Jurgen reports:     Home warfarin dose: as updated on anticoagulation calendar per template     Missed doses: No     Medication changes:  No     S/S of bleeding or thromboembolism:  No     New Injury or illness:  No     Changes in diet or alcohol consumption:  No     Upcoming surgery, procedure or cardioversion:  No    Anticoagulation Episode Summary     Current INR goal:   2.0-3.0   TTR:   69.0 % (9.5 mo)   Next INR check:   4/23/2020   INR from last check:   2.80 (3/26/2020)   Weekly max warfarin dose:      Target end date:   Indefinite   INR check location:      Preferred lab:      Send INR reminders to:   [facility]     Indications    Apical mural thrombus [I51.3]  CAD  multiple vessel [I25.10]           Comments:            Anticoagulation Care Providers     Provider Role Specialty Phone number    Jasson Abdul MD Referring Internal Medicine [phone]

## 2021-06-16 NOTE — TELEPHONE ENCOUNTER
Telephone Encounter by Dakota Dowling RN at 7/1/2019  2:03 PM     Author: Dakota Dowling RN Service: -- Author Type: Registered Nurse    Filed: 7/1/2019  2:10 PM Encounter Date: 7/1/2019 Status: Attested    : Dakota Dowling RN (Registered Nurse) Cosigner: Jasson Abdul MD at 7/1/2019  4:18 PM    Attestation signed by Jasson Abdul MD at 7/1/2019  4:18 PM    Jasson Abdul MD                ANTICOAGULATION  MANAGEMENT    Assessment     Today's INR result of 3.0 is Therapeutic (goal INR of 2.0-3.0)        Warfarin taken as previously instructed    No new diet changes affecting INR    No new medication/supplements affecting INR    Continues to tolerate warfarin with no reported s/s of bleeding or thromboembolism     Previous INR was Therapeutic    Plan:     Spoke with Jurgen regarding INR result and instructed:     Warfarin Dosing Instructions:  Continue current warfarin dose 7.5 mg daily on sun/tue/thu; and 5 mg daily rest of week  (0 % change)    Instructed patient to follow up no later than: one month      Parrish verbalizes understanding and agrees to warfarin dosing plan.    Instructed to call the [facility] for any changes, questions or concerns. (#[phone])   ?   Dakota Dowling RN    Subjective/Objective:      Jurgen Kirkland, a 59 y.o. male is on warfarin.     Jurgen reports:     Home warfarin dose: as updated on anticoagulation calendar per template     Missed doses: No     Medication changes:  No     S/S of bleeding or thromboembolism:  No     New Injury or illness:  No     Changes in diet or alcohol consumption:  No     Upcoming surgery, procedure or cardioversion:  No    Anticoagulation Episode Summary     Current INR goal:   2.0-3.0   TTR:   60.5 % (9.5 mo)   Next INR check:   7/29/2019   INR from last check:   3.00 (7/1/2019)   Weekly max warfarin dose:      Target end date:   Indefinite   INR check location:      Preferred lab:      Send INR reminders to:   [facility]     Indications    Apical mural thrombus [I51.3]  CAD  multiple vessel [I25.10]           Comments:            Anticoagulation Care Providers     Provider Role Specialty Phone number    Jasson Ponce MD Referring Internal Medicine [phone]

## 2021-06-16 NOTE — TELEPHONE ENCOUNTER
Telephone Encounter by Kristina Gonzalez RN at 12/27/2019  5:16 PM     Author: Kristina Gonzalez RN Service: -- Author Type: Registered Nurse    Filed: 12/27/2019  5:24 PM Encounter Date: 12/27/2019 Status: Attested    : Kristina Gonzalez RN (Registered Nurse) Cosigner: Jasson Abdul MD at 12/29/2019  5:52 PM    Attestation signed by Jasson Abdul MD at 12/29/2019  5:52 PM    Jasson Abdul MD                ANTICOAGULATION  MANAGEMENT    Assessment     Today's INR result of 1.6 is Subtherapeutic (goal INR of 2.0-3.0)        Warfarin taken differently than instructed, but no impact to total weekly dose    No new diet changes affecting INR    No new medication/supplements affecting INR    Continues to tolerate warfarin with no reported s/s of bleeding or thromboembolism     Previous INR was Subtherapeutic    Plan:     Left a detailed message for Jurgen and Gary regarding INR result and instructed:     Warfarin Dosing Instructions:  Take 7.5 mg today only then continue current warfarin dose 7.5 mg daily on Mon, Wed; and 5 mg daily rest of week  (0 % change)    Instructed patient to follow up no later than: Mon 12/30    Education provided: target INR goal and significance of current INR result    Instructed to call [facility] for any changes, questions or concerns. (#[phone])   ?   Kristina Gonzalez RN    Subjective/Objective:      Jurgen Kirkland, a 59 y.o. male is on warfarin.     Jurgen reports:     Home warfarin dose: as updated on anticoagulation calendar per template     Missed doses: No     Medication changes:  No     S/S of bleeding or thromboembolism:  No     New Injury or illness:  No     Changes in diet or alcohol consumption:  No     Upcoming surgery, procedure or cardioversion:  No    Anticoagulation Episode Summary     Current INR goal:   2.0-3.0   TTR:   64.0 % (9.5 mo)   Next INR check:   12/30/2019   INR from last check:   1.60! (12/27/2019)   Weekly max warfarin dose:       Target end date:   Indefinite   INR check location:      Preferred lab:      Send INR reminders to:   [facility]    Indications    Apical mural thrombus [I51.3]  CAD  multiple vessel [I25.10]           Comments:            Anticoagulation Care Providers     Provider Role Specialty Phone number    Jasson Abdul MD Referring Internal Medicine [phone]

## 2021-06-16 NOTE — TELEPHONE ENCOUNTER
Telephone Encounter by Dakota Dowling RN at 12/12/2019  1:55 PM     Author: Dakota Dowling RN Service: -- Author Type: Registered Nurse    Filed: 12/12/2019  2:25 PM Encounter Date: 12/12/2019 Status: Attested    : Dakota Dowling RN (Registered Nurse) Cosigner: Jasson Abdul MD at 12/12/2019  7:15 PM    Attestation signed by Jasson Abdul MD at 12/12/2019  7:15 PM    Jasson Abdul MD                ANTICOAGULATION  MANAGEMENT    Assessment     Today's INR result of 1.5 is Subtherapeutic (goal INR of 2.0-3.0)        Warfarin taken as previously instructed    No new diet changes affecting INR    No new medication/supplements affecting INR    Continues to tolerate warfarin with no reported s/s of bleeding or thromboembolism     Previous INR was Therapeutic     Colonoscopy 12/18    Plan:     Spoke with Jurgen and left message for Gary regarding INR result and instructed:     Warfarin Dosing Instructions:  Continue current warfarin dose until beginning hold for colonoscopy as instructed by Dr Abdul below     1. No warfarin starting 12/14, and hold until the day after colonoscopy, 12/19 at the present dose.      2. Lovenox blood thinner shots 100mg twice daily on 12/15 and 12/16 and once daily on 12/17.  Then start lovenox shots twice daily on 12/19, 12/20, and once daily on 12/21.       Instructed patient to follow up no later than: 12/23      Jurgen verbalizes understanding and agrees to warfarin dosing plan.    Instructed to call the [facility] for any changes, questions or concerns. (#[phone])   ?   Dakota Dowling RN    Subjective/Objective:      Jurgen Kirkland, a 59 y.o. male is on warfarin.     Jurgen reports:     Home warfarin dose: verbally confirmed home dose with Parrish and updated on anticoagulation calendar     Missed doses: No     Medication changes:  No     S/S of bleeding or thromboembolism:  No     New Injury or illness:  No     Changes in diet or alcohol consumption:   No     Upcoming surgery, procedure or cardioversion:  No    Anticoagulation Episode Summary     Current INR goal:   2.0-3.0   TTR:   64.0 % (9.5 mo)   Next INR check:   12/23/2019   INR from last check:   1.50! (12/12/2019)   Weekly max warfarin dose:      Target end date:   Indefinite   INR check location:      Preferred lab:      Send INR reminders to:   [facility]    Indications    Apical mural thrombus [I51.3]  CAD  multiple vessel [I25.10]           Comments:            Anticoagulation Care Providers     Provider Role Specialty Phone number    Jasson Abdul MD Referring Internal Medicine [phone]

## 2021-06-16 NOTE — TELEPHONE ENCOUNTER
Telephone Encounter by Dakota Dowling RN at 7/22/2019 10:14 AM     Author: Dakota Dowling RN Service: -- Author Type: Registered Nurse    Filed: 7/22/2019 10:24 AM Encounter Date: 7/22/2019 Status: Attested    : Dakota Dowling RN (Registered Nurse) Cosigner: Jasson Abdul MD at 7/22/2019 11:00 AM    Attestation signed by Jasson Abdul MD at 7/22/2019 11:00 AM    Jasson Abdul MD                ANTICOAGULATION  MANAGEMENT    Assessment     Today's INR result of 1.0 is Subtherapeutic (goal INR of 2.0-3.0)        Currenly holding warfarin as of 7/17    Continues to tolerate warfarin with no reported s/s of bleeding or thromboembolism  Does have subdural hematoma for which he is currently holding warfarin, okayed by cardiology. Does have cards appt tomorrow    Previous INR was Therapeutic    Plan:   Did talk with Parrish and his bother Gary last week  pls see 7/17    (#[phone])   ?   Dakota Dowling RN    Subjective/Objective:      Jurgen Kirkland, a 59 y.o. male is on warfarin.      Anticoagulation Episode Summary     Current INR goal:   2.0-3.0   TTR:   59.8 % (10.2 mo)   Next INR check:   7/29/2019   INR from last check:   1.05! (7/22/2019)   Weekly max warfarin dose:      Target end date:   Indefinite   INR check location:      Preferred lab:      Send INR reminders to:   [facility]    Indications    Apical mural thrombus [I51.3]  CAD  multiple vessel [I25.10]           Comments:            Anticoagulation Care Providers     Provider Role Specialty Phone number    Jasson Ponce MD Referring Internal Medicine [phone]

## 2021-06-16 NOTE — TELEPHONE ENCOUNTER
Telephone Encounter by Dakota Dowling RN at 10/9/2019  1:14 PM     Author: Dakota Dowling RN Service: -- Author Type: Registered Nurse    Filed: 10/9/2019  1:32 PM Encounter Date: 10/9/2019 Status: Attested    : Dakota Dowling RN (Registered Nurse) Cosigner: Jasson Abdul MD at 10/9/2019  1:45 PM    Attestation signed by Jasson Abdul MD at 10/9/2019  1:45 PM    Jasson Abdul MD                ANTICOAGULATION  MANAGEMENT    Assessment     Today's INR result of 1.8 is Subtherapeutic (goal INR of 2.0-3.0)        Warfarin taken as previously instructed resumed warfarin with concurrent lovenox daily until therapeutic    No new diet changes affecting INR    No new medication/supplements affecting INR    Continues to tolerate warfarin with no reported s/s of bleeding or thromboembolism     Previous INR was Subtherapeutic after long hold since 7/7/19 for subdural hematoma    Plan:     Left a detailed message for Jurgen and spoke with Gary regarding INR result and instructed:     Warfarin Dosing Instructions:  7.5 mg tonight to boost then continue current warfarin dose 7.5 mg daily on sun/tue/thu; and 5 mg daily rest of week. Continue lovenox daily until therapeutic unless Dr Abdul says otherwise    Instructed patient to follow up no later than: 10/4      Gary verbalizes understanding and agrees to warfarin dosing plan.    Instructed to call the [facility] for any changes, questions or concerns. (#[phone])   ?   Dakota Dowling RN    Subjective/Objective:      Jurgen Kirkland, a 59 y.o. male is on warfarin.     Jurgen reports:     Home warfarin dose: as updated on anticoagulation calendar per template     Missed doses: No     Medication changes:  No     S/S of bleeding or thromboembolism:  No     New Injury or illness:  No     Changes in diet or alcohol consumption:  No     Upcoming surgery, procedure or cardioversion:  No    Anticoagulation Episode Summary     Current INR goal:   2.0-3.0    TTR:   62.5 %   Next INR check:   10/14/2019   INR from last check:   1.80! (10/9/2019)   Weekly max warfarin dose:      Target end date:   Indefinite   INR check location:      Preferred lab:      Send INR reminders to:   [facility]    Indications    Apical mural thrombus [I51.3]  CAD  multiple vessel [I25.10]           Comments:            Anticoagulation Care Providers     Provider Role Specialty Phone number    Jasson Ponce MD Referring Internal Medicine [phone]

## 2021-06-16 NOTE — TELEPHONE ENCOUNTER
Telephone Encounter by Dakota Dowling RN at 10/29/2019  1:58 PM     Author: Dakota Dowling RN Service: -- Author Type: Registered Nurse    Filed: 10/29/2019  2:12 PM Encounter Date: 10/29/2019 Status: Attested    : Dakota Dowling RN (Registered Nurse) Cosigner: Jasson Abdul MD at 10/30/2019  9:34 AM    Attestation signed by Jasson Abdul MD at 10/30/2019  9:34 AM    Jasson Abdul MD                ANTICOAGULATION  MANAGEMENT    Assessment     Today's INR result of 3.9 is Supratherapeutic (goal INR of 2.0-3.0)        Warfarin taken as previously instructed    No new diet changes affecting INR    No new medication/supplements affecting INR    Continues to tolerate warfarin with no reported s/s of bleeding or thromboembolism     Previous INR was Supratherapeutic     Is meeting with Lyn tomorrow to discuss switching to doac per ov today    Plan:     Spoke with Jurgen regarding INR result and instructed:     Warfarin Dosing Instructions:  hold today then change warfarin dose to 5 mg daily   (12.5 % change)    Instructed patient to follow up no later than: 1-2 weeks    Parrish verbalizes understanding and agrees to warfarin dosing plan.    Instructed to call the [facility] for any changes, questions or concerns. (#[phone])   ?   Dakota Dowling RN    Subjective/Objective:      Jurgen Kirkland, a 59 y.o. male is on warfarin.     Jurgen reports:     Home warfarin dose: as updated on anticoagulation calendar per template     Missed doses: No     Medication changes:  No     S/S of bleeding or thromboembolism:  No     New Injury or illness:  No     Changes in diet or alcohol consumption:  No     Upcoming surgery, procedure or cardioversion:  No    Anticoagulation Episode Summary     Current INR goal:   2.0-3.0   TTR:   60.7 %   Next INR check:   11/12/2019   INR from last check:   3.90! (10/29/2019)   Weekly max warfarin dose:      Target end date:   Indefinite   INR check location:      Preferred  lab:      Send INR reminders to:   [facility]    Indications    Apical mural thrombus [I51.3]  CAD  multiple vessel [I25.10]           Comments:            Anticoagulation Care Providers     Provider Role Specialty Phone number    Jasson Abdul MD Referring Internal Medicine [phone]

## 2021-06-16 NOTE — TELEPHONE ENCOUNTER
Telephone Encounter by Dakota Dowling RN at 10/22/2019  1:40 PM     Author: Dakota Dowling RN Service: -- Author Type: Registered Nurse    Filed: 10/22/2019  1:55 PM Encounter Date: 10/22/2019 Status: Attested    : Dakota Dowling RN (Registered Nurse) Cosigner: Jasson Abdul MD at 10/23/2019  9:33 AM    Attestation signed by Jasson Abdul MD at 10/23/2019  9:33 AM    Jasson Abdul MD                ANTICOAGULATION  MANAGEMENT    Assessment     Today's INR result of 3.2 is Supratherapeutic (goal INR of 2.0-3.0)        Warfarin taken as previously instructed    No new diet changes affecting INR    No new medication/supplements affecting INR    Continues to tolerate warfarin with no reported s/s of bleeding or thromboembolism     Previous INR was Therapeutic    Plan:     Spoke with Jurgen regarding INR result and instructed:     Warfarin Dosing Instructions:  Change warfarin dose to 7.5 mg daily on sun/thu; and 5 mg daily rest of week  (6 % change)    Instructed patient to follow up no later than: one week      Parrish verbalizes understanding and agrees to warfarin dosing plan.    Instructed to call the [facility] for any changes, questions or concerns. (#[phone])   ?   Dakota Dowling RN    Subjective/Objective:      Jurgen Kirkland, a 59 y.o. male is on warfarin.     Jurgen reports:     Home warfarin dose: as updated on anticoagulation calendar per template     Missed doses: No     Medication changes:  No     S/S of bleeding or thromboembolism:  No     New Injury or illness:  No     Changes in diet or alcohol consumption:  No     Upcoming surgery, procedure or cardioversion:  No    Anticoagulation Episode Summary     Current INR goal:   2.0-3.0   TTR:   63.1 %   Next INR check:   10/29/2019   INR from last check:   3.20! (10/22/2019)   Weekly max warfarin dose:      Target end date:   Indefinite   INR check location:      Preferred lab:      Send INR reminders to:   [facility]     Indications    Apical mural thrombus [I51.3]  CAD  multiple vessel [I25.10]           Comments:            Anticoagulation Care Providers     Provider Role Specialty Phone number    Jasson Abdul MD Referring Internal Medicine [phone]

## 2021-06-17 ENCOUNTER — AMBULATORY - HEALTHEAST (OUTPATIENT)
Dept: LAB | Facility: CLINIC | Age: 61
End: 2021-06-17

## 2021-06-17 ENCOUNTER — COMMUNICATION - HEALTHEAST (OUTPATIENT)
Dept: ANTICOAGULATION | Facility: CLINIC | Age: 61
End: 2021-06-17

## 2021-06-17 DIAGNOSIS — I25.10 CAD, MULTIPLE VESSEL: ICD-10-CM

## 2021-06-17 DIAGNOSIS — I51.3 APICAL MURAL THROMBUS: ICD-10-CM

## 2021-06-17 DIAGNOSIS — I82.412 ACUTE DEEP VEIN THROMBOSIS OF LEFT FEMORAL VEIN (H): ICD-10-CM

## 2021-06-17 LAB — INR PPP: 2.9 (ref 0.9–1.1)

## 2021-06-17 NOTE — TELEPHONE ENCOUNTER
ANTICOAGULATION  MANAGEMENT    Assessment     Today's INR result of 2.5 is Therapeutic (goal INR of 2.0-3.0)        Warfarin taken as previously instructed template is wrong, asked Parrish or brother Gary to call to clarify current dose    No new diet changes affecting INR    No new medication/supplements affecting INR    Continues to tolerate warfarin with no reported s/s of bleeding or thromboembolism     Previous INR was Supratherapeutic    Plan:     Spoke on phone with Jurgen regarding INR result and instructed:      Warfarin Dosing Instructions:  Continue current warfarin dose 7.5 mg daily on mon/fri; and 5 mg daily rest of week  (0 % change)    Instructed patient to follow up no later than: two weeks       Parrish verbalizes understanding and agrees to warfarin dosing plan.    Instructed to call the [facility] for any changes, questions or concerns. (#[phone])   ?   Dakota Dowling RN    Subjective/Objective:      Jurgen Kirkland, a 60 y.o. male is on warfarin. Jurgen      Jurgen reports:     Home warfarin dose: verbally confirmed home dose with Parrish and updated on anticoagulation calendar     Missed doses: No     Medication changes:  No     S/S of bleeding or thromboembolism:  No     New Injury or illness:  No     Changes in diet or alcohol consumption:  No     Upcoming surgery, procedure or cardioversion:  No    Anticoagulation Episode Summary     Current INR goal:  2.0-3.0   TTR:  84.2 % (1 y)   Next INR check:  5/13/2021   INR from last check:  2.50 (4/29/2021)   Weekly max warfarin dose:     Target end date:  Indefinite   INR check location:     Preferred lab:     Send INR reminders to:  [facility]    Indications    Apical mural thrombus [I51.3]  CAD  multiple vessel [I25.10]           Comments:           Anticoagulation Care Providers     Provider Role Specialty Phone number    Jasson Abdul MD Referring Internal Medicine [phone]

## 2021-06-17 NOTE — TELEPHONE ENCOUNTER
Telephone Encounter by Dakota Dowling RN at 4/17/2020  1:18 PM     Author: Dakota Dowling RN Service: -- Author Type: Registered Nurse    Filed: 4/17/2020  1:22 PM Encounter Date: 4/17/2020 Status: Attested    : Dakota Dowling RN (Registered Nurse) Cosigner: Jasson Abdul MD at 4/17/2020  2:10 PM    Attestation signed by Jasson Abdul MD at 4/17/2020  2:10 PM    Jasson Abdul MD                Anticoagulation Annual Referral Renewal Review    Jurgen Kirkland's chart reviewed for annual renewal of referral to anticoagulation monitoring.        Criteria for anticoagulation nurse and/or pharmacist renewal met   Warfarin indication: DVT and apical mural thrombus Yes, per indication   Current with INR monitoring/compliant Yes Yes   Date of last office visit 3/3/20 Yes, had office visit within last year   Time in Therapeutic Range (TTR) 69 % Yes, TTR > 60%       Jurgen Kirkland met all criteria for anticoagulation management program initiated renewal.  New INR standing orders and anticoagulation referral renewal placed.      Dakota Dowling RN  1:18 PM

## 2021-06-17 NOTE — TELEPHONE ENCOUNTER
ANTICOAGULATION  MANAGEMENT    Assessment     Today's INR result of 2.1 is Therapeutic (goal INR of 2.0-3.0)        More warfarin taken than instructed which may be affecting INR    No new diet changes affecting INR    No new medication/supplements affecting INR    Continues to tolerate warfarin with no reported s/s of bleeding or thromboembolism     Previous INR was Therapeutic    Plan:     Spoke on phone with Jurgen and left a message for brother Gary regarding INR result and instructed:      Warfarin Dosing Instructions:  Continue current warfarin dose    7.5 mg every Mon, Wed, Fri; 5 mg all other days         Instructed patient to follow up no later than: 2-3 weeks    Education provided: target INR goal and significance of current INR result    Parrish verbalizes understanding and agrees to warfarin dosing plan.    Instructed to call the [facility] for any changes, questions or concerns. (#[phone])   ?   Marly Rodriguez RN    Subjective/Objective:      Jurgen Kirkland, a 60 y.o. male is on warfarin. Jurgen Seaman reports:     Home warfarin dose: verbally confirmed home dose with Parrish and updated on anticoagulation calendar. At some point he reverted back to his previous warfarin dose, he thinks it was before his last INR on 4/29/21.     Missed doses: No     Medication changes:  No     S/S of bleeding or thromboembolism:  No     New Injury or illness:  No     Changes in diet or alcohol consumption:  No     Upcoming surgery, procedure or cardioversion:  No    Anticoagulation Episode Summary     Current INR goal:  2.0-3.0   TTR:  84.2 % (1 y)   Next INR check:  6/3/2021   INR from last check:  2.10 (5/13/2021)   Weekly max warfarin dose:     Target end date:  Indefinite   INR check location:     Preferred lab:     Send INR reminders to:  [facility]    Indications    Apical mural thrombus [I51.3]  CAD  multiple vessel [I25.10]           Comments:           Anticoagulation Care Providers      Provider Role Specialty Phone number    Jasson Abdul MD Referring Internal Medicine [phone]

## 2021-06-17 NOTE — TELEPHONE ENCOUNTER
Telephone Encounter by Daniella Powers at 5/14/2020  3:29 PM     Author: Daniella Powers Service: -- Author Type: --    Filed: 5/14/2020  3:32 PM Encounter Date: 5/14/2020 Status: Signed    : Daniella Powers APPROVED:    Approval start date: 05/14/2020  Approval end date:  12/31/2020    Pharmacy has been notified of approval and will contact patient when medication is ready for pickup.

## 2021-06-17 NOTE — TELEPHONE ENCOUNTER
Telephone Encounter by Dakota Dowling RN at 2/11/2021 12:18 PM     Author: Dakota Dowling RN Service: -- Author Type: Registered Nurse    Filed: 2/11/2021 12:34 PM Encounter Date: 2/11/2021 Status: Signed    : Dakota Dowling RN (Registered Nurse)       ANTICOAGULATION  MANAGEMENT    Assessment     Today's INR result of 3.3 is Supratherapeutic (goal INR of 2.0-3.0)        Warfarin taken as previously instructed    No new diet changes affecting INR    No new medication/supplements affecting INR    Continues to tolerate warfarin with no reported s/s of bleeding or thromboembolism     Previous INR was Therapeutic but trending up    Plan:     Spoke on phone with Mary INR result and instructed:      Warfarin Dosing Instructions:  Change warfarin dose to 7.5 mg daily on mon/wed/fri; and 5 mg daily rest of week  (0 % change)    Instructed patient to follow up no later than: two weeks      both verbalize understanding and agree to warfarin dosing plan.    Instructed to call the [facility] for any changes, questions or concerns. (#[phone])   ?   Dakota Dowling RN    Subjective/Objective:      Jurgen Kirkland, a 60 y.o. male is on warfarin. Jurgen Seaman reports:     Home warfarin dose: verbally confirmed home dose with Parrish and updated on anticoagulation calendar     Missed doses: No     Medication changes:  No     S/S of bleeding or thromboembolism:  No     New Injury or illness:  No     Changes in diet or alcohol consumption:  No     Upcoming surgery, procedure or cardioversion:  No    Anticoagulation Episode Summary     Current INR goal:  2.0-3.0   TTR:  91.5 % (1 y)   Next INR check:  2/25/2021   INR from last check:  3.30 (2/11/2021)   Weekly max warfarin dose:     Target end date:  Indefinite   INR check location:     Preferred lab:     Send INR reminders to:  [facility]    Indications    Apical mural thrombus [I51.3]  CAD  multiple vessel [I25.10]           Comments:            Anticoagulation Care Providers     Provider Role Specialty Phone number    Jasson Abdul MD Referring Internal Medicine [phone]

## 2021-06-17 NOTE — TELEPHONE ENCOUNTER
Telephone Encounter by Marly Rodriguez RN at 4/15/2021 12:34 PM     Author: Marly Rodriguez RN Service: -- Author Type: Registered Nurse    Filed: 4/15/2021 12:48 PM Encounter Date: 4/15/2021 Status: Signed    : Marly Rodriguez RN (Registered Nurse)       ANTICOAGULATION  MANAGEMENT    Assessment     Today's INR result of 3.4 is Supratherapeutic (goal INR of 2.0-3.0)        Warfarin taken as previously instructed    No new diet changes affecting INR    No new medication/supplements affecting INR    Continues to tolerate warfarin with no reported s/s of bleeding or thromboembolism     Previous INR was Therapeutic    Plan:     Spoke on phone with Jurgen and left a VM for brother Gary regarding INR result and instructed:      Warfarin Dosing Instructions:  partial hold today Thur 4/15 take 2.5mg then change warfarin dose to 7.5 mg daily on Mon/Fri; and 5 mg daily rest of week  (5.9 % change)    Instructed patient to follow up no later than: 2 weeks    Education provided: target INR goal and significance of current INR result    Parrish verbalizes understanding and agrees to warfarin dosing plan.    Instructed to call the [facility] for any changes, questions or concerns. (#[phone])   ?   Marly Rodriguez RN    Subjective/Objective:      Jurgen Kirkland, a 60 y.o. male is on warfarin. Jurgen Seaman reports:     Home warfarin dose: as updated on anticoagulation calendar per template     Missed doses: No     Medication changes:  No     S/S of bleeding or thromboembolism:  No     New Injury or illness:  No     Changes in diet or alcohol consumption:  No     Upcoming surgery, procedure or cardioversion:  No    Anticoagulation Episode Summary     Current INR goal:  2.0-3.0   TTR:  85.9 % (1 y)   Next INR check:  4/29/2021   INR from last check:  3.40 (4/15/2021)   Weekly max warfarin dose:     Target end date:  Indefinite   INR check location:     Preferred lab:     Send INR reminders to:   [facility]    Indications    Apical mural thrombus [I51.3]  CAD  multiple vessel [I25.10]           Comments:           Anticoagulation Care Providers     Provider Role Specialty Phone number    Jasson Abdul MD Referring Internal Medicine [phone]

## 2021-06-18 ENCOUNTER — COMMUNICATION - HEALTHEAST (OUTPATIENT)
Dept: INTERNAL MEDICINE | Facility: CLINIC | Age: 61
End: 2021-06-18

## 2021-06-18 DIAGNOSIS — I82.412 ACUTE DEEP VEIN THROMBOSIS OF LEFT FEMORAL VEIN (H): ICD-10-CM

## 2021-06-18 DIAGNOSIS — I51.3 APICAL MURAL THROMBUS: ICD-10-CM

## 2021-06-18 DIAGNOSIS — Z79.01 LONG TERM (CURRENT) USE OF ANTICOAGULANTS: ICD-10-CM

## 2021-06-18 NOTE — LETTER
Letter by Jasson Ponce MD at      Author: Jasson Ponce MD Service: -- Author Type: --    Filed:  Encounter Date: 3/1/2019 Status: (Other)       Jurgen Kirkland  [address] [place] [address]             March 1, 2019         Dear Mr. Kirkland,    Below are the results from your recent visit:    Resulted Orders   LDL Cholesterol, Direct   Result Value Ref Range    Direct  <=129 mg/dl   Comprehensive Metabolic Panel   Result Value Ref Range    Sodium 139 136 - 145 mmol/L    Potassium 4.6 3.5 - 5.0 mmol/L    Chloride 105 98 - 107 mmol/L    CO2 28 22 - 31 mmol/L    Anion Gap, Calculation 6 5 - 18 mmol/L    Glucose 55 (LL) 70 - 125 mg/dL    BUN 26 (H) 8 - 22 mg/dL    Creatinine 1.80 (H) 0.70 - 1.30 mg/dL    GFR MDRD Af Amer 47 (L) >60 mL/min/1.73m2    GFR MDRD Non Af Amer 39 (L) >60 mL/min/1.73m2    Bilirubin, Total 0.5 0.0 - 1.0 mg/dL    Calcium 9.8 8.5 - 10.5 mg/dL    Protein, Total 6.6 6.0 - 8.0 g/dL    Albumin 3.9 3.5 - 5.0 g/dL    Alkaline Phosphatase 103 45 - 120 U/L    AST 32 0 - 40 U/L    ALT 41 0 - 45 U/L    Narrative    Fasting Glucose reference range is 70-99 mg/dL per  American Diabetes Association (ADA) guidelines.   HM2(CBC w/o Differential)   Result Value Ref Range    WBC 5.6 4.0 - 11.0 thou/uL    RBC 5.15 4.40 - 6.20 mill/uL    Hemoglobin 16.5 14.0 - 18.0 g/dL    Hematocrit 49.2 40.0 - 54.0 %    MCV 96 80 - 100 fL    MCH 32.1 27.0 - 34.0 pg    MCHC 33.6 32.0 - 36.0 g/dL    RDW 12.0 11.0 - 14.5 %    Platelets 198 140 - 440 thou/uL    MPV 7.0 7.0 - 10.0 fL       Jurgen, these numbers are all stable. The low blood sugar will resolve when you eat. No other concerns with stable creatinine/kidney function and LDL cholesterol. You should see Dr Abdul in 4 months for a general check and get acquainted visit. It has been a pleasure working with you all these years. I suggest you are doing better now than ever. Keep up the good work/ jps    Please call with questions or contact  us using [facility].    Sincerely,        Electronically signed by Jasson Ponce MD        Closure 3 Information: This tab is for additional flaps and grafts above and beyond our usual structured repairs.  Please note if you enter information here it will not currently bill and you will need to add the billing information manually.

## 2021-06-18 NOTE — PROGRESS NOTES
Jurgen Kirkland  1960      Assessment and Plan:  1 left leg calf pain possible torn plantaris muscle    June 21, 2018 addendum: Ultrasound shows popliteal cyst probably symptomatic will refer to orthopedics evidence of DVT patient called with the report  [facility] LOWER EXTREMITY VEINS LTD LEFT  6/20/2018 5:00 PM     INDICATION: Pain in left lower leg  TECHNIQUE: Routine exam without and with compression, augmentation, and duplex  utilizing 2D gray-scale imaging, Doppler interrogation with color-flow and  spectral waveform analysis.  COMPARISON: None.     FINDINGS: The common femoral, femoral, popliteal, and segmentally visualized  calf veins were evaluated. The opposite CFV was also included in the evaluation.     Left leg veins are negative for deep venous thrombosis. There is a left  popliteal cyst measuring 3.5 x 1.8 x 0.9 cm     CONCLUSION:  1.  Left leg veins are negative for DVT.  2.  Left popliteal cyst measuring 3.5 x 1.8 x 0.9 cm.          Chief Complaint: Left calf pain  Visit diagnoses:    1. 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; burrholes-seizures     Past Surgical History:   Procedure Laterality Date     COLONOSCOPY N/A 2010    normal-q 10 yrs     CRANIOTOMY N/A 1960    infant with TBI/neurosurgical intervention     Social History     Social History     Marital status: Single     Spouse name: Yasmine     Number of children: 0     Years of education: N/A     Occupational History     disabled      previously manual type labor/aggravated seizure disorder     Social History Main Topics     Smoking status: Never Smoker     Smokeless tobacco: Never Used     Alcohol use No     Drug use: Not on file     Sexual activity: Not on file     Other Topics  Concern     Not on file     Social History Narrative    Single has had girlfriend (RN)-Renée past several years. TBI as infant-fell down steps. Cognitive problems and seizures but finished high school. Refractory seizure disorder goes to [facility]. Recent better control post assisted/disability. Lives with brother (Gary)-primary contact, remote etoh problems; non smoker. No ETOH now. 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       atenolol (TENORMIN) 50 MG tablet TAKE 1 TABLET EVERY DAY 90 tablet 1     cholecalciferol, vitamin D3, 1,000 unit tablet Take 1,000 Units by mouth daily.       lamoTRIgine (LAMICTAL) 200 MG tablet tAKE ONE TABLET IN THE MORNING, 1/2 TAB AT LUNCH AND 1 TABLET AT HS       levETIRAcetam (KEPPRA XR) 500 mg 24 hr tablet Take 500 mg by mouth. Take three tablets TID       metoprolol succinate (TOPROL XL) 50 MG 24 hr tablet Take 1 tablet (50 mg total) by mouth daily. 90 tablet 3     simvastatin (ZOCOR) 40 MG tablet TAKE 1 TABLET EVERY DAY 90 tablet 3     No current facility-administered medications for this visit.        No Known Allergies    ROS: complete review of symptoms otherwise negative except as noted below    S: He was doing some work and he felt something pop in his left calf things get worse and then better still some swelling no history of DVT or related problems       O:   Vitals:    06/19/18 1513 06/19/18 1534   BP: 148/88 140/76   Patient Site: Left Arm Left Arm   Patient Position: Sitting Sitting   Cuff Size: Adult Regular Adult Regular  "  Pulse: 62    SpO2: 95%    Weight: (!) 223 lb (101.2 kg)    Height: 5' 10\" (1.778 m)        Physical Exam:  General-  VS- see above  HEENT- neg     Musculoskeletal-  Excellent development of calf musculature the left calf is tender some swelling no evidence of phlebitis good distal pulses-question of plantaris tear will get ultrasound to make sure no DVT          Jasson Ponce MD              "

## 2021-06-19 NOTE — LETTER
Letter by Marie Bravo MD at      Author: Marie Bravo MD Service: -- Author Type: --    Filed:  Encounter Date: 7/19/2019 Status: (Other)         Flaac Curry MD  [address] [place] [address]  [id number]  [place] [address]                                  July 22, 2019    Patient: Jurgen Kirkland   MR Number: [id number]   YOB: 1960   Date of Visit: 7/19/2019     Dear Dr. Antoinette MD:    Thank you for referring Jurgen Kirkland to me for evaluation. Below are the relevant portions of my assessment and plan of care.    If you have questions, please do not hesitate to call me. I look forward to following Jurgen along with you.    Sincerely,        Marie Bravo MD          CC  No Recipients  Marie Bravo MD  7/22/2019  2:50 PM  Addendum  NEUROSURGERY CONSULTATION NOTE    7/19/2019      CHIEF COMPLAINT: Headache, subdural hematoma     HPI:    Jurgen Kirkland is a 59 y.o. male with a past medical history significant for myocardial infarction, CABG who has a known cardiac mural thrombus and has been on chronic Coumadin therapy who is sent to us in consultation by Flaca Curry for further evaluation of subacute subdural hematoma.    Mr. Kirkland notes that on July 6, 2019 he was swimming with his nieces and nephews, when he went underwater he felt a pain and rush of water into his ear followed by pain that spread up into the bilateral temporal region.  He notes he had a severe headache that day that lasted for several days.  He notes that since then has become more mild and is now isolated to the right side of his head.  He denies any other new symptoms.  Denies changes in his vision, numbness, tingling, weakness.  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  His last aura was in the fall 2018 around the time he had his heart attack.    For evaluation of his headaches, he was seen by a physician in his primary care doctor's office who ordered a brain  MRI.  Imaging revealed a subacute subdural hematoma over the right frontal convexity.  His Coumadin was discontinued at that point in time.  He continues on a baby aspirin.    Past Medical History:   Diagnosis Date   ? Alcohol abuse 1990    Remote   ? Chronic kidney disease    ? Hyperlipidemia    ? Hypertension    ? Seizures (H)    ? 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 COLONOSCOPY N/A 2010    normal-q 10 yrs   ? CORONARY ARTERY BYPASS GRAFT      9/18 [facility].    ? CRANIOTOMY N/A 1960    infant with TBI/neurosurgical intervention         REVIEW OF SYSTEMS:  Pt denies changes in his balance, incoordination.  Denies falls.  He denies any recent cranial trauma.  However, he does note that he was having some palpable tenderness on the vertex of his head on his scalp.  He notes that this has resolved over the last week or so. A full 14 point review of systems was otherwise completed and is negative aside from that mentioned above in the HPI    MEDICATIONS:    Current Outpatient Medications   Medication Sig Dispense Refill   ? atorvastatin (LIPITOR) 40 MG tablet Take 1 tablet (40 mg total) by mouth daily. 90 tablet 3   ? cholecalciferol, vitamin D3, 1,000 unit tablet Take 1,000 Units by mouth daily.     ? HYDROcodone-acetaminophen 5-325 mg per tablet Take 1 tablet by mouth every 4 (four) hours as needed for pain. 18 tablet 0   ? lamoTRIgine (LAMICTAL) 200 MG tablet tAKE ONE TABLET IN THE MORNING, 1/2 TAB AT LUNCH AND 1 TABLET AT HS     ? levETIRAcetam (KEPPRA XR) 500 mg 24 hr tablet Take 500 mg by mouth. Take three tablets TID     ? metoprolol succinate (TOPROL XL) 100 MG 24 hr tablet Take 1 tablet (100 mg total) by mouth daily. 90 tablet 3   ? SENNA-S 8.6-50 mg tablet      ? warfarin (COUMADIN/JANTOVEN) 5 MG tablet TAKE 1 TO 1 1/2 TABLETS DAILY AS DIRECTED. ADJUST DOSE PER INR RESULTS. 135 tablet 1     No  current facility-administered medications for this visit.          ALLERGIES/SENSITIVITIES:     Allergies   Allergen Reactions   ? Nsaids (Non-Steroidal Anti-Inflammatory Drug) Nephrotoxicity       PERTINENT SOCIAL HISTORY:   Social History     Socioeconomic History   ? Marital status: Single     Spouse name: Yasmine   ? Number of children: 0   ? Years of education: None   ? Highest education level: None   Occupational History   ? 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 Financial resource strain: None   ? Food insecurity:     Worry: None     Inability: None   ? Transportation needs:     Medical: None     Non-medical: None   Tobacco Use   ? Smoking status: Never Smoker   ? Smokeless tobacco: Never Used   Substance and Sexual Activity   ? Alcohol use: No   ? Drug use: Never   ? Sexual activity: None   Lifestyle   ? Physical activity:     Days per week: None     Minutes per session: None   ? Stress: None   Relationships   ? Social connections:     Talks on phone: None     Gets together: None     Attends Muslim service: None     Active member of club or organization: None     Attends meetings of clubs or organizations: None     Relationship status: None   ? Intimate partner violence:     Fear of current or ex partner: None     Emotionally abused: None     Physically abused: None     Forced sexual activity: None   Other Topics Concern   ? None   Social History Narrative    Single has had girlfriend (RN)-Renée past several years. TBI as infant-fell down steps. Cognitive problems and seizures but finished high school. Refractory seizure disorder goes to [facility]. Recent better control post assisted/disability. Lives with brother (Gary)-primary contact, remote etoh problems; non smoker. No ETOH now. 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 Heart disease Mother    ? Diabetes Mother    ? Heart disease Sister    ? Heart disease Father         PHYSICAL EXAM:     Constitution: /74   Pulse 78   Resp 16   Ht 5' 10\" (1.778 m)   Wt 220 lb (99.8 kg)   BMI 31.57 kg/m   .   Awake, alert and in NAD  Eyes: Conjugate gaze. Conjunctiva benign without icterus or injection  Heart: RRR  Lungs: Non-labored respiration without accessory muscle use  Skin: No obvious rash or lesion  Psych: Appropriate mood and affect, alert and oriented x 3  Mental Status:  Speech is fluent.  Recent and remote memory are intact.  Attention span and concentration are normal.     Cranial Nerves:  CN2: No funduscopic exam performed. CN3,4 & 6: Pupillary light response, lateral and vertical gaze normal.  No nystagmus.  Visual fields are full to confrontation. CN5: Intact to touch CN7: No facial weakness, smile, facial symmetry intact. CN8: Intact to spoken voice. CN9&10: Gag reflex, uvula midline, palate rises with phonation. CN11: Shoulder shrug 5/5 intact bilaterally. CN12: Tongue midline and moves freely from side to side.     Motor: No pronator drift of upper extremity. Normal bulk and tone all muscle groups of upper and lower extremities. Strength is 5/5 in all muscle groups of the bilateral upper and lower extremities      Sensory: Sensation intact bilaterally to light touch and temperature throughout. Vibratory sense is intact in the bl great toe.     Coordination:  Gait is WNL. Pt is able to heel and toe walk without difficulty. Tandem gait is WNL. HOLLI in the UE demonstrates mild incoordination within the right upper extremity     Reflexes; 2+ supinator, biceps, triceps. 2+ patellar and achilles. No ortiz. No clonus. Toes are down-going bilaterally    IMAGING: I personally reviewed all radiographic images    Brain MRI 7/17/2019: Subacute subdural hematoma overlying the right frontal convexity measures approximately 9 mm in greatest dimension.  There is focal " mass-effect with effacement of the sulci.  Minimal midline shift, approximately 2 mm.  No signs of abnormal contrast enhancement.    CONSULTATION ASSESSMENT AND PLAN:    Jurgen Kirkland is a 59-year-old male with a past medical history significant for coronary artery disease, myocardial infarction, CABG, with a history of cardiac mural thrombus who is been on chronic Coumadin therapy who is been having several weeks of headaches and was found to have a subacute subdural hematoma overlying the right frontal convexity.    Currently the subdural hematoma is of a size that is borderline for surgical intervention.  Given that Jurgen his symptoms have improved over the last several weeks, my hope would be that the subdural has the potential to get smaller.  However, given his recent Coumadin and aspirin use, the tendency in these instances are is for the subdural to get bigger over time.  I noted that if this is the case, he has ongoing compression of the underlying brain, that I would recommend gaurang holes for evacuation of the subdural hematoma.    Plan is to repeat his head CT on Monday 7/22/2019 to see if there is been any interval progression.  We will also repeat his coags at this point in time to ensure that his INR normalizes. I anticipate will follow this quite closely until we see either signs of resolution, or progression with an increase in size and worsening symptoms.  I do believe that he at high risk for needing surgical intervention for evacuation of the subdural hematoma at some point in time within the next 2 weeks.    I spent more than 60 minutes in this apt, examining the pt, reviewing the scans, reviewing notes from chart, discussing treatment options with risks and benefits and coordinating care. >50 % clinic time was spent in face to face counseling and coordinating care    Marie Bravo MD      Cc:   Jasson Abdul MD    ------------------------------------  ADDENDUM:     Head CT performed today  (7/22/2019) demonstrates a stable right convexity subdural hematoma per my review.  Radiology feels as though there has been a decrease in the size of the subdural.  I contacted Mr. Kirkland who states that his headaches have improved and he is feeling better today when compared to last week.  He denies any new symptoms.  Given that his labs are normal, he feels as though his symptoms are resolving, my plan would be to follow this closely and repeat a head CT in 1 week's time.  He is in agreement with this plan.    Marie Bravo MD  07/22/19

## 2021-06-19 NOTE — LETTER
Letter by Marie Bravo MD at      Author: Marie Bravo MD Service: -- Author Type: --    Filed:  Encounter Date: 7/19/2019 Status: (Other)         Marcela Mary PA-C    [address] [place] [address]    [phone] (Fax)                                   July 22, 2019    Patient: Jurgen Kirkland   MR Number: [id number]   YOB: 1960   Date of Visit: 7/19/2019     Dear Marcela Mary,    I recently saw Mr. Kirkland in consultation regarding a newly identified intracranial subacute subdural hematoma. It currently is of a size where we are able to monitor it and hopefully see gradual resolution. I performed a repeat head CT today that shows the subdural hematoma is stable per my review; my radiologist feels as though the subdural has mildly decreased in size. Mr. Kirkland feels as though he is clinically improving. We currently have him holding his aspirin and coumadin in light of the intracranial hemorrhage and potential need for surgical intervention if this does not spontaneously resolve. I know that he has a history of cardiac mural thrombus for which he was being managed with coumadin. Given his potential need for cranial surgery, I wanted to touch base with you to see if there would be any further testing or imaging required on your end and also to ensure you have no concerns with him being off of anticoagulation for several weeks time.    I have included my assessment and last clinic note for your review.  If you have questions, please do not hesitate to call me. My cell phone number is [phone].  I look forward to following Jurgen along with you.    Sincerely,        Marie Bravo MD   [facility]          CC  VERONIKA Campbell Angelique, MD  7/22/2019  3:03 PM  Addendum  NEUROSURGERY CONSULTATION NOTE    7/19/2019      CHIEF COMPLAINT: Headache, subdural hematoma     HPI:    Jurgen Kirkland is a 59 y.o. male with a past  medical history significant for myocardial infarction, CABG who has a known cardiac mural thrombus and has been on chronic Coumadin therapy who is sent to us in consultation by Flaca Curry for further evaluation of subacute subdural hematoma.    Mr. Kirkland notes that on July 6, 2019 he was swimming with his nieces and nephews, when he went underwater he felt a pain and rush of water into his ear followed by pain that spread up into the bilateral temporal region.  He notes he had a severe headache that day that lasted for several days.  He notes that since then, his headache has become more mild and is now isolated to the right side of his head.  He denies any other new symptoms.  Denies changes in his vision, numbness, tingling, weakness.  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  His last aura was in the fall 2018 around the time he had his heart attack.    For evaluation of his headaches, he was seen by a physician in his primary care doctor's office who ordered a brain MRI.  Imaging revealed a subacute subdural hematoma over the right frontal convexity.  His Coumadin was discontinued at that point in time.  He continues on a baby aspirin.    Past Medical History:   Diagnosis Date   ? Alcohol abuse 1990    Remote   ? Chronic kidney disease    ? Hyperlipidemia    ? Hypertension    ? Seizures (H)    ? 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 COLONOSCOPY N/A 2010    normal-q 10 yrs   ? CORONARY ARTERY BYPASS GRAFT      9/18 [facility].    ? CRANIOTOMY N/A 1960    infant with TBI/neurosurgical intervention         REVIEW OF SYSTEMS:  Pt denies changes in his balance, incoordination.  Denies falls.  He denies any recent cranial trauma.  However, he does note that he was having some palpable tenderness on the vertex of his head on his scalp.  He notes that this  has resolved over the last week or so. A full 14 point review of systems was otherwise completed and is negative aside from that mentioned above in the HPI    MEDICATIONS:    Current Outpatient Medications   Medication Sig Dispense Refill   ? atorvastatin (LIPITOR) 40 MG tablet Take 1 tablet (40 mg total) by mouth daily. 90 tablet 3   ? cholecalciferol, vitamin D3, 1,000 unit tablet Take 1,000 Units by mouth daily.     ? HYDROcodone-acetaminophen 5-325 mg per tablet Take 1 tablet by mouth every 4 (four) hours as needed for pain. 18 tablet 0   ? lamoTRIgine (LAMICTAL) 200 MG tablet tAKE ONE TABLET IN THE MORNING, 1/2 TAB AT LUNCH AND 1 TABLET AT HS     ? levETIRAcetam (KEPPRA XR) 500 mg 24 hr tablet Take 500 mg by mouth. Take three tablets TID     ? metoprolol succinate (TOPROL XL) 100 MG 24 hr tablet Take 1 tablet (100 mg total) by mouth daily. 90 tablet 3   ? SENNA-S 8.6-50 mg tablet      ? warfarin (COUMADIN/JANTOVEN) 5 MG tablet TAKE 1 TO 1 1/2 TABLETS DAILY AS DIRECTED. ADJUST DOSE PER INR RESULTS. 135 tablet 1     No current facility-administered medications for this visit.          ALLERGIES/SENSITIVITIES:     Allergies   Allergen Reactions   ? Nsaids (Non-Steroidal Anti-Inflammatory Drug) Nephrotoxicity       PERTINENT SOCIAL HISTORY:   Social History     Socioeconomic History   ? Marital status: Single     Spouse name: Yasmine   ? Number of children: 0   ? Years of education: None   ? Highest education level: None   Occupational History   ? 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 Financial resource strain: None   ? Food insecurity:     Worry: None     Inability: None   ? Transportation needs:     Medical: None     Non-medical: None   Tobacco Use   ? Smoking status: Never Smoker   ? Smokeless tobacco: Never Used   Substance and Sexual Activity   ? Alcohol use: No   ? Drug use: Never   ? Sexual activity: None   Lifestyle   ? Physical  "activity:     Days per week: None     Minutes per session: None   ? Stress: None   Relationships   ? Social connections:     Talks on phone: None     Gets together: None     Attends Yarsani service: None     Active member of club or organization: None     Attends meetings of clubs or organizations: None     Relationship status: None   ? Intimate partner violence:     Fear of current or ex partner: None     Emotionally abused: None     Physically abused: None     Forced sexual activity: None   Other Topics Concern   ? None   Social History Narrative    Single has had girlfriend (RN)-Renée past several years. TBI as infant-fell down steps. Cognitive problems and seizures but finished high school. Refractory seizure disorder goes to [place] [facility]. Recent better control post long term/disability. Lives with brother (Gary)-primary contact, remote etoh problems; non smoker. No ETOH now. 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 Heart disease Mother    ? Diabetes Mother    ? Heart disease Sister    ? Heart disease Father         PHYSICAL EXAM:     Constitution: /74   Pulse 78   Resp 16   Ht 5' 10\" (1.778 m)   Wt 220 lb (99.8 kg)   BMI 31.57 kg/m   .   Awake, alert and in NAD  Eyes: Conjugate gaze. Conjunctiva benign without icterus or injection  Heart: RRR  Lungs: Non-labored respiration without accessory muscle use  Skin: No obvious rash or lesion  Psych: Appropriate mood and affect, alert and oriented x 3  Mental Status:  Speech is fluent.  Recent and remote memory are intact.  Attention span and concentration are normal.     Cranial Nerves:  CN2: No funduscopic exam performed. CN3,4 & 6: Pupillary light response, lateral and vertical gaze normal.  No nystagmus.  Visual fields are full to confrontation. CN5: Intact to touch CN7: No facial weakness, smile, facial symmetry intact. CN8: Intact to " spoken voice. CN9&10: Gag reflex, uvula midline, palate rises with phonation. CN11: Shoulder shrug 5/5 intact bilaterally. CN12: Tongue midline and moves freely from side to side.     Motor: No pronator drift of upper extremity. Normal bulk and tone all muscle groups of upper and lower extremities. Strength is 5/5 in all muscle groups of the bilateral upper and lower extremities      Sensory: Sensation intact bilaterally to light touch and temperature throughout. Vibratory sense is intact in the bl great toe.     Coordination:  Gait is WNL. Pt is able to heel and toe walk without difficulty. Tandem gait is WNL. HOLLI in the UE demonstrates mild incoordination within the right upper extremity     Reflexes; 2+ supinator, biceps, triceps. 2+ patellar and achilles. No ortiz. No clonus. Toes are down-going bilaterally    IMAGING: I personally reviewed all radiographic images    Brain MRI 7/17/2019: Subacute subdural hematoma overlying the right frontal convexity measures approximately 9 mm in greatest dimension.  There is focal mass-effect with effacement of the sulci.  Minimal midline shift, approximately 2 mm.  No signs of abnormal contrast enhancement.    CONSULTATION ASSESSMENT AND PLAN:    Jurgen Kirkland is a 59-year-old male with a past medical history significant for coronary artery disease, myocardial infarction, CABG, with a history of cardiac mural thrombus who is been on chronic Coumadin therapy who is been having several weeks of headaches and was found to have a subacute subdural hematoma overlying the right frontal convexity.    Currently the subdural hematoma is of a size that is borderline for surgical intervention.  Given that Jurgen his symptoms have improved over the last several weeks, my hope would be that the subdural has the potential to get smaller.  However, given his recent Coumadin and aspirin use, the tendency in these instances are is for the subdural to get bigger over time.  I noted that  if this is the case, he has ongoing compression of the underlying brain, that I would recommend gaurang holes for evacuation of the subdural hematoma.    Plan is to continue to hold his coumadin, hold his aspirin and repeat his head CT on Monday 7/22/2019 to see if there is been any interval progression.  We will also repeat his coags at this point in time to ensure that his INR normalizes. I anticipate will follow this quite closely until we see either signs of resolution, or progression with an increase in size and worsening symptoms.  I do believe that he at high risk for needing surgical intervention for evacuation of the subdural hematoma at some point in time within the next 2 weeks.    I spent more than 60 minutes in this apt, examining the pt, reviewing the scans, reviewing notes from chart, discussing treatment options with risks and benefits and coordinating care. >50 % clinic time was spent in face to face counseling and coordinating care    Marie Bravo MD      Cc:   Jasson Abdul MD    ------------------------------------  ADDENDUM:     Head CT performed today (7/22/2019) demonstrates a stable right convexity subdural hematoma per my review.  Radiology feels as though there has been a decrease in the size of the subdural.  I contacted Mr. Kirkland who states that his headaches have improved and he is feeling better today when compared to last week.  He denies any new symptoms.  Given that his labs are normal, he feels as though his symptoms are resolving, my plan would be to follow this closely and repeat a head CT in 1 week's time.  He is in agreement with this plan.    Marei Bravo MD  07/22/19

## 2021-06-19 NOTE — PROGRESS NOTES
Jurgen Kirkland  1960      Assessment and Plan:  1 hypertension good control continue same medication  2 lower right eyelid skin lesion suspicious basal cell it appears to be growing will get him in to see ophthalmology  3 knee arthritis improving see note from orthopedic consultation popliteal cyst and some internal derangement but symptomatically things are going better  4 return to clinic 6 months sooner if problems or difficulties  5 history of mild azotemia he needs to avoid NSAIDs we discussed that again today      Chief Complaint: Follow-up    Visit diagnoses:    1. Benign essential hypertension     2. Lesion of lower eyelid  Ambulatory referral to Ophthalmology       Meds:  Current Outpatient Prescriptions   Medication Sig Dispense Refill     aspirin 81 MG EC tablet Take 81 mg by mouth daily.       atenolol (TENORMIN) 50 MG tablet TAKE 1 TABLET EVERY DAY 90 tablet 1     cholecalciferol, vitamin D3, 1,000 unit tablet Take 1,000 Units by mouth daily.       lamoTRIgine (LAMICTAL) 200 MG tablet tAKE ONE TABLET IN THE MORNING, 1/2 TAB AT LUNCH AND 1 TABLET AT HS       levETIRAcetam (KEPPRA XR) 500 mg 24 hr tablet Take 500 mg by mouth. Take three tablets TID       metoprolol succinate (TOPROL XL) 50 MG 24 hr tablet Take 1 tablet (50 mg total) by mouth daily. 90 tablet 3     simvastatin (ZOCOR) 40 MG tablet TAKE 1 TABLET EVERY DAY 90 tablet 2     No current facility-administered medications for this visit.        No Known Allergies    ROS: complete review of symptoms otherwise negative except as noted below    S: History mainly for blood pressure check and his knee is improving see orthopedic note I do notice that he is got enlarging skin lesion on his right lower eyelid somewhat suspicious for basal cell it is papular shaped irregularity     O:   Vitals:    07/11/18 0917   BP: 130/78   Patient Site: Left Arm   Patient Position: Sitting   Cuff Size: Adult Large   Pulse: 68   SpO2: 98%   Weight: 221 lb (100.2  kg)       Physical Exam:  General-  VS- see above  HEENT- neg   Neck- no adenopathy/thyromegaly/bruits  Chest- clear   Cor- reg no murmurs/gallops/ectopics    Skin-right lower eyelid lesion papular in appearance suspicious for possible basal cell will refer to ophthalmology          Jasson Ponce MD

## 2021-06-19 NOTE — LETTER
Letter by Jasson Abdul MD at      Author: Jasson Abdul MD Service: -- Author Type: --    Filed:  Encounter Date: 6/10/2019 Status: (Other)         Jurgen Kirkland  [address] [place] [address]       Adwoa 10, 2019     Dear Mr. Kirkland,    Below are the results from your recent visit:    Resulted Orders   Comprehensive Metabolic Panel   Result Value Ref Range    Sodium 141 136 - 145 mmol/L    Potassium 4.8 3.5 - 5.0 mmol/L    Chloride 111 (H) 98 - 107 mmol/L    CO2 22 22 - 31 mmol/L    Anion Gap, Calculation 8 5 - 18 mmol/L    Glucose 74 70 - 125 mg/dL    BUN 35 (H) 8 - 22 mg/dL    Creatinine 1.83 (H) 0.70 - 1.30 mg/dL    GFR MDRD Af Amer 46 (L) >60 mL/min/1.73m2    GFR MDRD Non Af Amer 38 (L) >60 mL/min/1.73m2    Bilirubin, Total 0.4 0.0 - 1.0 mg/dL    Calcium 10.3 8.5 - 10.5 mg/dL    Protein, Total 6.8 6.0 - 8.0 g/dL    Albumin 4.1 3.5 - 5.0 g/dL    Alkaline Phosphatase 90 45 - 120 U/L    AST 30 0 - 40 U/L    ALT 37 0 - 45 U/L    Narrative    Fasting Glucose reference range is 70-99 mg/dL per  American Diabetes Association (ADA) guidelines.   LDL Cholesterol, Direct   Result Value Ref Range    Direct  (H) <=129 mg/dl   PSA, Annual Screen (Prostatic-Specific Antigen)   Result Value Ref Range    PSA 0.8 0.0 - 4.5 ng/mL    Narrative    Method is Abbott Prostate-Specific Antigen (PSA)  Standard-WHO 1st International (90:10)   HM1 (CBC with Diff)   Result Value Ref Range    WBC 4.7 4.0 - 11.0 thou/uL    RBC 5.36 4.40 - 6.20 mill/uL    Hemoglobin 17.6 14.0 - 18.0 g/dL    Hematocrit 51.8 40.0 - 54.0 %    MCV 97 80 - 100 fL    MCH 32.8 27.0 - 34.0 pg    MCHC 33.9 32.0 - 36.0 g/dL    RDW 11.1 11.0 - 14.5 %    Platelets 174 140 - 440 thou/uL    MPV 6.8 (L) 7.0 - 10.0 fL    Neutrophils % 53 50 - 70 %    Lymphocytes % 36 20 - 40 %    Monocytes % 9 2 - 10 %    Eosinophils % 2 0 - 6 %    Basophils % 1 0 - 2 %    Neutrophils Absolute 2.5 2.0 - 7.7 thou/uL    Lymphocytes Absolute 1.7 0.8 - 4.4 thou/uL     Monocytes Absolute 0.4 0.0 - 0.9 thou/uL    Eosinophils Absolute 0.1 0.0 - 0.4 thou/uL    Basophils Absolute 0.0 0.0 - 0.2 thou/uL     Your tests are satisfactory.  The creatinine is the kidney test and it has not changed, or worsened.  The LDL cholesterol is higher than I would expect.  I suspect that is due to you not being fasting perhaps.  We should re-check a fasting (nothing to eat or drink for 12 hours).  I can put an order in.  You can come in any time you want to do that.  You need to call first to let them know the day you are coming in however.  No rush but we should be sure you are having good control.     Please call with questions or contact us using [employer].    Sincerely,      Electronically signed by Jasson Abdul MD

## 2021-06-19 NOTE — LETTER
Letter by Marie Bravo MD at      Author: Marie Bravo MD Service: -- Author Type: --    Filed:  Encounter Date: 8/1/2019 Status: (Other)       Dear Mr. Kirkland,    This letter will help in preparation for your upcoming surgery. Please contact us with any additional questions you may have regarding your surgery. Contact information for your surgery scheduler:   Tre Chan, and Flor: [phone] ~ Razia Bravo and Mirela: [phone] ~ Mickey    You are scheduled for: [place] [facility]/Evacuation of Subdural Hematoma   With: Marie Brvao M.D.  Date/Time: Friday, August 2, 2019 at 11:30 a.m. (time subject to change)  Location: [place] [address]    Check in at the Welcome Desk inside the main doors of the hospital. An escort will take you to the surgery waiting area. A nurse from [facility] (surgery admit unit) at the hospital will call you with your exact arrival time to the hospital - typically one-and-a-half to two-and-a-half hours prior to your scheduled surgery time.     In the event of an emergency surgery case, there may be an adjustment to your start time for surgery.     PREPARING FOR YOUR SURGERY    *Pre-op Physical: Today, 08/01/2019, at 2:00 p.m., with Dr. Gonzalez at the [facility]. Please arrive at 1:45 p.m..     *Please discuss the necessity of receiving a pneumococcal vaccine prior to surgery at your pre-op physical. Recommended for all patients over the age of 65, or based on certain medical conditions.     *After the pre-op physical is complete, please have your clinic fax the visit note to your surgery scheduler at [phone].    *Pre-op Lab Work: Completed on 07/22/2019    *Ensure that you have completed your pre-op physical, along with any other necessary tests/appointments (listed above), prior to your Readiness Visit.                     ADDITIONAL INFORMATION REGARDING YOUR  SURGERY    Medications    Bring a list ALL of your medications, including any over-the-counter vitamins and herbal supplements to your Readiness Visit, and on the day of surgery.    DO NOT bring your medications with you the day of surgery.    Please see attached third sheet for more details on medications/vitamins/herbal supplements that should be discontinued prior to your surgery date.     If you are unsure if you should discontinue a medication/ vitamin/herbal supplement, please call our office and discuss with a nurse.    Continue taking your medications/vitamins/herbal supplements unless they are on the attached list.     Failure to follow the instructions regarding medications/vitamins/herbal supplements will result in cancellation of your surgery.    Day BEFORE Surgery    DO NOT shave near your surgical site. This can cause irritation of the skin    Using a washcloth and provided bottle of Hibiclens, shower the night BEFORE surgery, using a half bottle of Hibiclens to wash your body, avoiding face and genitals. The morning OF surgery, shower and use the second half of the bottle to wash your body, avoiding face and genitals. If you are unable to take a shower the morning of surgery, please discuss your options with the nurse at your readiness visit.     NOTHING  to eat after 11:00 p.m. the night prior to your procedure    CLEAR LIQUIDS: May have the following liquids up to two (2) hours before your arrival time at the hospital: water, plain black coffee (no cream or milk), plain black tea or plain green tea (no cream or milk), Gatorade or Propel Water.    SMOKING: Stop smoking as far before surgery as possible, or as directed by your surgeon. NO tobacco products of any kind (cigarettes, e-cigarettes, chewing tobacco) beginning at 11:00 p.m. the night prior to your procedure.     ALCOHOL: You should stop drinking alcohol beginning at 11:00 p.m. the night prior to your procedure    Contact our office if you  have symptoms of illness such as a fever of 101 or greater, chills, cough, sore throat, or if you develop a rash or any open sore    Day OF Surgery    If youve been instructed to take a medication(s) on the morning of surgery, please take with a very small sip of water.    Wear loose & comfortable clothing and flat shoes, Leave jewelry/valuables at home. If you wear contact lenses, remove them at home and wear glasses. Remove any body piercings. Remove nail polish.     Planning for Discharge    Start planning for your care after discharge as soon as you receive this letter.    If you have not made arrangements to have someone take you home and stay with you for the first 48 hours after discharge, your surgery will be cancelled.        PRE-OPERATIVE MEDICATION INSTRUCTIONS  Review this information with your primary care physician prior to discontinuing any of the medications listed below.  Notify your primary care physician that you have been instructed to discontinue these medications.    TEN (10) Days Prior to Surgery, STOP the Following Medications   [place]  Anacin  Aspirin  Excedrin  Pepto Bismol    **Before taking ANY over-the-counter medications, check the label for Aspirin   Non-steroidal   Anti-inflammatory Medications (NSAIDS)    Celebrex  Diclofenac (Cataflam)  Etodolac (Iodine)  Fenoprofen (Nalfon)  Ibuprofen (Advil, Motrin, Nuprin)  Indomethacin (Indocin)  Ketoprofen  Ketorolac (Toradol)  Melaxicam (Mobic)  Naproxen (AnaProx, Aleve, Naprosyn)  Relafen (Nabumetone)   Herbal Supplements (this is a partial list of herbals to be discontinued)    Bárbara Young  Ephedra  Feverfew  Fish Oil  Flaxseed Oil  Garlic  Anais  Gingko  Ginseng  Goldenseal  Imitrex (Sumatriptan)  Kava  Krill Oil  Licorice  Multi Vitamins  [facility]  Valerian  Vitamin E  Yohimbe   CHECK WITH YOUR PRESCRIBING DOCTOR BEFORE STOPPING ANY BLOOD THINNERS (approximately 7 days prior to surgery)  (Coumadin, Plavix, Platel,  Aggrenox, Effient (Prasugrel), Ticlid), Xarelto, and Pradaxa      ALWAYS CHECK WITH YOUR PRESCRIBING DOCTOR REGARDING THE MEDICATIONS LISTED BELOW; RECOMMENDED STOP TIME IS ALSO LISTED      If you are taking Lovenox, discontinue 24 HOURS prior to surgery    If you are taking weight loss medication, discontinue 7 days prior to surgery    If you are taking Metformin or Simvastatin, check with your primary care physician (or whoever has prescribed you this medication) regarding when to discontinue prior to surgery       Snapt and traditional parking is located at [facility] at [address] [place] [address]. Validation is done at the Welcome Desk in the [facility].

## 2021-06-20 NOTE — PROGRESS NOTES
ASSESSMENT AND PLAN:    1. CAD (coronary artery disease)  Recent CABS, stable.  Sternal would is intact and healing.  I have reviewed his current medications and recommend no changes at this time.   - Hemoglobin    2. Mixed hyperlipidemia  On treatment.     3. Epilepsy (H)  Long history, related to CHI in youth.  He reports having seizures while in the hospital this hospitalization, on medication currently.     4. Chronic Kidney Disease, Stage 3  Will assess.   - Basic Metabolic Panel    5. Traumatic brain injury, without loss of consciousness, subsequent encounter  History of same, in childhood.     6. Chronic systolic congestive heart failure (H)  EF 30% on echo, recent hospitalization.     7. Apical mural thrombus  Noted on recent echo and hospitalization.  Now on warfarin.  Has follow up with Cardiology at [facility].  They will decide on length of therapy.  Will maintain.   - INR  - Ambulatory referral to Anticoagulation Monitoring    Follow up with Dr. Ponce in one month.     CHIEF COMPLAINT:  Chief Complaint   Patient presents with     Follow-up     DOD 10/7/18     Immunizations     Flu  shot     HISTORY OF PRESENT ILLNESS:  Jurgen Kirkland is a 58 y.o. male with recent STEMI requiring CABS, at [facility].  Found to have systolic heart failure, EF 30% and an apical thrombus, and so is on warfarin.  No symptoms of bleeding, or chest pain, or nausea or increased shortness of breath.      REVIEW OF SYSTEMS:   See HPI, all other pertinent systems on review are negative.    Active Ambulatory Problems     Diagnosis Date Noted     Cognitive Functions Current Level Impaired      Epilepsy And Recurrent Seizures      Benign essential hypertension      Chronic Kidney Disease, Stage 3      Hyperlipemia      Traumatic Brain Injury      CAD (coronary artery disease) 09/10/2018     Apical mural thrombus 09/10/2018     Resolved Ambulatory Problems     Diagnosis Date Noted     Alcohol Abuse      Head Injury - With Subdural  Hemorrhage      Noncompliance With Therapy      Ankle Sprain      Renal Insufficiency      Abrasion of scrotum 10/17/2016     Past Medical History:   Diagnosis Date     Alcohol abuse 1990     Chronic kidney disease      Hyperlipidemia      Hypertension      Seizures (H)      TBI (traumatic brain injury) (H) 1960       Past Surgical History:   Procedure Laterality Date     COLONOSCOPY N/A 2010    normal-q 10 yrs     CRANIOTOMY N/A 1960    infant with TBI/neurosurgical intervention     VITALS:  Vitals:    09/10/18 1309   BP: 134/80   Patient Site: Left Arm   Patient Position: Sitting   Cuff Size: Adult Regular   Pulse: 70   SpO2: 98%   Weight: 215 lb (97.5 kg)     Wt Readings from Last 3 Encounters:   09/10/18 215 lb (97.5 kg)   07/11/18 221 lb (100.2 kg)   06/19/18 (!) 223 lb (101.2 kg)     PHYSICAL EXAM:  Constitutional:  Well appearing in NAD, alert and oriented  Neck:  Supple, no significant adenopathy.  Cardiac:  S1 S2 without murmur  Chest Wall:  Healing, intact sternal wound  Lungs: Clear to auscultation  Abdomen:   Soft, flat and non-tender, without  guarding, rebound, or mass.      DECISION TO OBTAIN OLD RECORDS AND/OR OBTAIN HISTORY FROM SOMEONE OTHER THAN PATIENT, AND/OR ACCESSING CARE EVERYWHERE):  1  0     REVIEW AND SUMMARIZATION OF OLD RECORDS, AND/OR OBTAINING HISTORY FROM SOMEONE OTHER THAN PATIENT, AND/OR DISCUSSION OF CASE WITH ANOTHER HEALTH CARE PROVIDER:  2 reviewed Region's records of care, reason for anticoagulation.     REVIEW AND/OR ORDER OF OF CLINICAL LAB TESTS: 1  INR and BMP    REVIEW AND/OR ORDER OF RADIOLOGY TESTS: 1 0.    REVIEW AND/OR ORDER OF MEDICAL TESTS (EKG/ECHO/COLONOSCOPY/EGD): 1  0    INDEPENDENT  VISUALIZATION OF IMAGE, TRACING, OR SPECIMEN ITSELF (2 EACH):  0     TOTAL: 3    Current Outpatient Prescriptions   Medication Sig Dispense Refill     aspirin 81 MG EC tablet Take 81 mg by mouth daily.       atenolol (TENORMIN) 50 MG tablet TAKE 1 TABLET EVERY DAY 90 tablet 1      cholecalciferol, vitamin D3, 1,000 unit tablet Take 1,000 Units by mouth daily.       lamoTRIgine (LAMICTAL) 200 MG tablet tAKE ONE TABLET IN THE MORNING, 1/2 TAB AT LUNCH AND 1 TABLET AT HS       levETIRAcetam (KEPPRA XR) 500 mg 24 hr tablet Take 500 mg by mouth. Take three tablets TID       metoprolol succinate (TOPROL-XL) 50 MG 24 hr tablet Take 1 tablet (50 mg total) by mouth daily. 90 tablet 3     simvastatin (ZOCOR) 40 MG tablet TAKE 1 TABLET EVERY DAY 90 tablet 2     atorvastatin (LIPITOR) 40 MG tablet        cephalexin (KEFLEX) 250 MG capsule        furosemide (LASIX) 20 MG tablet        levETIRAcetam (KEPPRA) 500 MG tablet        MAPAP, ACETAMINOPHEN, 325 mg tablet        oxyCODONE (ROXICODONE) 5 MG immediate release tablet Take 1 tablet (5 mg total) by mouth every 8 (eight) hours as needed for pain. 20 tablet 0     SENNA-S 8.6-50 mg tablet        warfarin (COUMADIN) 5 MG tablet        No current facility-administered medications for this visit.      Jasson Abdul MD  Internal Medicine  [facility]

## 2021-06-20 NOTE — PROGRESS NOTES
Jurgen Kirkland  1960      Assessment and Plan:  1. Post CABG @ [facility]/Sept - good recovery goes to [facility] cardiology/CV surgery  2. Mural thrombosis- chronic warfarin till advised otherwise  3. HTN controlled  4. Seizure disorder- stable  5. Hyperlipemia- same  6. CKD 3  7. meds reviewed stressed compliance which had been issue in past/   8. RTC 4-6 weeks/ follow up with [facility] as planned      Chief Complaint: f/u    Visit diagnoses:    1. Hyperlipemia  atorvastatin (LIPITOR) 40 MG tablet   2. CAD, multiple vessel     3. Epilepsy (H)     4. Warfarin anticoagulation  warfarin (COUMADIN) 5 MG tablet   5. 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; 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; burrholes-seizures     Past Surgical History:   Procedure Laterality Date     COLONOSCOPY N/A 2010    normal-q 10 yrs     CRANIOTOMY N/A 1960    infant with TBI/neurosurgical intervention     Social History     Social History     Marital status: Single     Spouse name: Yasmine     Number of children: 0     Years of education: N/A     Occupational History     disabled      previously manual type labor/aggravated seizure disorder     Social History Main Topics     Smoking status: Never Smoker     Smokeless tobacco: Never Used     Alcohol use No     Drug use: Not on file     Sexual activity: Not on file     Other Topics Concern     Not on file     Social History Narrative    Single  has had girlfriend (RN)-Renée past several years. TBI as infant-fell down steps. Cognitive problems and seizures but finished high school. Refractory seizure disorder goes to [facility]. Recent better control post group home/disability. Lives with brother (Gary)-primary contact, remote etoh problems; non smoker. No ETOH now. 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       atenolol (TENORMIN) 50 MG tablet TAKE 1 TABLET EVERY DAY 90 tablet 1     cephalexin (KEFLEX) 250 MG capsule        cholecalciferol, vitamin D3, 1,000 unit tablet Take 1,000 Units by mouth daily.       furosemide (LASIX) 20 MG tablet        lamoTRIgine (LAMICTAL) 200 MG tablet tAKE ONE TABLET IN THE MORNING, 1/2 TAB AT LUNCH AND 1 TABLET AT HS       levETIRAcetam (KEPPRA XR) 500 mg 24 hr tablet Take 500 mg by mouth. Take three tablets TID       levETIRAcetam (KEPPRA) 500 MG tablet        MAPAP, ACETAMINOPHEN, 325 mg tablet        metoprolol succinate (TOPROL-XL) 50 MG 24 hr tablet Take 1 tablet (50 mg total) by mouth daily. 90 tablet 3     oxyCODONE (ROXICODONE) 5 MG immediate release tablet Take 1 tablet (5 mg total) by mouth every 8 (eight) hours as needed for pain. 20 tablet 0     SENNA-S 8.6-50 mg tablet        simvastatin (ZOCOR) 40 MG tablet TAKE 1 TABLET EVERY DAY 90 tablet 2     atorvastatin (LIPITOR) 40 MG tablet Take 1 tablet (40 mg total) by mouth daily. 90 tablet 3     warfarin (COUMADIN) 5 MG tablet One daily or as directed for blood thinner 30 tablet 3     No current facility-administered medications for this visit.        Allergies   Allergen Reactions     Nsaids (Non-Steroidal Anti-Inflammatory Drug) Nephrotoxicity       ROS: complete review of symptoms  otherwise negative except as noted below    S:  CABG emergent labor day weekend. Nice recovery goes to [facility] clinic on warfarin for apical mural thrombus. Exercise endurance adequate. Due for INR        O:   Vitals:    09/24/18 1053   BP: 138/76   Patient Site: Left Arm   Patient Position: Sitting   Cuff Size: Adult Regular   Pulse: 72   SpO2: 96%   Weight: 213 lb 9 oz (96.9 kg)       Physical Exam:  General-  VS- see above  HEENT- neg   Neck- no adenopathy/thyromegaly/bruits  Chest- clear   Cor- reg no murmurs/gallops/ectopics  Extremities: no edema, good distal pulses  Neuro- Cr. NN-  intact, alert,   Abdomen- soft non tender, no masses; no organomegaly   -          Jasson Ponce MD      Recent Results (from the past 240 hour(s))   INR   Result Value Ref Range    INR 2.70 (H) 0.90 - 1.10   INR   Result Value Ref Range    INR 3.80 (H) 0.90 - 1.10   INR   Result Value Ref Range    INR 3.30 (H) 0.90 - 1.10     Lab Results   Component Value Date    CHOL 210 (H) 07/24/2017    CHOL 233 (H) 07/27/2016    CHOL 196 07/23/2015     Lab Results   Component Value Date    HDL 34 (L) 07/24/2017    HDL 39 (L) 07/27/2016    HDL 36 (L) 07/23/2015     Lab Results   Component Value Date    LDLCALC 152 (H) 07/24/2017    LDLCALC 170 (H) 07/27/2016    LDLCALC 137 (H) 07/23/2015     Lab Results   Component Value Date    TRIG 120 07/24/2017    TRIG 122 07/27/2016    TRIG 117 07/23/2015     No components found for: CHOLHDL

## 2021-06-21 RX ORDER — WARFARIN SODIUM 5 MG/1
TABLET ORAL
Qty: 135 TABLET | Refills: 0 | Status: SHIPPED | OUTPATIENT
Start: 2021-06-21 | End: 2021-11-30

## 2021-06-21 NOTE — LETTER
Letter by Jasson Abdul MD at      Author: Jasson Abdul MD Service: -- Author Type: --    Filed:  Encounter Date: 3/11/2021 Status: (Other)         Jurgen Kirkland  [address] [place] [address]     March 11, 2021     Dear Mr. Kirkland,    Below are the results from your recent visit:    Resulted Orders   Basic Metabolic Panel   Result Value Ref Range    Sodium 139 136 - 145 mmol/L    Potassium 5.3 (H) 3.5 - 5.0 mmol/L    Chloride 107 98 - 107 mmol/L    CO2 23 22 - 31 mmol/L    Anion Gap, Calculation 9 5 - 18 mmol/L    Glucose 58 (LL) 70 - 125 mg/dL    Calcium 9.7 8.5 - 10.5 mg/dL    BUN 35 (H) 8 - 22 mg/dL    Creatinine 2.14 (H) 0.70 - 1.30 mg/dL    GFR MDRD Af Amer 38 (L) >60 mL/min/1.73m2    GFR MDRD Non Af Amer 31 (L) >60 mL/min/1.73m2    Narrative    Fasting Glucose reference range is 70-99 mg/dL per  American Diabetes Association (ADA) guidelines.   PSA, Annual Screen (Prostatic-Specific Antigen)   Result Value Ref Range    PSA 0.9 0.0 - 4.5 ng/mL    Narrative    Method is Abbott Prostate-Specific Antigen (PSA)  Standard-WHO 1st International (90:10)       Your tests are OK. The kidney test, the creatinine is essentially the same, the other abnormalities are not significant.  The prostate cancer test, the PSA, is normal.     Please call with questions or contact us using [employer].    Sincerely,        Electronically signed by Jasson Abdul MD

## 2021-06-21 NOTE — PROGRESS NOTES
Jurgen Kirkland  1960      Assessment and Plan:  1 status post coronary artery bypass grafting at [facility] early September doing excellent  2 chronic anticoagulation with warfarin; monitored by our anticoagulation clinic; evidence of a left ventricular clot was diagnosis for anticoagulation; plan at this time is indefinite anticoagulation unless otherwise directed by cardiac consultants at [facility]  3 complicated seizure disorder currently stable on Lamictal and Keppra  4 chronic kidney disease creatinine has been in the 2 range going back 10 years this was noted by consultants at [facility] will continue to monitor  5 hyperlipidemia under control  6 hypertension under control  7 return to clinic 8 weeks      Chief Complaint: Follow-up blood pressure coronary disease warfarin anticoagulation    Visit diagnoses:    1. Benign essential HTN  metoprolol succinate (TOPROL XL) 100 MG 24 hr tablet    Basic Metabolic Panel   2. Warfarin anticoagulation  INR   3. S/P CABG (coronary artery bypass graft)     4. Chronic Kidney Disease, Stage 3     5. Epilepsy (H)         Meds:  Current Outpatient Prescriptions   Medication Sig Dispense Refill     aspirin 81 MG EC tablet Take 81 mg by mouth daily.       atenolol (TENORMIN) 50 MG tablet TAKE 1 TABLET EVERY DAY 90 tablet 1     atorvastatin (LIPITOR) 40 MG tablet Take 1 tablet (40 mg total) by mouth daily. 90 tablet 3     cholecalciferol, vitamin D3, 1,000 unit tablet Take 1,000 Units by mouth daily.       furosemide (LASIX) 20 MG tablet        lamoTRIgine (LAMICTAL) 200 MG tablet tAKE ONE TABLET IN THE MORNING, 1/2 TAB AT LUNCH AND 1 TABLET AT HS       levETIRAcetam (KEPPRA XR) 500 mg 24 hr tablet Take 500 mg by mouth. Take three tablets TID       oxyCODONE (ROXICODONE) 5 MG immediate release tablet Take 1 tablet (5 mg total) by mouth every 8 (eight) hours as needed for pain. 20 tablet 0     SENNA-S 8.6-50 mg tablet        simvastatin (ZOCOR) 40 MG tablet TAKE 1 TABLET  EVERY DAY 90 tablet 2     warfarin (COUMADIN) 5 MG tablet One daily or as directed for blood thinner 30 tablet 3     metoprolol succinate (TOPROL XL) 100 MG 24 hr tablet Take 1 tablet (100 mg total) by mouth daily. 30 tablet 11     No current facility-administered medications for this visit.        Allergies   Allergen Reactions     Nsaids (Non-Steroidal Anti-Inflammatory Drug) Nephrotoxicity       ROS: complete review of symptoms otherwise negative except as noted below    S: He continues to do well his activity level is excellent he is at cardiac rehab at [facility] and that is going nicely he is on the warfarin no chest pains he points out that they were concerned at [facility] because his creatinine is 1.96 his creatinine has been 2 going back to at least 2009 perhaps longer goal is to continue with blood pressure control   etc. continue to monitor creatinine and avoid NSAIDs  O:   Vitals:    10/22/18 1121   BP: 126/78   Patient Site: Left Arm   Patient Position: Sitting   Cuff Size: Adult Large   Pulse: 60   Resp: 14   Weight: 215 lb 8 oz (97.8 kg)       Physical Exam:  General-  VS- see above  HEENT- neg   Neck- no adenopathy/thyromegaly/bruits  Chest- clear   Cor- reg no murmurs/gallops/ectopics  Extremities: no edema, good distal pulses            Jasson Ponce MD    Recent Results (from the past 240 hour(s))   INR   Result Value Ref Range    INR 2.00 (H) 0.90 - 1.10   INR   Result Value Ref Range    INR 2.20 (H) 0.90 - 1.10

## 2021-06-23 NOTE — TELEPHONE ENCOUNTER
ANTICOAGULATION  MANAGEMENT    Assessment     Today's INR result of 2.9 is Therapeutic (goal INR of 2.0-3.0)        Warfarin taken as previously instructed    No new diet changes affecting INR    No new medication/supplements affecting INR    Continues to tolerate warfarin with no reported s/s of bleeding or thromboembolism     Previous INR was Therapeutic    Plan:     Spoke with Jurgen regarding INR result and instructed:     Warfarin Dosing Instructions:  Continue current warfarin dose 5 mg daily on mon/wed/fri; and 7.5 mg daily rest of week  (0 % change)    Instructed patient to follow up no later than: two weeks        Parrish verbalizes understanding and agrees to warfarin dosing plan.    Instructed to call the [facility] for any changes, questions or concerns. (#[phone])   ?   Dakota Dowling RN    Subjective/Objective:      Jurgen Kirkland, a 58 y.o. male is on warfarin.     Jurgen reports:     Home warfarin dose: verbally confirmed home dose with Parrish and updated on anticoagulation calendar     Missed doses: No     Medication changes:  No     S/S of bleeding or thromboembolism:  No     New Injury or illness:  No     Changes in diet or alcohol consumption:  No     Upcoming surgery, procedure or cardioversion:  No    Anticoagulation Episode Summary     Current INR goal:   2.0-3.0   TTR:   37.0 % (3.9 mo)   Next INR check:   1/28/2019   INR from last check:   2.90 (1/14/2019)   Weekly max warfarin dose:      Target end date:   Indefinite   INR check location:      Preferred lab:      Send INR reminders to:   ANTICOAGULATION POOL A (WBY,WBE,MID,RSC)    Indications    Apical mural thrombus [I51.3]  CAD  multiple vessel [I25.10]           Comments:            Anticoagulation Care Providers     Provider Role Specialty Phone number    Jasson Ponce MD [facility] Internal Medicine [phone]

## 2021-06-23 NOTE — TELEPHONE ENCOUNTER
ANTICOAGULATION  MANAGEMENT    Assessment     Today's INR result of 2.6 is Therapeutic (goal INR of 2.0-3.0)        Warfarin taken as previously instructed    No new diet changes affecting INR    No new medication/supplements affecting INR    Continues to tolerate warfarin with no reported s/s of bleeding or thromboembolism     Previous INR was Therapeutic    Plan:     Spoke with Jurgen regarding INR result and instructed:     Warfarin Dosing Instructions:  Continue current warfarin dose 5 mg daily on mon/wed/fri; and 7.5 mg daily rest of week  (0 % change)    Instructed patient to follow up no later than: one month    Parrish verbalizes understanding and agrees to warfarin dosing plan.    Instructed to call the [facility] for any changes, questions or concerns. (#[phone])   ?   Dakota Dowling RN    Subjective/Objective:      Jurgen Kirkland, a 58 y.o. male is on warfarin.     Jurgen reports:     Home warfarin dose: verbally confirmed home dose with Parrish and updated on anticoagulation calendar     Missed doses: No     Medication changes:  No     S/S of bleeding or thromboembolism:  No     New Injury or illness:  No     Changes in diet or alcohol consumption:  No     Upcoming surgery, procedure or cardioversion:  No    Anticoagulation Episode Summary     Current INR goal:   2.0-3.0   TTR:   43.8 % (4.3 mo)   Next INR check:   2/25/2019   INR from last check:   2.60 (1/28/2019)   Weekly max warfarin dose:      Target end date:   Indefinite   INR check location:      Preferred lab:      Send INR reminders to:   ANTICOAGULATION POOL A (WBY,WBE,MID,RSC)    Indications    Apical mural thrombus [I51.3]  CAD  multiple vessel [I25.10]           Comments:            Anticoagulation Care Providers     Provider Role Specialty Phone number    Jasson Ponce MD Referring Internal Medicine [phone]

## 2021-06-24 ENCOUNTER — COMMUNICATION - HEALTHEAST (OUTPATIENT)
Dept: INTERNAL MEDICINE | Facility: CLINIC | Age: 61
End: 2021-06-24

## 2021-06-24 NOTE — TELEPHONE ENCOUNTER
ANTICOAGULATION  MANAGEMENT    Assessment     Today's INR result of 2.1 is Therapeutic (goal INR of 2.0-3.0)        Warfarin taken as previously instructed    No new diet changes affecting INR    No new medication/supplements affecting INR    Continues to tolerate warfarin with no reported s/s of bleeding or thromboembolism     Previous INR was Therapeutic    Plan:     Spoke with Jurgen regarding INR result and instructed:     Warfarin Dosing Instructions:  Continue current warfarin dose 5 mg daily on mon/wed/fri; and 7.5 mg daily rest of week  (0 % change)    Instructed patient to follow up no later than: one month      Parrish verbalizes understanding and agrees to warfarin dosing plan.    Instructed to call the [facility] for any changes, questions or concerns. (#[phone])   ?   Dakota Dowling RN    Subjective/Objective:      Jurgen Kirkland, a 58 y.o. male is on warfarin.     Jurgen reports:     Home warfarin dose: as updated on anticoagulation calendar per template     Missed doses: No     Medication changes:  No     S/S of bleeding or thromboembolism:  No     New Injury or illness:  No     Changes in diet or alcohol consumption:  No     Upcoming surgery, procedure or cardioversion:  No    Anticoagulation Episode Summary     Current INR goal:   2.0-3.0   TTR:   53.7 % (5.3 mo)   Next INR check:   3/25/2019   INR from last check:   2.10 (2/25/2019)   Weekly max warfarin dose:      Target end date:   Indefinite   INR check location:      Preferred lab:      Send INR reminders to:   ANTICOAGULATION POOL A (WBY,WBE,MID,RSC)    Indications    Apical mural thrombus [I51.3]  CAD  multiple vessel [I25.10]           Comments:            Anticoagulation Care Providers     Provider Role Specialty Phone number    Jasson Ponce MD Referring Internal Medicine [phone]

## 2021-06-24 NOTE — TELEPHONE ENCOUNTER
Medication Question or Clarification  Who is calling: Patient  What medication are you calling about?:   atorvastatin (LIPITOR) 40 MG tablet 90 tablet 3 9/24/2018     Sig - Route: Take 1 tablet (40 mg total) by mouth daily. - Oral    Sent to pharmacy as: atorvastatin (LIPITOR) 40 MG tablet      furosemide (LASIX) 20 MG tablet 90 tablet 3 3/1/2019     Sig - Route: Take 1 tablet (20 mg total) by mouth daily. - Oral    Sent to pharmacy as: furosemide (LASIX) 20 MG tablet      simvastatin (ZOCOR) 40 MG tablet 90 tablet 3 3/1/2019     Sig - Route: Take 1 tablet (40 mg total) by mouth daily. - Oral    Sent to pharmacy as: simvastatin (ZOCOR) 40 MG tablet            What dose do you take?: see above   How often are you taking the medication?: see above   Who prescribed the medication?: Jasson Ponce MD   What is your question/concern?: I am requesting the above medications to be removed from my medication list as I am no longer taking these and received these in the mail by the mail order pharmacy.     Pharmacy: [facility]  Okay to leave a detailed message?: Yes  Site CMT - Please call the pharmacy to obtain any additional needed information.

## 2021-06-24 NOTE — PROGRESS NOTES
Jurgen PARIS Kirkland  1960      Assessment and Plan:  1. CAD/hx CABG @ [facility] 9/2018  2 seizure disorder stable on current meds; much improved with removal of work stress  3. HTN stable  4 CKD 3 stable  5 routine labs today;  F/u 4 months with Dr Abdul  6 chronic warfarin anticoagulation indefinite      Chief Complaint: f/u heart meds multiple    Visit diagnoses:    1. HTN (hypertension)  atenolol (TENORMIN) 50 MG tablet    furosemide (LASIX) 20 MG tablet   2. Hyperlipidemia  simvastatin (ZOCOR) 40 MG tablet    LDL Cholesterol, Direct    Comprehensive Metabolic Panel    HM2(CBC w/o Differential)   3. Nonintractable epilepsy without status epilepticus, unspecified epilepsy type (H)     4. Warfarin anticoagulation     5. 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; 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; burrholes-seizures     Past Surgical History:   Procedure Laterality Date     COLONOSCOPY N/A 2010    normal-q 10 yrs     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   Social Needs     Financial resource strain: Not on file     Food  insecurity:     Worry: Not on file     Inability: Not on file     Transportation needs:     Medical: Not on file     Non-medical: Not on file   Tobacco Use     Smoking status: Never Smoker     Smokeless tobacco: Never Used   Substance and Sexual Activity     Alcohol use: No     Drug use: Not on file     Sexual activity: Not on file   Lifestyle     Physical activity:     Days per week: Not on file     Minutes per session: Not on file     Stress: Not on file   Relationships     Social connections:     Talks on phone: Not on file     Gets together: Not on file     Attends Church service: Not on file     Active member of club or organization: Not on file     Attends meetings of clubs or organizations: Not on file     Relationship status: Not on file     Intimate partner violence:     Fear of current or ex partner: Not on file     Emotionally abused: Not on file     Physically abused: Not on file     Forced sexual activity: Not on file   Other Topics Concern     Not on file   Social History Narrative    Single has had girlfriend (RN)-Renée past several years. TBI as infant-fell down steps. Cognitive problems and seizures but finished high school. Refractory seizure disorder goes to [facility]. Recent better control post residential/disability. Lives with brother (Gary)-primary contact, remote etoh problems; non smoker. No ETOH now. 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       atenolol (TENORMIN) 50 MG tablet Take 1 tablet (50 mg total) by mouth daily. 90 tablet 3     atorvastatin (LIPITOR) 40 MG tablet Take 1 tablet (40 mg total) by mouth daily. 90 tablet 3     cholecalciferol, vitamin D3, 1,000 unit tablet Take 1,000 Units by mouth daily.        "furosemide (LASIX) 20 MG tablet Take 1 tablet (20 mg total) by mouth daily. 90 tablet 3     lamoTRIgine (LAMICTAL) 200 MG tablet tAKE ONE TABLET IN THE MORNING, 1/2 TAB AT LUNCH AND 1 TABLET AT HS       levETIRAcetam (KEPPRA XR) 500 mg 24 hr tablet Take 500 mg by mouth. Take three tablets TID       metoprolol succinate (TOPROL XL) 100 MG 24 hr tablet Take 1 tablet (100 mg total) by mouth daily. 30 tablet 11     oxyCODONE (ROXICODONE) 5 MG immediate release tablet Take 1 tablet (5 mg total) by mouth every 8 (eight) hours as needed for pain. 20 tablet 0     SENNA-S 8.6-50 mg tablet        simvastatin (ZOCOR) 40 MG tablet Take 1 tablet (40 mg total) by mouth daily. 90 tablet 3     warfarin (COUMADIN/JANTOVEN) 5 MG tablet TAKE 1 TABLET EVERY DAY  OR AS DIRECTED  FOR BLOOD THINNER. 90 tablet 1     No current facility-administered medications for this visit.        Allergies   Allergen Reactions     Nsaids (Non-Steroidal Anti-Inflammatory Drug) Nephrotoxicity       ROS: complete review of symptoms otherwise negative except as noted below    S:  Doing great best ever has significant other \"nurse Chinchilla\" who has helped Jurgen tremendously seizures under control and cardiac status and risk factors controlled       O:   Vitals:    03/01/19 1020   BP: 114/70   Patient Site: Left Arm   Patient Position: Sitting   Cuff Size: Adult Regular   Pulse: (!) 56   SpO2: 97%   Weight: 212 lb (96.2 kg)       Physical Exam:  General-  VS- see above  HEENT- neg   Neck- no adenopathy/thyromegaly/bruits  Chest- clear   Cor- reg no murmurs/gallops/ectopics  Extremities: no edema, good distal pulses  Neuro- Cr. NN-  intact, alert, somewhat slow speech and mentation as noted before    Recent Results (from the past 240 hour(s))   INR   Result Value Ref Range    INR 2.10 (H) 0.90 - 1.10   LDL Cholesterol, Direct   Result Value Ref Range    Direct  <=129 mg/dl   Comprehensive Metabolic Panel   Result Value Ref Range    Sodium 139 136 - 145 " mmol/L    Potassium 4.6 3.5 - 5.0 mmol/L    Chloride 105 98 - 107 mmol/L    CO2 28 22 - 31 mmol/L    Anion Gap, Calculation 6 5 - 18 mmol/L    Glucose 55 (LL) 70 - 125 mg/dL    BUN 26 (H) 8 - 22 mg/dL    Creatinine 1.80 (H) 0.70 - 1.30 mg/dL    GFR MDRD Af Amer 47 (L) >60 mL/min/1.73m2    GFR MDRD Non Af Amer 39 (L) >60 mL/min/1.73m2    Bilirubin, Total 0.5 0.0 - 1.0 mg/dL    Calcium 9.8 8.5 - 10.5 mg/dL    Protein, Total 6.6 6.0 - 8.0 g/dL    Albumin 3.9 3.5 - 5.0 g/dL    Alkaline Phosphatase 103 45 - 120 U/L    AST 32 0 - 40 U/L    ALT 41 0 - 45 U/L   HM2(CBC w/o Differential)   Result Value Ref Range    WBC 5.6 4.0 - 11.0 thou/uL    RBC 5.15 4.40 - 6.20 mill/uL    Hemoglobin 16.5 14.0 - 18.0 g/dL    Hematocrit 49.2 40.0 - 54.0 %    MCV 96 80 - 100 fL    MCH 32.1 27.0 - 34.0 pg    MCHC 33.6 32.0 - 36.0 g/dL    RDW 12.0 11.0 - 14.5 %    Platelets 198 140 - 440 thou/uL    MPV 7.0 7.0 - 10.0 fL       -          Jasson Ponce MD

## 2021-06-26 NOTE — TELEPHONE ENCOUNTER
ANTICOAGULATION  MANAGEMENT    Assessment     Today's INR result of 2.9 is Therapeutic (goal INR of 2.0-3.0)        Warfarin taken as previously instructed    No new diet changes affecting INR    No new medication/supplements affecting INR    Continues to tolerate warfarin with no reported s/s of bleeding or thromboembolism     Previous INR was Subtherapeutic    Plan:     Spoke on phone with Jurgen regarding INR result and instructed:      Warfarin Dosing Instructions:  Continue current warfarin dose 5 mg daily on sun/tue/thu; and 7.5 mg daily rest of week  (0 % change)    Instructed patient to follow up no later than: two weeks      Parrish verbalizes understanding and agrees to warfarin dosing plan.    Instructed to call the [facility] for any changes, questions or concerns. (#[phone])   ?   Dakota Dowling RN    Subjective/Objective:      Jurgen Kirkland, a 60 y.o. male is on warfarin. Jurgen Seniorlas reports:     Home warfarin dose: verbally confirmed home dose with Parrish and updated on anticoagulation calendar     Missed doses: No     Medication changes:  No     S/S of bleeding or thromboembolism:  No     New Injury or illness:  No     Changes in diet or alcohol consumption:  No     Upcoming surgery, procedure or cardioversion:  No    Anticoagulation Episode Summary     Current INR goal:  2.0-3.0   TTR:  79.6 % (1 y)   Next INR check:  7/1/2021   INR from last check:  2.90 (6/17/2021)   Weekly max warfarin dose:     Target end date:  Indefinite   INR check location:     Preferred lab:     Send INR reminders to:  [facility]    Indications    Apical mural thrombus [I51.3]  CAD  multiple vessel [I25.10]           Comments:           Anticoagulation Care Providers     Provider Role Specialty Phone number    Jasson Abdul MD Referring Internal Medicine [phone]

## 2021-06-26 NOTE — TELEPHONE ENCOUNTER
rx has been sent to [facility] 6/21, talked with Parrish who will let me know if it doesn't come soon

## 2021-06-26 NOTE — TELEPHONE ENCOUNTER
RN cannot approve Refill Request    RN can NOT refill this medication med is not covered by policy/route to provider. Last office visit: 3/10/2021 Jasson Abdul MD Last Physical: 9/12/2019 Last MTM visit: Visit date not found Last visit same specialty: 3/10/2021 Jasson Abdul MD.  Next visit within 3 mo: Visit date not found  Next physical within 3 mo: Visit date not found      Gisel Cancino, [facility] Triage/Med Refill 6/18/2021    Requested Prescriptions   Pending Prescriptions Disp Refills     warfarin ANTICOAGULANT (COUMADIN/JANTOVEN) 5 MG tablet [Pharmacy Med Name: WARFARIN SODIUM 5 MG Tablet] 135 tablet 0     Sig: TAKE 1 TABLET (5MG) TO 1 AND 1/2 TABLETS (7.5MG) DAILY, AS DIRECTED.  ADJUST DOSE BASED ON INR RESULTS.       Warfarin Refill Protocol  Failed - 6/18/2021  7:25 PM        Failed -  Route to appropriate pool/provider     Last Anticoagulation Summary:   Anticoagulation Episode Summary     Current INR goal:  2.0-3.0   TTR:  79.6 % (1 y)   Next INR check:  7/1/2021   INR from last check:  2.90 (6/17/2021)   Weekly max warfarin dose:     Target end date:  Indefinite   INR check location:     Preferred lab:     Send INR reminders to:  [facility]    Indications    Apical mural thrombus [I51.3]  CAD  multiple vessel [I25.10]           Comments:           Anticoagulation Care Providers     Provider Role Specialty Phone number    Jasson Abdul MD Referring Internal Medicine [phone]                Passed - Provider visit in last year     Last office visit with prescriber/PCP: 3/10/2021 Jasson Abdul MD OR same dept: 3/10/2021 Jasson Abdul MD OR same specialty: 3/10/2021 Jasson Abdul MD  Last physical: 9/12/2019 Last MTM visit: Visit date not found    Next appt within 3 mo: Visit date not found Next physical within 3 mo: Visit date not found  Prescriber OR PCP: Jasson Abdul MD  Last diagnosis associated with med order: 1. Acute deep vein thrombosis of left femoral vein  (H)  - warfarin ANTICOAGULANT (COUMADIN/JANTOVEN) 5 MG tablet [Pharmacy Med Name: WARFARIN SODIUM 5 MG Tablet]; TAKE 1 TABLET (5MG) TO 1 AND 1/2 TABLETS (7.5MG) DAILY, AS DIRECTED.  ADJUST DOSE BASED ON INR RESULTS.  Dispense: 135 tablet; Refill: 0    2. Apical mural thrombus  - warfarin ANTICOAGULANT (COUMADIN/JANTOVEN) 5 MG tablet [Pharmacy Med Name: WARFARIN SODIUM 5 MG Tablet]; TAKE 1 TABLET (5MG) TO 1 AND 1/2 TABLETS (7.5MG) DAILY, AS DIRECTED.  ADJUST DOSE BASED ON INR RESULTS.  Dispense: 135 tablet; Refill: 0    3. Long term (current) use of anticoagulants  - warfarin ANTICOAGULANT (COUMADIN/JANTOVEN) 5 MG tablet [Pharmacy Med Name: WARFARIN SODIUM 5 MG Tablet]; TAKE 1 TABLET (5MG) TO 1 AND 1/2 TABLETS (7.5MG) DAILY, AS DIRECTED.  ADJUST DOSE BASED ON INR RESULTS.  Dispense: 135 tablet; Refill: 0    If protocol passes may refill for 6 months if within 3 months of last provider visit (or a total of 9 months).

## 2021-06-26 NOTE — TELEPHONE ENCOUNTER
FYI - Status Update  Who is Calling: Patient  Update: Patient received a call from [facility] stating that they still have not heard back regarding the patients warfarin prescription. Please call the patient back to discuss this further, thank you.  Okay to leave a detailed message?:  Yes

## 2021-06-28 NOTE — PROGRESS NOTES
Progress Notes by Norma Slade PA-C at 1/14/2020  3:20 PM     Author: Norma Slade PA-C Service: -- Author Type: Physician Assistant    Filed: 1/14/2020  7:37 PM Encounter Date: 1/14/2020 Status: Signed    : Norma Slade PA-C (Physician Assistant)       Chief Complaint   Patient presents with   ? Foot Pain     Left foot pain, slipped on ice and fell today. Pt has been using crutch from home that is relieving some pain. Pain is on the left lateral central dorsal foot.       HPI:  Jurgen Kirkland is a 59 y.o. male who presents today complaining of left foot pain after slipping on the ice earlier today.  Pain is on the top of his left foot at the base of his third and fourth digit.  It is more painful when he puts pressure on the foot.  He has been using a crutch to ambulate.  He has not taken any medications for his symptoms.  He does not have any past medical history of previous fracture in the foot.    History obtained from the patient.    Problem List:  2019-12: Positive colorectal cancer screening using Cologuard test  2019-10: Warfarin anticoagulation  2019-10: Acute deep vein thrombosis of left femoral vein (H)  2019-10: Acute deep vein thrombosis (DVT) of femoral vein of left   lower extremity (H)  2019-08: Brain compression (H)  2019-08: Midline shift of brain  2019-08: Subdural hematoma (H)  2019-08: SDH (subdural hematoma) (H)  2019-07: Sebaceous cyst  2018-09: S/P CABG (coronary artery bypass graft)  2018-09: Warfarin anticoagulation  2018-09: CAD, multiple vessel  2018-09: Apical mural thrombus  2016-10: Abrasion of scrotum  Alcohol Abuse  Head Injury - With Subdural Hemorrhage  Cognitive Functions Current Level Impaired  Noncompliance With Therapy  Epilepsy And Recurrent Seizures  Primary hypertension  CKD (chronic kidney disease) stage 3, GFR 30-59 ml/min (H)  Ankle Sprain  Renal Insufficiency  Hyperlipemia  Traumatic Brain Injury  Left heart thrombus      Past Medical History:    Diagnosis Date   ? Alcohol abuse 1990    Remote   ? Chronic kidney disease    ? CKD (chronic kidney disease) stage 3, GFR 30-59 ml/min (H)    ? Compression of brain (H)    ? Coronary artery disease    ? Epilepsy (H)     with recurrent seizures   ? Family history of alcohol abuse    ? Headache    ? Hyperlipidemia    ? Hypertension    ? Ischemic cardiomyopathy    ? Midline shift of brain    ? Mural thrombus of heart    ? Myocardial infarction (H)    ? Positive colorectal cancer screening using Cologuard test 12/3/2019   ? Sebaceous cyst    ? Seizures (H)     [place]   ? Subacute subdural hematoma (H)    ? 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 Warfarin anticoagulation        Social History     Tobacco Use   ? Smoking status: Never Smoker   ? Smokeless tobacco: Never Used   Substance Use Topics   ? Alcohol use: No       Review of Systems   Musculoskeletal: Positive for arthralgias and gait problem. Negative for joint swelling.   Skin: Negative for color change and wound.   All other systems reviewed and are negative.      Vitals:    01/14/20 1526   BP: 158/79   Pulse: 73   Resp: 24   Temp: 98.5  F (36.9  C)   TempSrc: Oral   SpO2: 95%   Weight: 212 lb 8 oz (96.4 kg)       Physical Exam  Vitals signs and nursing note reviewed.   Constitutional:       General: He is not in acute distress.     Appearance: He is well-developed. He is not diaphoretic.   HENT:      Head: Normocephalic and atraumatic.      Right Ear: External ear normal.      Left Ear: External ear normal.   Eyes:      General:         Right eye: No discharge.         Left eye: No discharge.      Conjunctiva/sclera: Conjunctivae normal.   Pulmonary:      Effort: Pulmonary effort is normal. No respiratory distress.   Musculoskeletal:        Feet:    Neurological:      Mental Status: He is alert.   Psychiatric:         Behavior: Behavior normal.         Thought Content: Thought content normal.          Judgment: Judgment normal.           Radiology:  I have personally ordered and preliminarily reviewed the following xray, I have noted no signs of fracture  Xr Foot Left 3 Or More Vws    Result Date: 1/14/2020  EXAM DATE:         01/14/2020 EXAM: X-RAY FOOT LEFT, MINIMUM 3 VIEWS LOCATION: [facility] DATE/TIME: 1/14/2020 4:00 PM INDICATION: Slipped and fell. Pain over the 3-4 left metatarsal bones and proximal phylanges. COMPARISON: None. IMPRESSION: The left foot is negative for fracture. Minimal Achilles calcaneal spurring. Mild hammertoe deformities. Vascular calcifications. No erosive changes are identified.       Clinical Decision Making:  Foot x-ray was negative for any signs of fracture.  Suspect left foot sprain.  Recommend rice therapy.  At the end of the encounter, I discussed results, diagnosis, medications. Discussed red flags for immediate return to clinic/ER, as well as indications for follow up if no improvement. Patient understood and agreed to plan. Patient was stable for discharge.    1. Foot injury, left, initial encounter  XR Foot Left 3 or More VWS         Patient Instructions   1) Ibuprofen 600 mg three times daily with food for 10 days, then as needed.  2) Ice to the affected area 20 minutes three times daily.  3) Rest the foot, may continue with crutches.  4) Follow up in 10-14 days if not improving, sooner if worsening.

## 2021-07-01 ENCOUNTER — AMBULATORY - HEALTHEAST (OUTPATIENT)
Dept: LAB | Facility: CLINIC | Age: 61
End: 2021-07-01

## 2021-07-01 ENCOUNTER — COMMUNICATION - HEALTHEAST (OUTPATIENT)
Dept: ANTICOAGULATION | Facility: CLINIC | Age: 61
End: 2021-07-01

## 2021-07-01 DIAGNOSIS — I51.3 APICAL MURAL THROMBUS: ICD-10-CM

## 2021-07-01 DIAGNOSIS — I82.412 ACUTE DEEP VEIN THROMBOSIS OF LEFT FEMORAL VEIN (H): ICD-10-CM

## 2021-07-01 DIAGNOSIS — I25.10 CAD, MULTIPLE VESSEL: ICD-10-CM

## 2021-07-01 LAB — INR PPP: 2.9 (ref 0.9–1.1)

## 2021-07-03 NOTE — ADDENDUM NOTE
Addendum Note by Tim Talamantes MD at 7/19/2019  1:00 PM     Author: Tim Talamantes MD Service: -- Author Type: Physician    Filed: 7/22/2019  3:05 PM Encounter Date: 7/19/2019 Status: Signed    : Tim Talamantes MD (Physician)    Addended by: TIM TALAMANTES on: 7/22/2019 03:05 PM        Modules accepted: Orders

## 2021-07-03 NOTE — ADDENDUM NOTE
Addendum Note by Quan Ponce MD at 6/21/2018  9:04 AM     Author: Quan Ponce MD Service: -- Author Type: Physician    Filed: 6/21/2018  9:04 AM Encounter Date: 6/19/2018 Status: Signed    : Quan Ponce MD (Physician)    Addended by: QUAN PONCE on: 6/21/2018 09:04 AM        Modules accepted: Orders

## 2021-07-03 NOTE — ADDENDUM NOTE
Addendum Note by Dakota Dowling RN at 9/10/2018  6:03 PM     Author: Dakota Dowling RN Service: -- Author Type: Registered Nurse    Filed: 9/10/2018  6:03 PM Encounter Date: 9/10/2018 Status: Signed    : Dakota Dowling RN (Registered Nurse)    Addended by: DAKOTA DOWLING on: 9/10/2018 06:03 PM        Modules accepted: Orders

## 2021-07-03 NOTE — ADDENDUM NOTE
Addendum Note by Dakota Dowling RN at 9/11/2018  9:48 AM     Author: Dakota Dowling RN Service: -- Author Type: Registered Nurse    Filed: 9/11/2018  9:48 AM Encounter Date: 9/10/2018 Status: Signed    : Dakota Dowling RN (Registered Nurse)    Addended by: DAKOTA DOWLING on: 9/11/2018 09:48 AM        Modules accepted: Orders

## 2021-07-04 NOTE — TELEPHONE ENCOUNTER
After Visit Summary   8/3/2017    Denny Herrera    MRN: [id number]           Patient Information     Date Of Birth          1980        Visit Information        Provider Department      8/3/2017 7:50 AM Jose Durand, PT [facility]        Today's Diagnoses     Acute pain of right knee        Status post surgery           Follow-ups after your visit        Your next 10 appointments already scheduled     Aug 08, 2017  7:50 AM CDT   RENETTA Extremity with Jose Durand, PT   [facility] ([facility])    [address]  [facility] [address]   [phone]            Aug 10, 2017  8:10 AM CDT   RENETTA Extremity with Gabbi Lane PT   [facility] ([facility])    [address]  [facility] [address]   [phone]            Aug 15, 2017  7:50 AM CDT   RENETTA Extremity with Jose Durand, PT   [facility] ([facility])    [address]  [facility] [address]   [phone]            Aug 18, 2017  8:20 AM CDT   RENETTA Extremity with Gerber Amador PT   [facility] ([facility])    [address]  [facility] [id number] [phone]              Who to contact     If you have questions or need follow up information about today's clinic visit or your schedule please contact [facility]ERAP[facility] directly at [phone].  Normal or non-critical lab and imaging results will be communicated to you by MyChart, letter or phone within 4 business days after the clinic has received the results. If you do not hear from us within 7 days, please contact the clinic through MyChart or phone. If you have a critical or abnormal lab result, we  Telephone Encounter by Dakota Dowling RN at 6/3/2021 11:17 AM     Author: Dakota Dowling RN Service: -- Author Type: Registered Nurse    Filed: 6/3/2021  2:43 PM Encounter Date: 6/3/2021 Status: Signed    : Dakota Dowling RN (Registered Nurse)       ANTICOAGULATION  MANAGEMENT    Assessment     Today's INR result of 1.8 is Subtherapeutic (goal INR of 2.0-3.0)        Warfarin taken as previously instructed    No new diet changes affecting INR    No new medication/supplements affecting INR    Continues to tolerate warfarin with no reported s/s of bleeding or thromboembolism     Previous INR was Therapeutic    Plan:     Spoke on phone with Jurgen regarding INR result and instructed:      Warfarin Dosing Instructions:  Change warfarin dose to 7.5 mg daily on mon/wed/fri/sat; and 5 mg daily rest of week  (9 % change)    Instructed patient to follow up no later than: two weeks      Parrish verbalizes understanding and agrees to warfarin dosing plan.    Instructed to call the [facility] for any changes, questions or concerns. (#[phone])   ?   Dakota Dowling RN    Subjective/Objective:      Jurgen Kirkland, a 60 y.o. male is on warfarin. Jurgen Seaman reports:     Home warfarin dose: verbally confirmed home dose with Parrish and updated on anticoagulation calendar     Missed doses: No     Medication changes:  No     S/S of bleeding or thromboembolism:  No     New Injury or illness:  No     Changes in diet or alcohol consumption:  No     Upcoming surgery, procedure or cardioversion:  No    Anticoagulation Episode Summary     Current INR goal:  2.0-3.0   TTR:  80.3 % (1 y)   Next INR check:  6/17/2021   INR from last check:  1.80 (6/3/2021)   Weekly max warfarin dose:     Target end date:  Indefinite   INR check location:     Preferred lab:     Send INR reminders to:  [facility]    Indications    Apical mural thrombus [I51.3]  CAD  multiple vessel [I25.10]           Comments:           Anticoagulation  Care Providers     Provider Role Specialty Phone number    Jasson Abdul MD Referring Internal Medicine [phone]              will notify you by phone as soon as possible.  Submit refill requests through [employer] or call your pharmacy and they will forward the refill request to us. Please allow 3 business days for your refill to be completed.          Additional Information About Your Visit        [employer] Information     [employer] gives you secure access to your electronic health record. If you see a primary care provider, you can also send messages to your care team and make appointments. If you have questions, please call your primary care clinic.  If you do not have a primary care provider, please call [phone] and they will assist you.        Care EveryWhere ID     This is your Care EveryWhere ID. This could be used by other organizations to access your [place] medical records  [id number]         Blood Pressure from Last 3 Encounters:   07/27/17 132/80   02/20/17 108/80   11/07/16 143/88    Weight from Last 3 Encounters:   07/27/17 121.1 kg (267 lb)   05/08/17 117.9 kg (260 lb)   02/20/17 117.9 kg (260 lb)              We Performed the Following     HC PT EVAL, LOW COMPLEXITY     RENETTA INITIAL EVAL REPORT     THERAPEUTIC EXERCISES        Primary Care Provider Office Phone # Fax #    Gali Del Valle [phone]       [facility] [address] [place] [address]        Equal Access to Services     VENKAT HARTMAN : Hadii aad ku hadasho Soomaali, waaxda luqadaha, qaybta kaalmada adeegyada, hardeep odomin hayaan kristian mcneal . So [facility] [phone].    ATENCIÓN: Si habla español, tiene a tapia disposición servicios gratuitos de asistencia lingüística. Llame al [phone].    We comply with applicable federal civil rights laws and [place] laws. We do not discriminate on the basis of race, color, national origin, age, disability sex, sexual orientation or gender identity.            Thank you!     Thank you for choosing [facility]  for  your care. Our goal is always to provide you with excellent care. Hearing back from our patients is one way we can continue to improve our services. Please take a few minutes to complete the written survey that you may receive in the mail after your visit with us. Thank you!             Your Updated Medication List - Protect others around you: Learn how to safely use, store and throw away your medicines at www.disposemymeds.org.          This list is accurate as of: 8/3/17  8:28 AM.  Always use your most recent med list.                   Brand Name Dispense Instructions for use Diagnosis    allopurinol 300 MG tablet    ZYLOPRIM    135 tablet    Take 1 tablet (300 mg) by mouth daily And 1/2 tablet (150 mg) by mouth in evening    Podagra       fenofibrate 160 MG tablet     90 tablet    Take 1 tablet (160 mg) by mouth daily    Hypertriglyceridemia       MIRALAX powder   Generic drug:  polyethylene glycol     510 g    Take 17 g (1 capful) by mouth daily    Status post osteotomy       Multi-vitamin Tabs tablet      Take 1 tablet by mouth daily        OMEGA-3 FISH OIL PO      Take 1 capsule by mouth daily        VITAMIN D (CHOLECALCIFEROL) PO      Take by mouth daily

## 2021-07-04 NOTE — TELEPHONE ENCOUNTER
Telephone Encounter by Dakota Dowling RN at 7/1/2021  4:27 PM     Author: Dakota Dowling RN Service: -- Author Type: Registered Nurse    Filed: 7/1/2021  4:34 PM Encounter Date: 7/1/2021 Status: Signed    : Dakota Dowling RN (Registered Nurse)       ANTICOAGULATION MANAGEMENT     Jurgen Kirkland 61 y.o., male is on warfarin with Therapeutic INR result (goal range 2.0-3.0)    Recent labs: (last 7 days)     07/01/21  1316   INR 2.90*       ASSESSMENT     Source: Template      Warfarin dosing taken: Warfarin taken as instructed    Diet: No new diet changes affecting INR    Illness, Injury or hospitalization: No    Medication changes: None    Signs or symptoms of bleeding or clotting: No    Previous INR: therapeutic last 2(+) visits    Additional findings: None     PLAN     Recommended plan for no diet, medication or health factor changes affecting INR:     Dosing instructions: Continue your current warfarin dose 5 mg daily on sun/tue/thu; and 7.5 mg daily rest of week (0% change)    Follow up no later than: 4 weeks     Detailed voice message left for Jurgen with dosing instructions and follow up date.     Left detailed message with recommended recheck date      Plan made per [facility] anticoagulation protocol    Dakota Dowling  Anticoagulation Clinic   [phone]    Anticoagulation Episode Summary     Current INR goal:  2.0-3.0   TTR:  79.6 % (1 y)   Next INR check:  7/29/2021   INR from last check:  2.90 (7/1/2021)   Weekly max warfarin dose:     Target end date:  Indefinite   INR check location:     Preferred lab:     Send INR reminders to:  [facility]    Indications    Apical mural thrombus [I51.3]  CAD  multiple vessel [I25.10]           Comments:           Anticoagulation Care Providers     Provider Role Specialty Phone number    Jasson Abdul MD Referring Internal Medicine [phone]

## 2021-07-14 PROBLEM — S06.5XAA SUBDURAL HEMATOMA (H): Status: RESOLVED | Noted: 2019-08-05 | Resolved: 2019-10-10

## 2021-07-22 ENCOUNTER — ANTICOAGULATION THERAPY VISIT (OUTPATIENT)
Dept: ANTICOAGULATION | Facility: CLINIC | Age: 61
End: 2021-07-22

## 2021-07-22 ENCOUNTER — LAB (OUTPATIENT)
Dept: LAB | Facility: CLINIC | Age: 61
End: 2021-07-22
Payer: COMMERCIAL

## 2021-07-22 DIAGNOSIS — I82.412 ACUTE DEEP VEIN THROMBOSIS OF LEFT FEMORAL VEIN (H): ICD-10-CM

## 2021-07-22 DIAGNOSIS — I25.10 CAD, MULTIPLE VESSEL: Primary | ICD-10-CM

## 2021-07-22 DIAGNOSIS — I51.3 APICAL MURAL THROMBUS: ICD-10-CM

## 2021-07-22 DIAGNOSIS — I51.3 APICAL MURAL THROMBUS: Primary | ICD-10-CM

## 2021-07-22 LAB — INR BLD: 4 (ref 0.9–1.1)

## 2021-07-22 PROCEDURE — 85610 PROTHROMBIN TIME: CPT | Performed by: INTERNAL MEDICINE

## 2021-07-22 NOTE — PROGRESS NOTES
ANTICOAGULATION MANAGEMENT     Jurgen Kirkland 61 year old male is on warfarin with supratherapeutic INR result. (Goal INR 2.0-3.0)    Recent labs: (last 7 days)     07/22/21  1357   INR 4.0*       ASSESSMENT     Source(s): Chart Review, Patient/Caregiver Call and Template was incorrect       Warfarin doses taken: More warfarin taken than planned which may be affecting INR    Diet: No new diet changes identified    New illness, injury, or hospitalization: No    Medication/supplement changes: None noted    Signs or symptoms of bleeding or clotting: No    Previous INR: Therapeutic last 2(+) visits    Additional findings: None     PLAN     Recommended plan for temporary change(s) affecting INR     Dosing Instructions: Hold dose then continue your current warfarin dose with next INR in 1 week.  Patient has already taken warfarin for today, advised to hold dose tomorrow.       Summary  As of 7/22/2021    Full warfarin instructions:  7/22: Hold; 7/23: 5 mg; Otherwise 5 mg every Sun, Tue, Thu; 7.5 mg all other days   Next INR check:  7/29/2021             Telephone call with Jurgen who agrees to plan and repeated back plan correctly  Detailed voice message left for  Gary (brother) with dosing instructions and follow up date.     Lab visit scheduled    Education provided: Target INR goal and significance of current INR result, Importance of therapeutic range, Importance of following up for INR monitoring at instructed interval, Importance of taking warfarin as instructed, Monitoring for bleeding signs and symptoms and When to seek medical attention/emergency care    Plan made per [facility] anticoagulation protocol    Silvia Beach, RN  Anticoagulation Clinic  7/22/2021    _______________________________________________________________________     Anticoagulation Episode Summary     Current INR goal:  2.0-3.0   TTR:  74.4 % (1 y)   Target end date:  Indefinite   Send INR reminders to:  [facility]    Indications     CAD  multiple vessel [I25.10]  Apical mural thrombus [I51.3]           Comments:           Anticoagulation Care Providers     Provider Role Specialty Phone number    Jasson Abdul MD Referring Internal Medicine [phone]

## 2021-07-29 ENCOUNTER — LAB (OUTPATIENT)
Dept: LAB | Facility: CLINIC | Age: 61
End: 2021-07-29
Payer: COMMERCIAL

## 2021-07-29 ENCOUNTER — ANTICOAGULATION THERAPY VISIT (OUTPATIENT)
Dept: ANTICOAGULATION | Facility: CLINIC | Age: 61
End: 2021-07-29

## 2021-07-29 DIAGNOSIS — I25.10 CAD, MULTIPLE VESSEL: Primary | ICD-10-CM

## 2021-07-29 DIAGNOSIS — I51.3 APICAL MURAL THROMBUS: ICD-10-CM

## 2021-07-29 DIAGNOSIS — I82.412 ACUTE DEEP VEIN THROMBOSIS OF LEFT FEMORAL VEIN (H): ICD-10-CM

## 2021-07-29 LAB — INR BLD: 2.9 (ref 0.9–1.1)

## 2021-07-29 PROCEDURE — 85610 PROTHROMBIN TIME: CPT | Performed by: INTERNAL MEDICINE

## 2021-07-29 NOTE — PROGRESS NOTES
ANTICOAGULATION MANAGEMENT     Jurgen Kirkland 61 year old male is on warfarin with therapeutic INR result. (Goal INR 2.0-3.0)    Recent labs: (last 7 days)     07/29/21  1103   INR 2.9*       ASSESSMENT     Source(s): Chart Review, Patient/Caregiver Call and Template       Warfarin doses taken: Warfarin taken as instructed    Diet: No new diet changes identified    New illness, injury, or hospitalization: No    Medication/supplement changes: None noted    Signs or symptoms of bleeding or clotting: No    Previous INR: Supratherapeutic    Additional findings: None     PLAN     Recommended plan for no diet, medication or health factor changes affecting INR     Dosing Instructions: Continue your current warfarin dose with next INR in 2 weeks       Summary  As of 7/29/2021    Full warfarin instructions:  5 mg every Sun, Tue, Thu; 7.5 mg all other days   Next INR check:  8/12/2021             Telephone call with Jurgen who verbalizes understanding and agrees to plan    Lab visit scheduled    Education provided: None required    Plan made per [facility] anticoagulation protocol    Dakota Dowling RN  Anticoagulation Clinic  7/29/2021    _______________________________________________________________________     Anticoagulation Episode Summary     Current INR goal:  2.0-3.0   TTR:  72.7 % (1 y)   Target end date:  Indefinite   Send INR reminders to:  [facility]    Indications    CAD  multiple vessel [I25.10]  Apical mural thrombus [I51.3]           Comments:           Anticoagulation Care Providers     Provider Role Specialty Phone number    Jasson Abdul MD Referring Internal Medicine [phone]

## 2021-08-03 PROBLEM — I82.412 ACUTE DEEP VEIN THROMBOSIS (DVT) OF FEMORAL VEIN OF LEFT LOWER EXTREMITY (H): Status: RESOLVED | Noted: 2019-10-02 | Resolved: 2019-10-10

## 2021-08-03 PROBLEM — G93.5 BRAIN COMPRESSION (H): Status: RESOLVED | Noted: 2019-08-09 | Resolved: 2019-10-10

## 2021-08-19 ENCOUNTER — TELEPHONE (OUTPATIENT)
Dept: ANTICOAGULATION | Facility: CLINIC | Age: 61
End: 2021-08-19

## 2021-08-19 NOTE — TELEPHONE ENCOUNTER
ANTICOAGULATION     Jurgen Kirkland is overdue for INR check.      Spoke with Parrish and scheduled lab appointment on 8/26    Dakota Dowling RN

## 2021-08-26 ENCOUNTER — LAB (OUTPATIENT)
Dept: LAB | Facility: CLINIC | Age: 61
End: 2021-08-26
Payer: COMMERCIAL

## 2021-08-26 ENCOUNTER — ANTICOAGULATION THERAPY VISIT (OUTPATIENT)
Dept: ANTICOAGULATION | Facility: CLINIC | Age: 61
End: 2021-08-26

## 2021-08-26 DIAGNOSIS — I51.3 APICAL MURAL THROMBUS: ICD-10-CM

## 2021-08-26 DIAGNOSIS — I25.10 CAD, MULTIPLE VESSEL: Primary | ICD-10-CM

## 2021-08-26 DIAGNOSIS — I82.412 ACUTE DEEP VEIN THROMBOSIS OF LEFT FEMORAL VEIN (H): ICD-10-CM

## 2021-08-26 LAB — INR BLD: 2.8 (ref 0.9–1.1)

## 2021-08-26 PROCEDURE — 85610 PROTHROMBIN TIME: CPT

## 2021-08-26 PROCEDURE — 36416 COLLJ CAPILLARY BLOOD SPEC: CPT

## 2021-08-26 NOTE — PROGRESS NOTES
ANTICOAGULATION MANAGEMENT     Jurgen Kirkland 61 year old male is on warfarin with therapeutic INR result. (Goal INR 2.0-3.0)    Recent labs: (last 7 days)     08/26/21  1059   INR 2.8*       ASSESSMENT     Source(s): Chart Review and Patient/Caregiver Call       Warfarin doses taken: Warfarin taken as instructed    Diet: No new diet changes identified    New illness, injury, or hospitalization: No    Medication/supplement changes: None noted    Signs or symptoms of bleeding or clotting: No    Previous INR: Therapeutic last 2(+) visits    Additional findings: None     PLAN     Recommended plan for no diet, medication or health factor changes affecting INR     Dosing Instructions: Continue your current warfarin dose with next INR in 4 weeks       Summary  As of 8/26/2021    Full warfarin instructions:  5 mg every Sun, Tue, Thu; 7.5 mg all other days   Next INR check:  9/23/2021             Telephone call with Jurgen who verbalizes understanding and agrees to plan    Lab visit scheduled    Education provided: Please call back if any changes to your diet, medications or how you've been taking warfarin    Plan made per [facility] anticoagulation protocol    Silvia Beach, RN  Anticoagulation Clinic  8/26/2021    _______________________________________________________________________     Anticoagulation Episode Summary     Current INR goal:  2.0-3.0   TTR:  72.7 % (1 y)   Target end date:  Indefinite   Send INR reminders to:  [facility]    Indications    CAD  multiple vessel [I25.10]  Apical mural thrombus [I51.3]           Comments:           Anticoagulation Care Providers     Provider Role Specialty Phone number    Jasson Abdul MD Referring Internal Medicine [phone]

## 2021-09-30 ENCOUNTER — TELEPHONE (OUTPATIENT)
Dept: ANTICOAGULATION | Facility: CLINIC | Age: 61
End: 2021-09-30

## 2021-09-30 DIAGNOSIS — I51.3 APICAL MURAL THROMBUS: ICD-10-CM

## 2021-09-30 DIAGNOSIS — I25.10 CAD, MULTIPLE VESSEL: Primary | ICD-10-CM

## 2021-11-02 ENCOUNTER — TRANSFERRED RECORDS (OUTPATIENT)
Dept: HEALTH INFORMATION MANAGEMENT | Facility: CLINIC | Age: 61
End: 2021-11-02
Payer: COMMERCIAL

## 2021-11-30 DIAGNOSIS — I82.412 ACUTE DEEP VEIN THROMBOSIS OF LEFT FEMORAL VEIN (H): ICD-10-CM

## 2021-11-30 DIAGNOSIS — Z79.01 LONG TERM (CURRENT) USE OF ANTICOAGULANTS: ICD-10-CM

## 2021-11-30 DIAGNOSIS — I51.3 APICAL MURAL THROMBUS: ICD-10-CM

## 2021-12-01 RX ORDER — WARFARIN SODIUM 5 MG/1
TABLET ORAL
Qty: 135 TABLET | Refills: 0 | Status: SHIPPED | OUTPATIENT
Start: 2021-12-01 | End: 2022-02-14

## 2022-04-05 ENCOUNTER — TRANSFERRED RECORDS (OUTPATIENT)
Dept: HEALTH INFORMATION MANAGEMENT | Facility: CLINIC | Age: 62
End: 2022-04-05
Payer: COMMERCIAL

## 2022-07-07 DIAGNOSIS — I82.412 ACUTE DEEP VEIN THROMBOSIS OF LEFT FEMORAL VEIN (H): ICD-10-CM

## 2022-07-07 DIAGNOSIS — I51.3 APICAL MURAL THROMBUS: ICD-10-CM

## 2022-07-07 DIAGNOSIS — Z79.01 LONG TERM (CURRENT) USE OF ANTICOAGULANTS: ICD-10-CM

## 2022-07-08 NOTE — TELEPHONE ENCOUNTER
"Routing refill request to provider for review/approval because:  inr    Last Written Prescription Date:  2/14/22  Last Fill Quantity: 135,  # refills: 1   Last office visit provider:  3/10/21     Requested Prescriptions   Pending Prescriptions Disp Refills     warfarin ANTICOAGULANT (COUMADIN) 5 MG tablet 135 tablet 1     Sig: [WARFARIN ANTICOAGULANT (COUMADIN/JANTOVEN) 5 MG TABLET] TAKE 1 TABLET (5MG) TO 1 AND 1/2 TABLETS (7.5MG) DAILY, AS DIRECTED.  ADJUST DOSE BASED ON INR RESULTS.       Vitamin K Antagonists Failed - 7/7/2022 10:54 AM        Failed - Recent (12 mo) or future (30 days) visit within the authorizing provider's specialty     Patient has had an office visit with the authorizing provider or a provider within the authorizing providers department within the previous 12 mos or has a future within next 30 days. See \"Patient Info\" tab in inbasket, or \"Choose Columns\" in Meds & Orders section of the refill encounter.              Failed - INR is within goal in the past 6 weeks     Confirm INR is within goal in the past 6 weeks.     Recent Labs   Lab Test 08/26/21  1059   INR 2.8*                       Passed - Medication is active on med list        Passed - Patient is 18 years of age or older             Gabrielle Arias RN 07/08/22 12:45 PM  "

## 2022-07-09 RX ORDER — WARFARIN SODIUM 5 MG/1
TABLET ORAL
Qty: 135 TABLET | Refills: 1 | Status: SHIPPED | OUTPATIENT
Start: 2022-07-09

## 2022-12-02 DIAGNOSIS — Z79.01 LONG TERM (CURRENT) USE OF ANTICOAGULANTS: ICD-10-CM

## 2022-12-02 DIAGNOSIS — I82.412 ACUTE DEEP VEIN THROMBOSIS OF LEFT FEMORAL VEIN (H): ICD-10-CM

## 2022-12-02 DIAGNOSIS — I51.3 APICAL MURAL THROMBUS: ICD-10-CM

## 2022-12-02 RX ORDER — WARFARIN SODIUM 5 MG/1
TABLET ORAL
Qty: 135 TABLET | Refills: 1 | OUTPATIENT
Start: 2022-12-02

## 2022-12-02 NOTE — TELEPHONE ENCOUNTER
Encounter Details    Encounter Details  Date Type Department Care Team Description   11/10/2022 Anticoagulation (warfarin) [facility]    [address]    [place] [address]    [phone]   Clinic, Absq Inr   Anticoagulation (Lab)

## 2023-01-01 NOTE — TELEPHONE ENCOUNTER
ANTICOAGULATION     Jurgen Kirkland is overdue for INR check.      spoke with Parrish who declined to schedule a lab appointment at this time. If calling back please schedule as soon as possible.    - he reported transferred care to [facility].    - reviewed chart - looks like he established care with new PCP @ [facility] on 9/10/21 and warfarin being managed by [facility]. Clinic.   - will resolve from [facility].    Darby Galvan RN      ANTICOAGULATION  MANAGEMENT    Jurgen Kirkland is being discharged from the [facility] ([facility]).    Reason for discharge: care has been transferred to [facility]    Anticoagulation episode resolved, [facility] referral closed and INR Standing order discontinued    If patient needs warfarin management in the future, please send a new referral    Darby Galvan RN           Patient declined to answer

## 2023-02-14 ENCOUNTER — TRANSFERRED RECORDS (OUTPATIENT)
Dept: HEALTH INFORMATION MANAGEMENT | Facility: CLINIC | Age: 63
End: 2023-02-14
Payer: COMMERCIAL

## 2023-04-25 ENCOUNTER — TRANSFERRED RECORDS (OUTPATIENT)
Dept: HEALTH INFORMATION MANAGEMENT | Facility: CLINIC | Age: 63
End: 2023-04-25
Payer: COMMERCIAL

## 2023-05-25 ENCOUNTER — TRANSFERRED RECORDS (OUTPATIENT)
Dept: HEALTH INFORMATION MANAGEMENT | Facility: CLINIC | Age: 63
End: 2023-05-25

## 2023-11-27 NOTE — LETTER
Letter by Jasson Abdul MD at      Author: Jasson Abdul MD Service: -- Author Type: --    Filed:  Encounter Date: 7/12/2019 Status: (Other)         Jurgen Kirkland  [address] [place] [address]       July 12, 2019     Dear Mr. Kirkland,    Below are the results from your recent visit:    Resulted Orders   Lipid Profile   Result Value Ref Range    Triglycerides 103 <=149 mg/dL    Cholesterol 193 <=199 mg/dL    LDL Calculated 136 (H) <=129 mg/dL    HDL Cholesterol 36 (L) >=40 mg/dL    Patient Fasting > 8hrs? Yes        Your LDL is OK. Keep taking your medication, atorvastatin 40 mg daily.  We will measure it periodically.  Please call with questions or contact us using [employer].    Sincerely,        Electronically signed by Jasson Abdul MD       
2 seconds or less

## 2024-02-06 ENCOUNTER — TRANSFERRED RECORDS (OUTPATIENT)
Dept: HEALTH INFORMATION MANAGEMENT | Facility: CLINIC | Age: 64
End: 2024-02-06
Payer: COMMERCIAL

## 2025-01-21 ENCOUNTER — TRANSFERRED RECORDS (OUTPATIENT)
Dept: HEALTH INFORMATION MANAGEMENT | Facility: CLINIC | Age: 65
End: 2025-01-21